# Patient Record
Sex: FEMALE | Race: BLACK OR AFRICAN AMERICAN | Employment: OTHER | URBAN - METROPOLITAN AREA
[De-identification: names, ages, dates, MRNs, and addresses within clinical notes are randomized per-mention and may not be internally consistent; named-entity substitution may affect disease eponyms.]

---

## 2017-01-06 ENCOUNTER — HOME HEALTH ADMISSION (OUTPATIENT)
Dept: HOME HEALTH SERVICES | Facility: HOME HEALTH | Age: 67
End: 2017-01-06

## 2017-10-16 ENCOUNTER — HOSPITAL ENCOUNTER (OUTPATIENT)
Dept: CT IMAGING | Age: 67
Discharge: HOME | End: 2017-10-16
Attending: INTERNAL MEDICINE
Payer: MEDICARE

## 2017-10-16 VITALS — WEIGHT: 118 LBS | HEIGHT: 67 IN | BODY MASS INDEX: 18.52 KG/M2

## 2017-10-16 DIAGNOSIS — K57.20 COLONIC DIVERTICULAR ABSCESS: ICD-10-CM

## 2017-10-16 LAB — CREAT BLD-MCNC: 0.9 MG/DL (ref 0.8–1.5)

## 2017-10-16 PROCEDURE — 82565 ASSAY OF CREATININE: CPT

## 2017-10-16 PROCEDURE — 74011000258 HC RX REV CODE- 258: Performed by: INTERNAL MEDICINE

## 2017-10-16 PROCEDURE — 74011636320 HC RX REV CODE- 636/320: Performed by: INTERNAL MEDICINE

## 2017-10-16 PROCEDURE — 74177 CT ABD & PELVIS W/CONTRAST: CPT

## 2017-10-16 RX ORDER — SODIUM CHLORIDE 0.9 % (FLUSH) 0.9 %
10 SYRINGE (ML) INJECTION
Status: COMPLETED | OUTPATIENT
Start: 2017-10-16 | End: 2017-10-16

## 2017-10-16 RX ADMIN — SODIUM CHLORIDE 100 ML: 900 INJECTION, SOLUTION INTRAVENOUS at 14:31

## 2017-10-16 RX ADMIN — DIATRIZOATE MEGLUMINE AND DIATRIZOATE SODIUM 15 ML: 660; 100 LIQUID ORAL; RECTAL at 14:31

## 2017-10-16 RX ADMIN — IOPAMIDOL 100 ML: 755 INJECTION, SOLUTION INTRAVENOUS at 14:31

## 2017-10-16 RX ADMIN — Medication 10 ML: at 14:31

## 2017-10-27 ENCOUNTER — APPOINTMENT (OUTPATIENT)
Dept: CT IMAGING | Age: 67
End: 2017-10-27
Attending: EMERGENCY MEDICINE
Payer: MEDICARE

## 2017-10-27 ENCOUNTER — HOSPITAL ENCOUNTER (EMERGENCY)
Age: 67
Discharge: HOME | End: 2017-10-27
Attending: EMERGENCY MEDICINE
Payer: MEDICARE

## 2017-10-27 VITALS
RESPIRATION RATE: 99 BRPM | TEMPERATURE: 98.3 F | BODY MASS INDEX: 18.36 KG/M2 | HEIGHT: 67 IN | WEIGHT: 117 LBS | HEART RATE: 56 BPM | DIASTOLIC BLOOD PRESSURE: 66 MMHG | OXYGEN SATURATION: 99 % | SYSTOLIC BLOOD PRESSURE: 148 MMHG

## 2017-10-27 DIAGNOSIS — K57.20 COLONIC DIVERTICULAR ABSCESS: Primary | ICD-10-CM

## 2017-10-27 LAB
ALBUMIN SERPL-MCNC: 3 G/DL (ref 3.2–4.6)
ALBUMIN/GLOB SERPL: 0.8 {RATIO} (ref 1.2–3.5)
ALP SERPL-CCNC: 380 U/L (ref 50–136)
ALT SERPL-CCNC: 23 U/L (ref 12–65)
ANION GAP SERPL CALC-SCNC: 9 MMOL/L (ref 7–16)
AST SERPL-CCNC: 45 U/L (ref 15–37)
BASOPHILS # BLD: 0 K/UL (ref 0–0.2)
BASOPHILS NFR BLD: 0 % (ref 0–2)
BILIRUB SERPL-MCNC: 0.4 MG/DL (ref 0.2–1.1)
BUN SERPL-MCNC: 18 MG/DL (ref 8–23)
CALCIUM SERPL-MCNC: 8.4 MG/DL (ref 8.3–10.4)
CHLORIDE SERPL-SCNC: 110 MMOL/L (ref 98–107)
CO2 SERPL-SCNC: 27 MMOL/L (ref 21–32)
CREAT SERPL-MCNC: 0.85 MG/DL (ref 0.6–1)
DIFFERENTIAL METHOD BLD: ABNORMAL
EOSINOPHIL # BLD: 0 K/UL (ref 0–0.8)
EOSINOPHIL NFR BLD: 0 % (ref 0.5–7.8)
ERYTHROCYTE [DISTWIDTH] IN BLOOD BY AUTOMATED COUNT: 17.7 % (ref 11.9–14.6)
GLOBULIN SER CALC-MCNC: 3.7 G/DL (ref 2.3–3.5)
GLUCOSE SERPL-MCNC: 107 MG/DL (ref 65–100)
HCT VFR BLD AUTO: 36.6 % (ref 35.8–46.3)
HGB BLD-MCNC: 11.5 G/DL (ref 11.7–15.4)
IMM GRANULOCYTES # BLD: 0 K/UL (ref 0–0.5)
IMM GRANULOCYTES NFR BLD: 0 % (ref 0–5)
LIPASE SERPL-CCNC: 222 U/L (ref 73–393)
LYMPHOCYTES # BLD: 1.1 K/UL (ref 0.5–4.6)
LYMPHOCYTES NFR BLD: 13 % (ref 13–44)
MCH RBC QN AUTO: 25.7 PG (ref 26.1–32.9)
MCHC RBC AUTO-ENTMCNC: 31.4 G/DL (ref 31.4–35)
MCV RBC AUTO: 81.7 FL (ref 79.6–97.8)
MONOCYTES # BLD: 0.4 K/UL (ref 0.1–1.3)
MONOCYTES NFR BLD: 5 % (ref 4–12)
NEUTS SEG # BLD: 6.9 K/UL (ref 1.7–8.2)
NEUTS SEG NFR BLD: 82 % (ref 43–78)
PLATELET # BLD AUTO: 340 K/UL (ref 150–450)
PMV BLD AUTO: 11.1 FL (ref 10.8–14.1)
POTASSIUM SERPL-SCNC: 3.8 MMOL/L (ref 3.5–5.1)
PROT SERPL-MCNC: 6.7 G/DL (ref 6.3–8.2)
RBC # BLD AUTO: 4.48 M/UL (ref 4.05–5.25)
SODIUM SERPL-SCNC: 146 MMOL/L (ref 136–145)
WBC # BLD AUTO: 8.5 K/UL (ref 4.3–11.1)

## 2017-10-27 PROCEDURE — 74011636320 HC RX REV CODE- 636/320: Performed by: EMERGENCY MEDICINE

## 2017-10-27 PROCEDURE — 74011000258 HC RX REV CODE- 258: Performed by: EMERGENCY MEDICINE

## 2017-10-27 PROCEDURE — 85025 COMPLETE CBC W/AUTO DIFF WBC: CPT | Performed by: EMERGENCY MEDICINE

## 2017-10-27 PROCEDURE — 74011250636 HC RX REV CODE- 250/636: Performed by: EMERGENCY MEDICINE

## 2017-10-27 PROCEDURE — 96361 HYDRATE IV INFUSION ADD-ON: CPT | Performed by: EMERGENCY MEDICINE

## 2017-10-27 PROCEDURE — 99284 EMERGENCY DEPT VISIT MOD MDM: CPT | Performed by: EMERGENCY MEDICINE

## 2017-10-27 PROCEDURE — 83690 ASSAY OF LIPASE: CPT | Performed by: EMERGENCY MEDICINE

## 2017-10-27 PROCEDURE — 96374 THER/PROPH/DIAG INJ IV PUSH: CPT | Performed by: EMERGENCY MEDICINE

## 2017-10-27 PROCEDURE — 74177 CT ABD & PELVIS W/CONTRAST: CPT

## 2017-10-27 PROCEDURE — 96375 TX/PRO/DX INJ NEW DRUG ADDON: CPT | Performed by: EMERGENCY MEDICINE

## 2017-10-27 PROCEDURE — 80053 COMPREHEN METABOLIC PANEL: CPT | Performed by: EMERGENCY MEDICINE

## 2017-10-27 RX ORDER — SODIUM CHLORIDE 9 MG/ML
125 INJECTION, SOLUTION INTRAVENOUS CONTINUOUS
Status: DISCONTINUED | OUTPATIENT
Start: 2017-10-27 | End: 2017-10-27 | Stop reason: HOSPADM

## 2017-10-27 RX ORDER — ONDANSETRON 2 MG/ML
4 INJECTION INTRAMUSCULAR; INTRAVENOUS
Status: COMPLETED | OUTPATIENT
Start: 2017-10-27 | End: 2017-10-27

## 2017-10-27 RX ORDER — MOXIFLOXACIN HYDROCHLORIDE 400 MG/1
400 TABLET ORAL DAILY
Qty: 10 TAB | Refills: 0 | Status: SHIPPED | OUTPATIENT
Start: 2017-10-27 | End: 2017-11-06

## 2017-10-27 RX ORDER — MORPHINE SULFATE 2 MG/ML
2 INJECTION, SOLUTION INTRAMUSCULAR; INTRAVENOUS ONCE
Status: COMPLETED | OUTPATIENT
Start: 2017-10-27 | End: 2017-10-27

## 2017-10-27 RX ORDER — SODIUM CHLORIDE 0.9 % (FLUSH) 0.9 %
10 SYRINGE (ML) INJECTION
Status: COMPLETED | OUTPATIENT
Start: 2017-10-27 | End: 2017-10-27

## 2017-10-27 RX ORDER — ONDANSETRON 4 MG/1
4 TABLET, FILM COATED ORAL
Qty: 9 TAB | Refills: 0 | Status: SHIPPED | OUTPATIENT
Start: 2017-10-27 | End: 2019-04-12

## 2017-10-27 RX ADMIN — IOPAMIDOL 100 ML: 755 INJECTION, SOLUTION INTRAVENOUS at 11:38

## 2017-10-27 RX ADMIN — MORPHINE SULFATE 2 MG: 2 INJECTION, SOLUTION INTRAMUSCULAR; INTRAVENOUS at 10:20

## 2017-10-27 RX ADMIN — ONDANSETRON 4 MG: 2 INJECTION INTRAMUSCULAR; INTRAVENOUS at 10:20

## 2017-10-27 RX ADMIN — Medication 10 ML: at 11:38

## 2017-10-27 RX ADMIN — SODIUM CHLORIDE 100 ML: 900 INJECTION, SOLUTION INTRAVENOUS at 11:38

## 2017-10-27 RX ADMIN — DIATRIZOATE MEGLUMINE AND DIATRIZOATE SODIUM 15 ML: 600; 100 SOLUTION ORAL; RECTAL at 10:19

## 2017-10-27 RX ADMIN — SODIUM CHLORIDE 1000 ML: 900 INJECTION, SOLUTION INTRAVENOUS at 10:20

## 2017-10-27 NOTE — ED NOTES
I have reviewed discharge instructions with the patient. The patient verbalized understanding. Patient left ED via Discharge Method: wheelchair to Home with self    Opportunity for questions and clarification provided. Patient given 2 scripts.

## 2017-10-27 NOTE — ED PROVIDER NOTES
HPI Comments: 51-year-old lady with a history of a diverticular abscess that has been intermittently treated with some antibiotics she presents with concerns about worsening pain. She is worried that the abscess has increased in size. She denies any fevers with that she has had some nausea and mild vomiting. Patient says she's had no blood in her bowels. Overall she rates her pain as a 10 out of 10. Elements of this note were created using speech recognition software. As such, errors of speech recognition may be present. Patient is a 79 y.o. female presenting with abdominal pain. The history is provided by the patient. Abdominal Pain    Associated symptoms include nausea. Pertinent negatives include no fever, no diarrhea, no vomiting, no dysuria, no hematuria, no headaches, no arthralgias, no myalgias and no chest pain.         Past Medical History:   Diagnosis Date    Abnormal gait 7/5/2016    Acute serous otitis media of left ear     Anxiety     Bilateral carpal tunnel syndrome 7/5/2016    Chronic abdominal pain 7/5/2016    Dementia due to Parkinson's disease without behavioral disturbance ([address] 7/5/2016    Depression     Diverticulitis     GERD (gastroesophageal reflux disease)     Heart murmur     Hypercholesterolemia     Hypertension     daily meds, on norvasc prn- took one yesterday for BP of 152/68    Neuropathic pain 7/5/2016    Nontoxic multinodular goiter 7/5/2016    Orthostatic hypotension     Parkinson's disease ([address]     dx 2014- sometimes requires walker or wheelchair when tired    Peripheral neuropathy     Syncope 10/10/2015    Type 2 diabetes mellitus without complication 4/7/6577    \"They thought I was a diabetic\" - no meds- started checking glucose 2 yrs ago; last A1C=? ; has not checked since 5 mos ago (June 2016)    Urinary frequency 7/5/2016    Vaginal bleeding     Vomiting     Weight loss     Weight loss, unintentional     50 lbs over 2 yrs       Past Surgical History:   Procedure Laterality Date    HX APPENDECTOMY  1974    HX CHOLECYSTECTOMY  2001    HX TUBAL LIGATION  1974         Family History:   Problem Relation Age of Onset    Diabetes Mother     Alzheimer Mother     Diabetes Father     Cancer Father      prostate    Stroke Father     Diabetes Sister     Diabetes Brother     Cancer Brother      kidney and prostate    Sickle Cell Anemia Son      2 sons     Breast Cancer Neg Hx        Social History     Social History    Marital status:      Spouse name: N/A    Number of children: N/A    Years of education: N/A     Occupational History    Not on file. Social History Main Topics    Smoking status: Never Smoker    Smokeless tobacco: Never Used    Alcohol use No    Drug use: No    Sexual activity: Not on file     Other Topics Concern    Not on file     Social History Narrative         ALLERGIES: Flagyl [metronidazole]; Gabapentin; Milk containing products; Omnipaque rediflo [iohexol]; and Red dye    Review of Systems   Constitutional: Negative for chills, diaphoresis and fever. HENT: Negative for congestion, rhinorrhea and sore throat. Eyes: Negative for redness and visual disturbance. Respiratory: Negative for cough, chest tightness, shortness of breath and wheezing. Cardiovascular: Negative for chest pain and palpitations. Gastrointestinal: Positive for abdominal pain and nausea. Negative for blood in stool, diarrhea and vomiting. Endocrine: Negative for polydipsia and polyuria. Genitourinary: Negative for dysuria and hematuria. Musculoskeletal: Negative for arthralgias, myalgias and neck stiffness. Skin: Negative for rash. Allergic/Immunologic: Negative for environmental allergies and food allergies. Neurological: Negative for dizziness, weakness and headaches. Hematological: Negative for adenopathy. Does not bruise/bleed easily. Psychiatric/Behavioral: Negative for confusion and sleep disturbance. The patient is not nervous/anxious. Vitals:    10/27/17 0949   BP: 170/81   Pulse: (!) 59   Resp: (!) 99   Temp: 98.3 °F (36.8 °C)   SpO2: 99%   Weight: 53.1 kg (117 lb)   Height: 5' 7\" (1.702 m)            Physical Exam   Constitutional: She is oriented to person, place, and time. She appears well-developed and well-nourished. HENT:   Head: Normocephalic and atraumatic. Eyes: Conjunctivae and EOM are normal. Pupils are equal, round, and reactive to light. Neck: Normal range of motion. Cardiovascular: Normal rate and regular rhythm. Pulmonary/Chest: Effort normal and breath sounds normal. No respiratory distress. She has no wheezes. She has no rales. She exhibits no tenderness. Abdominal: Soft. Bowel sounds are normal. There is tenderness. There is no rebound and no guarding. Mild diffuse abdominal pain with no rebound or guarding   Musculoskeletal: Normal range of motion. She exhibits no edema or tenderness. Lymphadenopathy:     She has no cervical adenopathy. Neurological: She is alert and oriented to person, place, and time. Skin: Skin is warm and dry. Psychiatric: She has a normal mood and affect. Nursing note and vitals reviewed. MDM  Number of Diagnoses or Management Options  Diagnosis management comments: Patient says that she is not allergic to IV or oral contrast material and in fact had a CT scan 10 days ago with both and had no problems. I will get a CT scan of her abdomen with oral and IV contrast to see if the abscess has increased in size. I discussed the case with on call for gastroenterology who advised another round of Augmentin. However, the patient says that the Augmentin turned her red and made her itch I will find a different antibiotic. GI said they would see her in the office on Monday.     ED Course       Procedures

## 2017-10-27 NOTE — DISCHARGE INSTRUCTIONS
Return with any fevers, vomiting, difficulty breathing, worsening symptoms, or additional concerns. You should get a call from the gastroenterology office for an appointment on Monday.

## 2017-10-27 NOTE — ED NOTES
This RN was the secondary RN for this patient. The primary RN was responsible for primary care of this patient.

## 2017-10-27 NOTE — ED TRIAGE NOTES
Patient reports abdominal pain x 4 days. History of diverticulitis earlier this month.  Brought in by EMS

## 2017-11-17 PROBLEM — K59.00 CONSTIPATION: Status: ACTIVE | Noted: 2017-11-17

## 2017-11-17 PROBLEM — K57.80 DIVERTICULAR DISEASE OF INTESTINE WITH PERFORATION AND ABSCESS: Status: ACTIVE | Noted: 2017-11-17

## 2017-12-18 ENCOUNTER — APPOINTMENT (OUTPATIENT)
Dept: CT IMAGING | Age: 67
End: 2017-12-18
Attending: EMERGENCY MEDICINE
Payer: MEDICARE

## 2017-12-18 ENCOUNTER — HOSPITAL ENCOUNTER (EMERGENCY)
Age: 67
Discharge: HOME | End: 2017-12-18
Attending: EMERGENCY MEDICINE
Payer: MEDICARE

## 2017-12-18 VITALS
DIASTOLIC BLOOD PRESSURE: 64 MMHG | HEART RATE: 79 BPM | TEMPERATURE: 98 F | BODY MASS INDEX: 17.42 KG/M2 | WEIGHT: 111 LBS | RESPIRATION RATE: 18 BRPM | SYSTOLIC BLOOD PRESSURE: 150 MMHG | HEIGHT: 67 IN | OXYGEN SATURATION: 98 %

## 2017-12-18 DIAGNOSIS — R10.84 ABDOMINAL PAIN, GENERALIZED: Primary | ICD-10-CM

## 2017-12-18 DIAGNOSIS — R63.4 WEIGHT LOSS: ICD-10-CM

## 2017-12-18 LAB
ALBUMIN SERPL-MCNC: 3.9 G/DL (ref 3.2–4.6)
ALBUMIN/GLOB SERPL: 1.1 {RATIO} (ref 1.2–3.5)
ALP SERPL-CCNC: 97 U/L (ref 50–136)
ALT SERPL-CCNC: 6 U/L (ref 12–65)
ANION GAP SERPL CALC-SCNC: 8 MMOL/L (ref 7–16)
AST SERPL-CCNC: 13 U/L (ref 15–37)
BASOPHILS # BLD: 0 K/UL (ref 0–0.2)
BASOPHILS NFR BLD: 0 % (ref 0–2)
BILIRUB SERPL-MCNC: 0.8 MG/DL (ref 0.2–1.1)
BUN SERPL-MCNC: 11 MG/DL (ref 8–23)
CALCIUM SERPL-MCNC: 8.8 MG/DL (ref 8.3–10.4)
CHLORIDE SERPL-SCNC: 105 MMOL/L (ref 98–107)
CO2 SERPL-SCNC: 26 MMOL/L (ref 21–32)
CREAT SERPL-MCNC: 1.03 MG/DL (ref 0.6–1)
DIFFERENTIAL METHOD BLD: ABNORMAL
EOSINOPHIL # BLD: 0 K/UL (ref 0–0.8)
EOSINOPHIL NFR BLD: 0 % (ref 0.5–7.8)
ERYTHROCYTE [DISTWIDTH] IN BLOOD BY AUTOMATED COUNT: 15.8 % (ref 11.9–14.6)
GLOBULIN SER CALC-MCNC: 3.6 G/DL (ref 2.3–3.5)
GLUCOSE SERPL-MCNC: 95 MG/DL (ref 65–100)
HCT VFR BLD AUTO: 36.2 % (ref 35.8–46.3)
HGB BLD-MCNC: 12.1 G/DL (ref 11.7–15.4)
IMM GRANULOCYTES # BLD: 0 K/UL (ref 0–0.5)
IMM GRANULOCYTES NFR BLD AUTO: 0 % (ref 0–5)
LYMPHOCYTES # BLD: 1.2 K/UL (ref 0.5–4.6)
LYMPHOCYTES NFR BLD: 15 % (ref 13–44)
MCH RBC QN AUTO: 26.9 PG (ref 26.1–32.9)
MCHC RBC AUTO-ENTMCNC: 33.4 G/DL (ref 31.4–35)
MCV RBC AUTO: 80.4 FL (ref 79.6–97.8)
MONOCYTES # BLD: 0.6 K/UL (ref 0.1–1.3)
MONOCYTES NFR BLD: 7 % (ref 4–12)
NEUTS SEG # BLD: 6.5 K/UL (ref 1.7–8.2)
NEUTS SEG NFR BLD: 78 % (ref 43–78)
PLATELET # BLD AUTO: 287 K/UL (ref 150–450)
PMV BLD AUTO: 10.8 FL (ref 10.8–14.1)
POTASSIUM SERPL-SCNC: 3.8 MMOL/L (ref 3.5–5.1)
PROT SERPL-MCNC: 7.5 G/DL (ref 6.3–8.2)
RBC # BLD AUTO: 4.5 M/UL (ref 4.05–5.25)
SODIUM SERPL-SCNC: 139 MMOL/L (ref 136–145)
WBC # BLD AUTO: 8.4 K/UL (ref 4.3–11.1)

## 2017-12-18 PROCEDURE — 81003 URINALYSIS AUTO W/O SCOPE: CPT | Performed by: EMERGENCY MEDICINE

## 2017-12-18 PROCEDURE — 74011000258 HC RX REV CODE- 258: Performed by: EMERGENCY MEDICINE

## 2017-12-18 PROCEDURE — 99284 EMERGENCY DEPT VISIT MOD MDM: CPT | Performed by: EMERGENCY MEDICINE

## 2017-12-18 PROCEDURE — 80053 COMPREHEN METABOLIC PANEL: CPT | Performed by: EMERGENCY MEDICINE

## 2017-12-18 PROCEDURE — 74011636320 HC RX REV CODE- 636/320: Performed by: EMERGENCY MEDICINE

## 2017-12-18 PROCEDURE — 85025 COMPLETE CBC W/AUTO DIFF WBC: CPT | Performed by: EMERGENCY MEDICINE

## 2017-12-18 PROCEDURE — 74011250636 HC RX REV CODE- 250/636: Performed by: EMERGENCY MEDICINE

## 2017-12-18 PROCEDURE — 74177 CT ABD & PELVIS W/CONTRAST: CPT

## 2017-12-18 RX ORDER — SODIUM CHLORIDE 0.9 % (FLUSH) 0.9 %
10 SYRINGE (ML) INJECTION
Status: COMPLETED | OUTPATIENT
Start: 2017-12-18 | End: 2017-12-18

## 2017-12-18 RX ORDER — SODIUM CHLORIDE 9 MG/ML
500 INJECTION, SOLUTION INTRAVENOUS ONCE
Status: COMPLETED | OUTPATIENT
Start: 2017-12-18 | End: 2017-12-18

## 2017-12-18 RX ADMIN — SODIUM CHLORIDE 100 ML: 900 INJECTION, SOLUTION INTRAVENOUS at 15:45

## 2017-12-18 RX ADMIN — Medication 10 ML: at 15:45

## 2017-12-18 RX ADMIN — SODIUM CHLORIDE 500 ML: 900 INJECTION, SOLUTION INTRAVENOUS at 13:52

## 2017-12-18 RX ADMIN — IOPAMIDOL 100 ML: 755 INJECTION, SOLUTION INTRAVENOUS at 15:45

## 2017-12-18 NOTE — DISCHARGE INSTRUCTIONS
Abdominal Pain: Care Instructions  Your Care Instructions    Abdominal pain has many possible causes. Some aren't serious and get better on their own in a few days. Others need more testing and treatment. If your pain continues or gets worse, you need to be rechecked and may need more tests to find out what is wrong. You may need surgery to correct the problem. Don't ignore new symptoms, such as fever, nausea and vomiting, urination problems, pain that gets worse, and dizziness. These may be signs of a more serious problem. Your doctor may have recommended a follow-up visit in the next 8 to 12 hours. If you are not getting better, you may need more tests or treatment. The doctor has checked you carefully, but problems can develop later. If you notice any problems or new symptoms, get medical treatment right away. Follow-up care is a key part of your treatment and safety. Be sure to make and go to all appointments, and call your doctor if you are having problems. It's also a good idea to know your test results and keep a list of the medicines you take. How can you care for yourself at home? · Rest until you feel better. · To prevent dehydration, drink plenty of fluids, enough so that your urine is light yellow or clear like water. Choose water and other caffeine-free clear liquids until you feel better. If you have kidney, heart, or liver disease and have to limit fluids, talk with your doctor before you increase the amount of fluids you drink. · If your stomach is upset, eat mild foods, such as rice, dry toast or crackers, bananas, and applesauce. Try eating several small meals instead of two or three large ones. · Wait until 48 hours after all symptoms have gone away before you have spicy foods, alcohol, and drinks that contain caffeine. · Do not eat foods that are high in fat. · Avoid anti-inflammatory medicines such as aspirin, ibuprofen (Advil, Motrin), and naproxen (Aleve).  These can cause stomach upset. Talk to your doctor if you take daily aspirin for another health problem. When should you call for help? Call 911 anytime you think you may need emergency care. For example, call if:  ? · You passed out (lost consciousness). ? · You pass maroon or very bloody stools. ? · You vomit blood or what looks like coffee grounds. ? · You have new, severe belly pain. ?Call your doctor now or seek immediate medical care if:  ? · Your pain gets worse, especially if it becomes focused in one area of your belly. ? · You have a new or higher fever. ? · Your stools are black and look like tar, or they have streaks of blood. ? · You have unexpected vaginal bleeding. ? · You have symptoms of a urinary tract infection. These may include:  ¨ Pain when you urinate. ¨ Urinating more often than usual.  ¨ Blood in your urine. ? · You are dizzy or lightheaded, or you feel like you may faint. ? Watch closely for changes in your health, and be sure to contact your doctor if:  ? · You are not getting better after 1 day (24 hours). Where can you learn more? Go to http://[employer].brit.info/. Enter G792 in the search box to learn more about \"Abdominal Pain: Care Instructions. \"  Current as of: March 20, 2017  Content Version: 11.4  © 6732-4934 [employer] Care instructions adapted under license by [employer] (which disclaims liability or warranty for this information). If you have questions about a medical condition or this instruction, always ask your healthcare professional. [place] [address] any warranty or liability for your use of this information.   Recent Results (from the past 8 hour(s))   CBC WITH AUTOMATED DIFF    Collection Time: 12/18/17 11:45 AM   Result Value Ref Range    WBC 8.4 4.3 - 11.1 K/uL    RBC 4.50 4.05 - 5.25 M/uL    HGB 12.1 11.7 - 15.4 g/dL    HCT 36.2 35.8 - 46.3 %    MCV 80.4 79.6 - 97.8 FL    MCH 26.9 26.1 - 32.9 PG MCHC 33.4 31.4 - 35.0 g/dL    RDW 15.8 (H) 11.9 - 14.6 %    PLATELET 797 083 - 442 K/uL    MPV 10.8 10.8 - 14.1 FL    DF AUTOMATED      NEUTROPHILS 78 43 - 78 %    LYMPHOCYTES 15 13 - 44 %    MONOCYTES 7 4.0 - 12.0 %    EOSINOPHILS 0 (L) 0.5 - 7.8 %    BASOPHILS 0 0.0 - 2.0 %    IMMATURE GRANULOCYTES 0 0.0 - 5.0 %    ABS. NEUTROPHILS 6.5 1.7 - 8.2 K/UL    ABS. LYMPHOCYTES 1.2 0.5 - 4.6 K/UL    ABS. MONOCYTES 0.6 0.1 - 1.3 K/UL    ABS. EOSINOPHILS 0.0 0.0 - 0.8 K/UL    ABS. BASOPHILS 0.0 0.0 - 0.2 K/UL    ABS. IMM. GRANS. 0.0 0.0 - 0.5 K/UL   METABOLIC PANEL, COMPREHENSIVE    Collection Time: 12/18/17 11:45 AM   Result Value Ref Range    Sodium 139 136 - 145 mmol/L    Potassium 3.8 3.5 - 5.1 mmol/L    Chloride 105 98 - 107 mmol/L    CO2 26 21 - 32 mmol/L    Anion gap 8 7 - 16 mmol/L    Glucose 95 65 - 100 mg/dL    BUN 11 8 - 23 MG/DL    Creatinine 1.03 (H) 0.6 - 1.0 MG/DL    GFR est AA >60 >60 ml/min/1.73m2    GFR est non-AA 57 (L) >60 ml/min/1.73m2    Calcium 8.8 8.3 - 10.4 MG/DL    Bilirubin, total 0.8 0.2 - 1.1 MG/DL    ALT (SGPT) 6 (L) 12 - 65 U/L    AST (SGOT) 13 (L) 15 - 37 U/L    Alk. phosphatase 97 50 - 136 U/L    Protein, total 7.5 6.3 - 8.2 g/dL    Albumin 3.9 3.2 - 4.6 g/dL    Globulin 3.6 (H) 2.3 - 3.5 g/dL    A-G Ratio 1.1 (L) 1.2 - 3.5       Ct Abd Pelv W Cont    Result Date: 12/18/2017  CT ABDOMEN AND PELVIS WITH CONTRAST HISTORY: abd pain, hx of diverticular dz. wt loss, nausea/vomiting, 79 years Female ?c/o abdominal pain- states she was recently dx with diverticulitis. Pt states nausea. Pt also c/o blood in urine and generalized body pain BMI 17.4 COMPARISON: CT abdomen October 27, 2017 TECHNIQUE: Oral contrast was not administered. 100 cc of nonionic intravenous contrast was injected, and axial helical CT images were obtained from above the diaphragm through the pelvis. Coronal reformatted images were obtained at the scanner console and made available for review.   Radiation dose reduction techniques were used for this study:  Our CT scanners use one or all of the following: Automated exposure control, adjustment of the mA and/or kVp according to patient's size, iterative reconstruction. FINDINGS: ABDOMEN: Minimal dependent subsegmental atelectasis bilateral lung bases. Evidence of cholecystectomy. Persistent diffuse hepatic steatosis. Normal-appearing spleen, pancreas, bilateral adrenal glands. Small simple appearing right renal interpolar region cortical cyst unchanged. Subcentimeter left renal cortical hypoenhancing lesions also unchanged, too small to characterize. Left renal upper pole cortical scarring unchanged. Mild calcific atherosclerosis of a normal caliber abdominal aorta. No evidence of significant lymphadenopathy. Normal-appearing small bowel. No evidence of intraperitoneal free air or free fluid. PELVIS: Normal-appearing urinary bladder. Ovoid mass in the anterior uterine myometrium measuring up to 2.5 cm appears unchanged, may represent a fibroid. Normal-appearing bilateral adnexa. Large stool ball in the rectum. Stool is seen throughout the colon. There is no definite evidence of residual diverticular abscess. Persistent sigmoid diverticulosis. No evidence of pelvic free fluid. No evidence of significant inguinal or pelvic sidewall lymphadenopathy. Visualized osseous structures unremarkable. IMPRESSION: 1. No acute pathology identified. No evidence of residual diverticular abscess. 2.  Other chronic findings as above.

## 2017-12-18 NOTE — PROGRESS NOTES
Visit with patient in ER. Char.     Tata Lombardo, staff   C: [phone] /  [email or web address]

## 2017-12-18 NOTE — ED TRIAGE NOTES
Pt c/o abdominal pain- states she was recently dx with diverticulitis. Pt states nausea. Pt also c/o blood in urine and generalized body pain d/t parkinson's. Pt is alert and oriented x 4. Respirations are even and unlabored. Pt appears in no acute distress at this time.

## 2017-12-18 NOTE — ED PROVIDER NOTES
HPI Comments: 44-year-old female presents to the emergency department with lower abdominal pain. Patient states that she was diagnosed with diverticulitis. Being followed by GI. Over the last several days and increased pain. Nausea. No alleviating. They progressively worse    No fever. Some chills. Some nausea.        Past Medical History:   Diagnosis Date    Abnormal gait 7/5/2016    Acute serous otitis media of left ear     Anxiety     Bilateral carpal tunnel syndrome 7/5/2016    Chronic abdominal pain 7/5/2016    Dementia due to Parkinson's disease without behavioral disturbance [address] [address] 7/5/2016    Depression     Diverticulitis     GERD (gastroesophageal reflux disease)     Heart murmur     Hypercholesterolemia     Hypertension     daily meds, on norvasc prn- took one yesterday for BP of 152/68    Neuropathic pain 7/5/2016    Nontoxic multinodular goiter 7/5/2016    Orthostatic hypotension     Parkinson's disease ([facility]     dx 2014- sometimes requires walker or wheelchair when tired    Peripheral neuropathy     Syncope 10/10/2015    Type 2 diabetes mellitus without complication 7/7/6003    \"They thought I was a diabetic\" - no meds- started checking glucose 2 yrs ago; last A1C=? ; has not checked since 5 mos ago (June 2016)    Urinary frequency 7/5/2016    Vaginal bleeding     Vomiting     Weight loss     Weight loss, unintentional     50 lbs over 2 yrs       Past Surgical History:   Procedure Laterality Date    HX APPENDECTOMY  1974    HX CHOLECYSTECTOMY  2001    HX TUBAL LIGATION  1974         Family History:   Problem Relation Age of Onset    Diabetes Mother     Alzheimer Mother     Diabetes Father     Cancer Father      prostate    Stroke Father     Diabetes Sister     Diabetes Brother     Cancer Brother      kidney and prostate    Sickle Cell Anemia Son      2 sons     Breast Cancer Neg Hx        Social History     Social History    Marital status:      Spouse name: N/A    Number of children: N/A    Years of education: N/A     Occupational History    Not on file. Social History Main Topics    Smoking status: Never Smoker    Smokeless tobacco: Never Used    Alcohol use No    Drug use: No    Sexual activity: Not on file     Other Topics Concern    Not on file     Social History Narrative         ALLERGIES: Flagyl [metronidazole]; Gabapentin; Milk containing products; Omnipaque rediflo [iohexol]; and Red dye    Review of Systems   Constitutional: Negative for chills and fever. HENT: Negative. Eyes: Negative. Respiratory: Negative for shortness of breath. Cardiovascular: Negative for chest pain and palpitations. Gastrointestinal: Positive for abdominal pain and nausea. Negative for abdominal distention. Genitourinary: Negative. Musculoskeletal: Negative. Skin: Negative. Neurological: Negative. Psychiatric/Behavioral: Negative. Vitals:    12/18/17 1139 12/18/17 1328   BP: 166/74 136/60   Pulse: 70    Resp: 19    Temp: 97.6 °F (36.4 °C)    SpO2: 98% 99%   Weight: 50.3 kg (111 lb)    Height: 5' 7\" (1.702 m)             Physical Exam   Constitutional: She is oriented to person, place, and time. She appears well-developed and well-nourished. HENT:   Head: Normocephalic and atraumatic. Eyes: Pupils are equal, round, and reactive to light. Cardiovascular: Normal rate and regular rhythm. Pulmonary/Chest: Breath sounds normal. No respiratory distress. She has no wheezes. Abdominal: Soft. She exhibits no distension. There is tenderness. There is guarding. There is no rebound. Musculoskeletal: Normal range of motion. Neurological: She is alert and oriented to person, place, and time. Skin: Skin is warm and dry. Psychiatric: She has a normal mood and affect. Her behavior is normal.   Nursing note and vitals reviewed.        MDM  Number of Diagnoses or Management Options  Diagnosis management comments: 69-year-old female  Abdominal pain. He followed by GI for diverticulitis. Reports weight loss. Feeling poorly. We'll check labs. Obtain CT of abdomen and pelvis. Hydrate. Reassess       Amount and/or Complexity of Data Reviewed  Clinical lab tests: reviewed    atient appears well.   No vomiting    CT and labs reviewed    We'll discharge home to follow up with her primary care physician and GI.  ED Course       Procedures

## 2017-12-18 NOTE — ED NOTES
I have reviewed discharge instructions with the patient. The patient verbalized understanding. Patient left ED via Discharge Method: ambulatory to Home with family. Opportunity for questions and clarification provided. Patient given 0 scripts. To continue your aftercare when you leave the hospital, you may receive an automated call from our care team to check in on how you are doing. This is a free service and part of our promise to provide the best care and service to meet your aftercare needs.  If you have questions, or wish to unsubscribe from this service please call [phone]. Thank you for Choosing our [facility] Emergency Department.

## 2018-01-22 PROBLEM — E11.21 TYPE 2 DIABETES MELLITUS WITH NEPHROPATHY (HCC): Status: ACTIVE | Noted: 2018-01-22

## 2018-01-26 ENCOUNTER — PATIENT OUTREACH (OUTPATIENT)
Dept: CASE MANAGEMENT | Age: 68
End: 2018-01-26

## 2018-01-26 NOTE — PROGRESS NOTES
CM reviewed record. CM attempted to reach patient to discuss concerns and issues with transportation, however no answer    CM will attempt at later date    CM noted the patient has Medicaid - patient may need to be made aware of the Medicaid transportation benefit    CM noted CM SW of this information     This note will not be viewable in [employer].

## 2018-01-29 ENCOUNTER — PATIENT OUTREACH (OUTPATIENT)
Dept: CASE MANAGEMENT | Age: 68
End: 2018-01-29

## 2018-01-29 NOTE — PROGRESS NOTES
KENJI CORREIA contacted patient and was able to reach her - patient stated that she is in process of changing all of her MDs (including PCP) to Crys Mitchell - at current time she has some of her MDs with [facility] and others with [facility] - she will be meeting with her new PCP at [facility] on 1/31 (through [facility]) - patient appeared confused at times during conversation stating that she has a CLTC aide who accompanies her on her appts \"but cannot always be there\" - and she needs to either be taken to appts or have someone accompany her due to her fainting  spells - patient is currently being seen/evaluated by a neurologist for possible Parkinsons disease - this SW also contacted patient's CLTC CM, [employer] (T# [phone]), left message and received a call back from late today - CM stated that patient receives 15 basic care  hours each week and has up to an additional 10 hours available to her for appts - concern on part of [facility] aide is that her car could not handle driving to all of these different appts and her agency was not reimbursing her for mileage/gas - patient does have both [employer] Medicaid and CLTC CM has told her that the aide can accompnay her on the Medicaid van and the hours will be comunicated to the [employer] aide's agency ([employer]) so aide can be avaialble - [facility] CM stated that patient has quite a few MD appts and that this was also presenting its challenges - hopefully, with patient's MDs being centered within one system, appts can be coordinated more completely so that the [employer] aide can accompany patient as she is needed.     This SW has updated RN MASOOD on case status) as she was having difficulty reaching the patient by telephone - also requested that RN CM assist patient with making sure she has the name of the correct PCP that she is to see on 1/31 - transportation with [employer] aide has already been scheduled for this  - KENJI will continue to follow to ensure that patient has resources and how to access them ([employer]).

## 2018-01-30 ENCOUNTER — PATIENT OUTREACH (OUTPATIENT)
Dept: CASE MANAGEMENT | Age: 68
End: 2018-01-30

## 2018-01-30 NOTE — PROGRESS NOTES
MASOOD was referred to patient, who was reported to be established with [facility], however further review  and being able to reach the patient, this is not correct. KENJI CORREIA has been able to speak with her, first, and reported the transportation issue was resolved. The patient has Medicaid and CLTC, so transportation should not be an issue. Patient would benefit from care coordination; and reducing risks of duplication of services, she sees MDs with [facility] and Sarah Murillo and many are duplicated providers. MASOOD RN spoke with her today, she has decided not to attend the appointment with Rockey Baumgarten MD for tomorrow, to establish care, because she is not connected to a hospital system. She now wants to established care with [facility]: SHANNON MARIN, her appointment is 2/7/2018 11:10 AM with Shoshana Carpio NP. Patient was able to inform MASOOD RN of the address of the practice, confirming she was aware of the appointment. However she still has upcoming appointments with [facility] BERMAN for February with Internal Medicine. MASOOD RN referred patient to Keyon Herrera, as this patient will establish care with [facility]: SHANNON MARIN, MASOOD RN unsure why the patient has already been assigned to Dr. Dannie Head office. Note routed to [facility] as well         This note will not be viewable in [address]

## 2018-01-31 ENCOUNTER — PATIENT OUTREACH (OUTPATIENT)
Dept: CASE MANAGEMENT | Age: 68
End: 2018-01-31

## 2018-01-31 NOTE — PROGRESS NOTES
· Patient phoned me earlier and left me a message while I was on the phone with another patient. · I phoned patient back and UTR left  message for patient. · Attempted to outreach to patient's Ritesh Garner [phone] UTR no answer. · Will attempt one more outreach before the EOB today if patient has not returned my call at that time. · 4:45pm attempted outreach again and went to    · Will attempt outreach again tomorrow. This note will not be viewable in [address]

## 2018-02-01 ENCOUNTER — PATIENT OUTREACH (OUTPATIENT)
Dept: CASE MANAGEMENT | Age: 68
End: 2018-02-01

## 2018-02-01 NOTE — PROGRESS NOTES
· Attempted outreach to patient - UTR left VM message. · Outreached to emergency contact, daughter Alaina Villagran and she stated she would get her mother to phone me back  · Awaiting return phone call from patient   · 4pm still no return phone call   · Will attempt outreach #3 to patient tomorrow  This note will not be viewable in [address]

## 2018-02-02 ENCOUNTER — PATIENT OUTREACH (OUTPATIENT)
Dept: CASE MANAGEMENT | Age: 68
End: 2018-02-02

## 2018-02-02 NOTE — PROGRESS NOTES
Attempted outreach #3 and no answer - left VM for patient. Will attempt one more outreach next week. This note will not be viewable in [address]

## 2018-02-06 ENCOUNTER — PATIENT OUTREACH (OUTPATIENT)
Dept: CASE MANAGEMENT | Age: 68
End: 2018-02-06

## 2018-02-06 NOTE — PROGRESS NOTES
Attempted outreach on 2/5/18 and 2/6/18 - UTR left VM message. Case closed at this time - will re-open if patient outreaches to me. This note will not be viewable in [address]

## 2018-02-07 ENCOUNTER — PATIENT OUTREACH (OUTPATIENT)
Dept: CASE MANAGEMENT | Age: 68
End: 2018-02-07

## 2018-02-07 NOTE — PROGRESS NOTES
Case closed after numerous unsuccessful attempts (at least 5 outreach attempts) to outreach to patient. Will reopen case if patient were to phone me and return my phone calls. This note will not be viewable in [address]

## 2018-03-21 ENCOUNTER — HOSPITAL ENCOUNTER (EMERGENCY)
Age: 68
Discharge: HOME | End: 2018-03-21
Payer: MEDICARE

## 2018-03-21 VITALS
OXYGEN SATURATION: 98 % | HEART RATE: 75 BPM | SYSTOLIC BLOOD PRESSURE: 178 MMHG | TEMPERATURE: 97.8 F | RESPIRATION RATE: 16 BRPM | DIASTOLIC BLOOD PRESSURE: 89 MMHG

## 2018-03-21 DIAGNOSIS — R10.84 ABDOMINAL PAIN, GENERALIZED: Primary | ICD-10-CM

## 2018-03-21 LAB
ATRIAL RATE: 113 BPM
BACTERIA URNS QL MICRO: 0 /HPF
CALCULATED P AXIS, ECG09: 51 DEGREES
CALCULATED R AXIS, ECG10: 40 DEGREES
CALCULATED T AXIS, ECG11: 74 DEGREES
CASTS URNS QL MICRO: 0 /LPF
CRP SERPL-MCNC: <0.2 MG/DL (ref 0–0.9)
DIAGNOSIS, 93000: NORMAL
EPI CELLS #/AREA URNS HPF: NORMAL /HPF
P-R INTERVAL, ECG05: 140 MS
Q-T INTERVAL, ECG07: 380 MS
QRS DURATION, ECG06: 74 MS
QTC CALCULATION (BEZET), ECG08: 404 MS
RBC #/AREA URNS HPF: NORMAL /HPF
VENTRICULAR RATE, ECG03: 68 BPM
WBC URNS QL MICRO: NORMAL /HPF

## 2018-03-21 PROCEDURE — 81015 MICROSCOPIC EXAM OF URINE: CPT

## 2018-03-21 PROCEDURE — 74011250637 HC RX REV CODE- 250/637

## 2018-03-21 PROCEDURE — 99284 EMERGENCY DEPT VISIT MOD MDM: CPT

## 2018-03-21 PROCEDURE — 93005 ELECTROCARDIOGRAM TRACING: CPT

## 2018-03-21 PROCEDURE — 86140 C-REACTIVE PROTEIN: CPT

## 2018-03-21 RX ORDER — HYDROGEN PEROXIDE 3 %
20 SOLUTION, NON-ORAL MISCELLANEOUS DAILY
Qty: 20 CAP | Refills: 0 | Status: SHIPPED | OUTPATIENT
Start: 2018-03-21 | End: 2018-04-10

## 2018-03-21 RX ORDER — CLOTRIMAZOLE 10 MG/1
10 LOZENGE ORAL; TOPICAL 4 TIMES DAILY
Status: ON HOLD | COMMUNITY
End: 2018-09-06

## 2018-03-21 RX ORDER — DICYCLOMINE HYDROCHLORIDE 20 MG/1
20 TABLET ORAL EVERY 6 HOURS
Qty: 20 TAB | Refills: 0 | Status: SHIPPED | OUTPATIENT
Start: 2018-03-21 | End: 2018-03-27

## 2018-03-21 RX ORDER — ERGOCALCIFEROL 1.25 MG/1
50000 CAPSULE ORAL
COMMUNITY
End: 2019-04-12

## 2018-03-21 RX ORDER — LORATADINE 10 MG/1
10 TABLET ORAL
COMMUNITY

## 2018-03-21 RX ADMIN — ACETAMINOPHEN 650 MG: 160 SOLUTION ORAL at 01:55

## 2018-03-21 NOTE — ED NOTES
Pt bp 178 89 , pt took her own bp meds as oked per md, pt states she hasn't been her bp medication recently because sometimes her bp will drop, pt also stats she isnt always taking her bp parkinson meds because it makes her nauseated. I have reviewed discharge instructions with the patient. The patient verbalized understanding. Patient left ED via Discharge Method: wheelchair to Home with cab. Opportunity for questions and clarification provided. Patient given 0 scripts. To continue your aftercare when you leave the hospital, you may receive an automated call from our care team to check in on how you are doing. This is a free service and part of our promise to provide the best care and service to meet your aftercare needs.  If you have questions, or wish to unsubscribe from this service please call [phone]. Thank you for Choosing our [facility] Emergency Department.

## 2018-03-21 NOTE — DISCHARGE INSTRUCTIONS
Abdominal Pain: Care Instructions  Your Care Instructions    Abdominal pain has many possible causes. Some aren't serious and get better on their own in a few days. Others need more testing and treatment. If your pain continues or gets worse, you need to be rechecked and may need more tests to find out what is wrong. You may need surgery to correct the problem. Don't ignore new symptoms, such as fever, nausea and vomiting, urination problems, pain that gets worse, and dizziness. These may be signs of a more serious problem. Your doctor may have recommended a follow-up visit in the next 8 to 12 hours. If you are not getting better, you may need more tests or treatment. The doctor has checked you carefully, but problems can develop later. If you notice any problems or new symptoms, get medical treatment right away. Follow-up care is a key part of your treatment and safety. Be sure to make and go to all appointments, and call your doctor if you are having problems. It's also a good idea to know your test results and keep a list of the medicines you take. How can you care for yourself at home? · Rest until you feel better. · To prevent dehydration, drink plenty of fluids, enough so that your urine is light yellow or clear like water. Choose water and other caffeine-free clear liquids until you feel better. If you have kidney, heart, or liver disease and have to limit fluids, talk with your doctor before you increase the amount of fluids you drink. · If your stomach is upset, eat mild foods, such as rice, dry toast or crackers, bananas, and applesauce. Try eating several small meals instead of two or three large ones. · Wait until 48 hours after all symptoms have gone away before you have spicy foods, alcohol, and drinks that contain caffeine. · Do not eat foods that are high in fat. · Avoid anti-inflammatory medicines such as aspirin, ibuprofen (Advil, Motrin), and naproxen (Aleve).  These can cause stomach upset. Talk to your doctor if you take daily aspirin for another health problem. When should you call for help? Call 911 anytime you think you may need emergency care. For example, call if:  ? · You passed out (lost consciousness). ? · You pass maroon or very bloody stools. ? · You vomit blood or what looks like coffee grounds. ? · You have new, severe belly pain. ?Call your doctor now or seek immediate medical care if:  ? · Your pain gets worse, especially if it becomes focused in one area of your belly. ? · You have a new or higher fever. ? · Your stools are black and look like tar, or they have streaks of blood. ? · You have unexpected vaginal bleeding. ? · You have symptoms of a urinary tract infection. These may include:  ¨ Pain when you urinate. ¨ Urinating more often than usual.  ¨ Blood in your urine. ? · You are dizzy or lightheaded, or you feel like you may faint. ? Watch closely for changes in your health, and be sure to contact your doctor if:  ? · You are not getting better after 1 day (24 hours). Where can you learn more? Go to http://livan-brit.info/. Enter I248 in the search box to learn more about \"Abdominal Pain: Care Instructions. \"  Current as of: March 20, 2017  Content Version: 11.4  © 0335-2358 "[employer]. Care instructions adapted under license by [employer] (which disclaims liability or warranty for this information). If you have questions about a medical condition or this instruction, always ask your healthcare professional. [place] [address] any warranty or liability for your use of this information.

## 2018-03-21 NOTE — ED PROVIDER NOTES
HPI Comments: 15-year-old female complaining of abdominal pain she points to the suprapubic area and epigastric area where her pain is located. She states has been going on for several months she was in the hospital in December for the same pain. She was told she had diverticulitis. She followed up with her primary care physician who suggested that she get a CAT scan. Patient is a 79 y.o. female presenting with abdominal pain. Abdominal Pain    This is a new problem. The current episode started more than 1 week ago. The problem occurs constantly. The problem has not changed since onset. The pain is located in the suprapubic region. The quality of the pain is dull. The pain is at a severity of 5/10. Nothing worsens the pain. The pain is relieved by nothing. The patient's surgical history non-contributory.        Past Medical History:   Diagnosis Date    Abnormal gait 7/5/2016    Acute serous otitis media of left ear     Anxiety     Bilateral carpal tunnel syndrome 7/5/2016    Chronic abdominal pain 7/5/2016    Dementia due to Parkinson's disease without behavioral disturbance ([address] 7/5/2016    Depression     Diverticulitis     Diverticulitis     Gastritis     GERD (gastroesophageal reflux disease)     Heart murmur     Hypercholesterolemia     Hypertension     daily meds, on norvasc prn- took one yesterday for BP of 152/68    Neuropathic pain 7/5/2016    Nontoxic multinodular goiter 7/5/2016    Orthostatic hypotension     Orthostatic hypotension     Parkinson's disease ([address]     dx 2014- sometimes requires walker or wheelchair when tired    Parkinson's disease ([address]     Peripheral neuropathy     Syncope 10/10/2015    Type 2 diabetes mellitus without complication 0/7/0270    \"They thought I was a diabetic\" - no meds- started checking glucose 2 yrs ago; last A1C=? ; has not checked since 5 mos ago (June 2016)    Urinary frequency 7/5/2016    Vaginal bleeding     Vomiting     Weight loss  Weight loss, unintentional     50 lbs over 2 yrs       Past Surgical History:   Procedure Laterality Date    HX APPENDECTOMY  1974    HX CHOLECYSTECTOMY  2001    HX TUBAL LIGATION  1974         Family History:   Problem Relation Age of Onset    Diabetes Mother     Alzheimer Mother     Diabetes Father     Cancer Father      prostate    Stroke Father     Diabetes Sister     Diabetes Brother     Cancer Brother      kidney and prostate    Sickle Cell Anemia Son      2 sons     Breast Cancer Neg Hx        Social History     Social History    Marital status:      Spouse name: N/A    Number of children: N/A    Years of education: N/A     Occupational History    Not on file. Social History Main Topics    Smoking status: Never Smoker    Smokeless tobacco: Never Used    Alcohol use No    Drug use: No    Sexual activity: Not on file     Other Topics Concern    Not on file     Social History Narrative         ALLERGIES: Flagyl [metronidazole]; Aspirin; Contrast agent [iodine]; Gabapentin; Milk containing products; Omnipaque rediflo [iohexol]; and Red dye    Review of Systems   Constitutional: Negative. Negative for activity change. HENT: Negative. Eyes: Negative. Respiratory: Negative. Cardiovascular: Negative. Gastrointestinal: Positive for abdominal pain. Genitourinary: Negative. Musculoskeletal: Negative. Skin: Negative. Neurological: Negative. Psychiatric/Behavioral: Negative. All other systems reviewed and are negative. Vitals:    03/20/18 2357   BP: 179/75   Pulse: 75   Resp: 18   Temp: 97.8 °F (36.6 °C)   SpO2: 98%            Physical Exam   Constitutional: She is oriented to person, place, and time. She appears well-developed and well-nourished. No distress. HENT:   Head: Normocephalic and atraumatic.    Right Ear: External ear normal.   Left Ear: External ear normal.   Nose: Nose normal.   Mouth/Throat: Oropharynx is clear and moist. No oropharyngeal exudate. Eyes: Conjunctivae and EOM are normal. Pupils are equal, round, and reactive to light. Right eye exhibits no discharge. Left eye exhibits no discharge. No scleral icterus. Neck: Normal range of motion. Neck supple. No JVD present. No tracheal deviation present. Cardiovascular: Normal rate, regular rhythm and intact distal pulses. Pulmonary/Chest: Effort normal and breath sounds normal. No stridor. No respiratory distress. She has no wheezes. She exhibits no tenderness. Abdominal: Soft. Bowel sounds are normal. She exhibits no distension and no mass. There is no tenderness. Musculoskeletal: Normal range of motion. She exhibits no edema or tenderness. Neurological: She is alert and oriented to person, place, and time. No cranial nerve deficit. Skin: Skin is warm and dry. No rash noted. She is not diaphoretic. No erythema. No pallor. Psychiatric: She has a normal mood and affect. Her behavior is normal. Thought content normal.   Nursing note and vitals reviewed.        MDM  Number of Diagnoses or Management Options  Abdominal pain, generalized:      Amount and/or Complexity of Data Reviewed  Clinical lab tests: ordered and reviewed  Tests in the radiology section of CPT®: ordered and reviewed  Tests in the medicine section of CPT®: ordered and reviewed    Risk of Complications, Morbidity, and/or Mortality  Presenting problems: moderate  Diagnostic procedures: moderate  Management options: moderate          ED Course       Procedures

## 2018-03-21 NOTE — ED TRIAGE NOTES
Pt in via EMS from home with complaints of neck, back, epigastric, leg, and foot pain with numbness and tingling in her feet from neuropathy, as well as nausea X1 month. Complains of chest tightness and burning. Pt has not been taking her Gabapentin because she states it makes her nauseated. When asked what her main complaint is, she states \"everything. \" Also states she can't take her parkinson's medication or Gabapentin because they give her nausea.

## 2018-05-24 ENCOUNTER — HOSPITAL ENCOUNTER (EMERGENCY)
Age: 68
Discharge: HOME | End: 2018-05-24
Attending: EMERGENCY MEDICINE
Payer: MEDICARE

## 2018-05-24 VITALS
DIASTOLIC BLOOD PRESSURE: 85 MMHG | RESPIRATION RATE: 16 BRPM | HEIGHT: 67 IN | HEART RATE: 80 BPM | BODY MASS INDEX: 17.42 KG/M2 | OXYGEN SATURATION: 100 % | WEIGHT: 111 LBS | SYSTOLIC BLOOD PRESSURE: 191 MMHG | TEMPERATURE: 98.2 F

## 2018-05-24 DIAGNOSIS — K29.90 GASTRITIS AND DUODENITIS: Primary | ICD-10-CM

## 2018-05-24 LAB
ALBUMIN SERPL-MCNC: 3.4 G/DL (ref 3.2–4.6)
ALBUMIN/GLOB SERPL: 1 {RATIO} (ref 1.2–3.5)
ALP SERPL-CCNC: 65 U/L (ref 50–136)
ALT SERPL-CCNC: <6 U/L (ref 12–65)
ANION GAP SERPL CALC-SCNC: 8 MMOL/L (ref 7–16)
AST SERPL-CCNC: 13 U/L (ref 15–37)
BACTERIA URNS QL MICRO: 0 /HPF
BASOPHILS # BLD: 0 K/UL (ref 0–0.2)
BASOPHILS NFR BLD: 0 % (ref 0–2)
BILIRUB SERPL-MCNC: 0.5 MG/DL (ref 0.2–1.1)
BUN SERPL-MCNC: 10 MG/DL (ref 8–23)
CALCIUM SERPL-MCNC: 8.8 MG/DL (ref 8.3–10.4)
CASTS URNS QL MICRO: 0 /LPF
CHLORIDE SERPL-SCNC: 108 MMOL/L (ref 98–107)
CO2 SERPL-SCNC: 27 MMOL/L (ref 21–32)
CREAT SERPL-MCNC: 1.26 MG/DL (ref 0.6–1)
CRYSTALS URNS QL MICRO: 0 /LPF
DIFFERENTIAL METHOD BLD: ABNORMAL
EOSINOPHIL # BLD: 0 K/UL (ref 0–0.8)
EOSINOPHIL NFR BLD: 0 % (ref 0.5–7.8)
EPI CELLS #/AREA URNS HPF: NORMAL /HPF
ERYTHROCYTE [DISTWIDTH] IN BLOOD BY AUTOMATED COUNT: 14.5 % (ref 11.9–14.6)
GLOBULIN SER CALC-MCNC: 3.5 G/DL (ref 2.3–3.5)
GLUCOSE SERPL-MCNC: 87 MG/DL (ref 65–100)
HCT VFR BLD AUTO: 34.9 % (ref 35.8–46.3)
HGB BLD-MCNC: 11.6 G/DL (ref 11.7–15.4)
IMM GRANULOCYTES # BLD: 0.1 K/UL (ref 0–0.5)
IMM GRANULOCYTES NFR BLD AUTO: 1 % (ref 0–5)
LACTATE BLD-SCNC: 0.4 MMOL/L (ref 0.5–1.9)
LIPASE SERPL-CCNC: 197 U/L (ref 73–393)
LYMPHOCYTES # BLD: 1.8 K/UL (ref 0.5–4.6)
LYMPHOCYTES NFR BLD: 20 % (ref 13–44)
MCH RBC QN AUTO: 26.9 PG (ref 26.1–32.9)
MCHC RBC AUTO-ENTMCNC: 33.2 G/DL (ref 31.4–35)
MCV RBC AUTO: 80.8 FL (ref 79.6–97.8)
MONOCYTES # BLD: 0.7 K/UL (ref 0.1–1.3)
MONOCYTES NFR BLD: 8 % (ref 4–12)
MUCOUS THREADS URNS QL MICRO: 0 /LPF
NEUTS SEG # BLD: 6.5 K/UL (ref 1.7–8.2)
NEUTS SEG NFR BLD: 71 % (ref 43–78)
PLATELET # BLD AUTO: 253 K/UL (ref 150–450)
PMV BLD AUTO: 10.1 FL (ref 10.8–14.1)
POTASSIUM SERPL-SCNC: 4 MMOL/L (ref 3.5–5.1)
PROT SERPL-MCNC: 6.9 G/DL (ref 6.3–8.2)
RBC # BLD AUTO: 4.32 M/UL (ref 4.05–5.25)
RBC #/AREA URNS HPF: NORMAL /HPF
SODIUM SERPL-SCNC: 143 MMOL/L (ref 136–145)
WBC # BLD AUTO: 9 K/UL (ref 4.3–11.1)
WBC URNS QL MICRO: NORMAL /HPF

## 2018-05-24 PROCEDURE — 96361 HYDRATE IV INFUSION ADD-ON: CPT | Performed by: EMERGENCY MEDICINE

## 2018-05-24 PROCEDURE — 99285 EMERGENCY DEPT VISIT HI MDM: CPT | Performed by: EMERGENCY MEDICINE

## 2018-05-24 PROCEDURE — 96374 THER/PROPH/DIAG INJ IV PUSH: CPT | Performed by: EMERGENCY MEDICINE

## 2018-05-24 PROCEDURE — 83605 ASSAY OF LACTIC ACID: CPT

## 2018-05-24 PROCEDURE — 81003 URINALYSIS AUTO W/O SCOPE: CPT | Performed by: EMERGENCY MEDICINE

## 2018-05-24 PROCEDURE — 81015 MICROSCOPIC EXAM OF URINE: CPT | Performed by: EMERGENCY MEDICINE

## 2018-05-24 PROCEDURE — 74011000250 HC RX REV CODE- 250: Performed by: EMERGENCY MEDICINE

## 2018-05-24 PROCEDURE — 80053 COMPREHEN METABOLIC PANEL: CPT | Performed by: EMERGENCY MEDICINE

## 2018-05-24 PROCEDURE — 85025 COMPLETE CBC W/AUTO DIFF WBC: CPT | Performed by: EMERGENCY MEDICINE

## 2018-05-24 PROCEDURE — 96375 TX/PRO/DX INJ NEW DRUG ADDON: CPT | Performed by: EMERGENCY MEDICINE

## 2018-05-24 PROCEDURE — 74011250636 HC RX REV CODE- 250/636: Performed by: EMERGENCY MEDICINE

## 2018-05-24 PROCEDURE — 74011250637 HC RX REV CODE- 250/637: Performed by: EMERGENCY MEDICINE

## 2018-05-24 PROCEDURE — 83690 ASSAY OF LIPASE: CPT | Performed by: EMERGENCY MEDICINE

## 2018-05-24 RX ORDER — ONDANSETRON 2 MG/ML
4 INJECTION INTRAMUSCULAR; INTRAVENOUS
Status: COMPLETED | OUTPATIENT
Start: 2018-05-24 | End: 2018-05-24

## 2018-05-24 RX ORDER — KETOROLAC TROMETHAMINE 30 MG/ML
15 INJECTION, SOLUTION INTRAMUSCULAR; INTRAVENOUS
Status: COMPLETED | OUTPATIENT
Start: 2018-05-24 | End: 2018-05-24

## 2018-05-24 RX ORDER — LIDOCAINE HYDROCHLORIDE 20 MG/ML
15 SOLUTION OROPHARYNGEAL
Status: COMPLETED | OUTPATIENT
Start: 2018-05-24 | End: 2018-05-24

## 2018-05-24 RX ADMIN — ONDANSETRON 4 MG: 2 INJECTION INTRAMUSCULAR; INTRAVENOUS at 22:06

## 2018-05-24 RX ADMIN — Medication 30 ML: at 22:06

## 2018-05-24 RX ADMIN — KETOROLAC TROMETHAMINE 15 MG: 30 INJECTION, SOLUTION INTRAMUSCULAR at 22:06

## 2018-05-24 RX ADMIN — LIDOCAINE HYDROCHLORIDE 15 ML: 20 SOLUTION ORAL; TOPICAL at 22:06

## 2018-05-24 RX ADMIN — SODIUM CHLORIDE 1000 ML: 900 INJECTION, SOLUTION INTRAVENOUS at 22:04

## 2018-05-25 NOTE — ED NOTES
Pt took two sips of her GI cocktail with lidocaine and refused the rest, sighting she \"couldn't do it. \"

## 2018-05-25 NOTE — DISCHARGE INSTRUCTIONS
Gastritis: Care Instructions  Your Care Instructions    Gastritis is a sore and upset stomach. It happens when something irritates the stomach lining. Many things can cause it. These include an infection such as the flu or something you ate or drank. Medicines or a sore on the lining of the stomach (ulcer) also can cause it. Your belly may bloat and ache. You may belch, vomit, and feel sick to your stomach. You should be able to relieve the problem by taking medicine. And it may help to change your diet. If gastritis lasts, your doctor may prescribe medicine. Follow-up care is a key part of your treatment and safety. Be sure to make and go to all appointments, and call your doctor if you are having problems. It's also a good idea to know your test results and keep a list of the medicines you take. How can you care for yourself at home? · If your doctor prescribed antibiotics, take them as directed. Do not stop taking them just because you feel better. You need to take the full course of antibiotics. · Be safe with medicines. If your doctor prescribed medicine to decrease stomach acid, take it as directed. Call your doctor if you think you are having a problem with your medicine. · Do not take any other medicine, including over-the-counter pain relievers, without talking to your doctor first.  · If your doctor recommends over-the-counter medicine to reduce stomach acid, such as Pepcid AC, Prilosec, Tagamet HB, or Zantac 75, follow the directions on the label. · Drink plenty of fluids (enough so that your urine is light yellow or clear like water) to prevent dehydration. Choose water and other caffeine-free clear liquids. If you have kidney, heart, or liver disease and have to limit fluids, talk with your doctor before you increase the amount of fluids you drink. · Limit how much alcohol you drink. · Avoid coffee, tea, cola drinks, chocolate, and other foods with caffeine.  They increase stomach acid.  When should you call for help? Call 911 anytime you think you may need emergency care. For example, call if:  ? · You vomit blood or what looks like coffee grounds. ? · You pass maroon or very bloody stools. ?Call your doctor now or seek immediate medical care if:  ? · You start breathing fast and have not produced urine in the last 8 hours. ? · You cannot keep fluids down. ? Watch closely for changes in your health, and be sure to contact your doctor if:  ? · You do not get better as expected. Where can you learn more? Go to http://livan-brit.info/. Enter [id number] in the search box to learn more about \"Gastritis: Care Instructions. \"  Current as of: May 12, 2017  Content Version: 11.4  © 8873-2526 [employer]. Care instructions adapted under license by [employer] (which disclaims liability or warranty for this information). If you have questions about a medical condition or this instruction, always ask your healthcare professional. [address] any warranty or liability for your use of this information.

## 2018-05-25 NOTE — ED TRIAGE NOTES
Pt arrived via [facility] M [facility] with c/o abdominal pain x 3 months. \"I've been hurting for 3 to 4 months, can't eat nothing that isn't soft, and it feels like someone stabbing me. \" States N&V, and constipation.

## 2018-05-25 NOTE — ED NOTES
I have reviewed discharge instructions with the patient. The patient verbalized understanding. Patient left ED via Discharge Method: stretcher to Home with self via [facility]. Opportunity for questions and clarification provided. Patient given 0 scripts. To continue your aftercare when you leave the hospital, you may receive an automated call from our care team to check in on how you are doing. This is a free service and part of our promise to provide the best care and service to meet your aftercare needs.  If you have questions, or wish to unsubscribe from this service please call [phone]. Thank you for Choosing our [facility] Emergency Department.

## 2018-05-25 NOTE — ED PROVIDER NOTES
HPI Comments: Patient with history of cholecystectomy and appendectomy. End of last year was diagnosed with diverticulitis with abscess. Has been seen in multiple ERs since then along with follow-up with surgery and GI. Last CAT scan per surgery note shows complete resolution of diverticulitis and abscess. Patient was a no show to her gastric emptying study  And had her April colonoscopy canceled. She is not returning caused to patient outreach starting in February. Returns complaining of continued abdominal pain and nausea and vomiting. Does have history of acid reflux also. Hurts in the epigastric area. Denies any lower abdominal pain. Patient is a 76 y.o. female presenting with epigastric pain. The history is provided by the patient. No  was used. Epigastric Pain    This is a new problem. Episode onset: 5-6\ months. The problem occurs daily. The problem has not changed since onset. The pain is associated with an unknown factor. The pain is located in the epigastric region, LUQ, RUQ and chest. The quality of the pain is burning. The pain is moderate. Associated symptoms include nausea, vomiting and constipation. Pertinent negatives include no fever, no diarrhea, no melena, no dysuria, no hematuria, no headaches, no chest pain and no back pain. Nothing worsens the pain. The pain is relieved by nothing. Past workup includes CT scan. Her past medical history is significant for diverticulitis. The patient's surgical history includes cholecystectomy.        Past Medical History:   Diagnosis Date    Abnormal gait 7/5/2016    Acute serous otitis media of left ear     Anxiety     Bilateral carpal tunnel syndrome 7/5/2016    Chronic abdominal pain 7/5/2016    Dementia due to Parkinson's disease without behavioral disturbance ([facility] [address] 7/5/2016    Depression     Diverticulitis     Diverticulitis     Gastritis     GERD (gastroesophageal reflux disease)     Heart murmur     Hypercholesterolemia     Hypertension     daily meds, on norvasc prn- took one yesterday for BP of 152/68    Neuropathic pain 7/5/2016    Nontoxic multinodular goiter 7/5/2016    Orthostatic hypotension     Orthostatic hypotension     Parkinson's disease ([address]     dx 2014- sometimes requires walker or wheelchair when tired    Parkinson's disease ([address] [address]     Peripheral neuropathy     Syncope 10/10/2015    Type 2 diabetes mellitus without complication 1/8/7202    \"They thought I was a diabetic\" - no meds- started checking glucose 2 yrs ago; last A1C=? ; has not checked since 5 mos ago (June 2016)    Urinary frequency 7/5/2016    Vaginal bleeding     Vomiting     Weight loss     Weight loss, unintentional     50 lbs over 2 yrs       Past Surgical History:   Procedure Laterality Date    HX APPENDECTOMY  1974    HX CHOLECYSTECTOMY  2001    HX TUBAL LIGATION  1974         Family History:   Problem Relation Age of Onset    Diabetes Mother     Alzheimer Mother     Diabetes Father     Cancer Father      prostate    Stroke Father     Diabetes Sister     Diabetes Brother     Cancer Brother      kidney and prostate    Sickle Cell Anemia Son      2 sons     Breast Cancer Neg Hx        Social History     Social History    Marital status:      Spouse name: N/A    Number of children: N/A    Years of education: N/A     Occupational History    Not on file. Social History Main Topics    Smoking status: Never Smoker    Smokeless tobacco: Never Used    Alcohol use No    Drug use: No    Sexual activity: Not on file     Other Topics Concern    Not on file     Social History Narrative         ALLERGIES: Flagyl [metronidazole]; Aspirin; Contrast agent [iodine]; Gabapentin; Milk containing products; Omnipaque rediflo [iohexol]; and Red dye    Review of Systems   Constitutional: Negative for chills and fever. HENT: Negative for rhinorrhea and sore throat. Eyes: Negative for pain and redness. Respiratory: Negative for chest tightness, shortness of breath and wheezing. Cardiovascular: Negative for chest pain and leg swelling. Gastrointestinal: Positive for abdominal pain, constipation, nausea and vomiting. Negative for diarrhea and melena. Genitourinary: Negative for dysuria and hematuria. Musculoskeletal: Negative for back pain, gait problem, neck pain and neck stiffness. Skin: Negative for color change and rash. Neurological: Negative for weakness, numbness and headaches. Vitals:    05/24/18 2127   BP: 146/79   Pulse: 73   Resp: 18   Temp: 98.3 °F (36.8 °C)   SpO2: 99%   Weight: 50.3 kg (111 lb)   Height: 5' 7\" (1.702 m)            Physical Exam   Constitutional: She is oriented to person, place, and time. She appears well-developed and well-nourished. HENT:   Head: Normocephalic and atraumatic. Neck: Normal range of motion. Neck supple. Cardiovascular: Normal rate and regular rhythm. No murmur heard. Pulmonary/Chest: Effort normal and breath sounds normal. She has no wheezes. Abdominal: Soft. Bowel sounds are normal. There is tenderness (epigastric and upper abd. ). There is no rebound and no guarding. Musculoskeletal: Normal range of motion. She exhibits no edema. Neurological: She is alert and oriented to person, place, and time. Skin: Skin is warm and dry. Nursing note and vitals reviewed. MDM  Number of Diagnoses or Management Options  Diagnosis management comments: Pt with no acute infection on blood work. Likely gastritis. Will discharge with GI follow up.         Amount and/or Complexity of Data Reviewed  Clinical lab tests: ordered and reviewed  Tests in the radiology section of CPT®: reviewed  Tests in the medicine section of CPT®: ordered and reviewed    Patient Progress  Patient progress: stable        ED Course       Procedures    Results Include:    Recent Results (from the past 24 hour(s))   CBC WITH AUTOMATED DIFF    Collection Time: 05/24/18 9:47 PM   Result Value Ref Range    WBC 9.0 4.3 - 11.1 K/uL    RBC 4.32 4.05 - 5.25 M/uL    HGB 11.6 (L) 11.7 - 15.4 g/dL    HCT 34.9 (L) 35.8 - 46.3 %    MCV 80.8 79.6 - 97.8 FL    MCH 26.9 26.1 - 32.9 PG    MCHC 33.2 31.4 - 35.0 g/dL    RDW 14.5 11.9 - 14.6 %    PLATELET 239 223 - 566 K/uL    MPV 10.1 (L) 10.8 - 14.1 FL    DF AUTOMATED      NEUTROPHILS 71 43 - 78 %    LYMPHOCYTES 20 13 - 44 %    MONOCYTES 8 4.0 - 12.0 %    EOSINOPHILS 0 (L) 0.5 - 7.8 %    BASOPHILS 0 0.0 - 2.0 %    IMMATURE GRANULOCYTES 1 0.0 - 5.0 %    ABS. NEUTROPHILS 6.5 1.7 - 8.2 K/UL    ABS. LYMPHOCYTES 1.8 0.5 - 4.6 K/UL    ABS. MONOCYTES 0.7 0.1 - 1.3 K/UL    ABS. EOSINOPHILS 0.0 0.0 - 0.8 K/UL    ABS. BASOPHILS 0.0 0.0 - 0.2 K/UL    ABS. IMM. GRANS. 0.1 0.0 - 0.5 K/UL   METABOLIC PANEL, COMPREHENSIVE    Collection Time: 05/24/18  9:47 PM   Result Value Ref Range    Sodium 143 136 - 145 mmol/L    Potassium 4.0 3.5 - 5.1 mmol/L    Chloride 108 (H) 98 - 107 mmol/L    CO2 27 21 - 32 mmol/L    Anion gap 8 7 - 16 mmol/L    Glucose 87 65 - 100 mg/dL    BUN 10 8 - 23 MG/DL    Creatinine 1.26 (H) 0.6 - 1.0 MG/DL    GFR est AA 54 (L) >60 ml/min/1.73m2    GFR est non-AA 45 (L) >60 ml/min/1.73m2    Calcium 8.8 8.3 - 10.4 MG/DL    Bilirubin, total 0.5 0.2 - 1.1 MG/DL    ALT (SGPT) <6 (L) 12 - 65 U/L    AST (SGOT) 13 (L) 15 - 37 U/L    Alk.  phosphatase 65 50 - 136 U/L    Protein, total 6.9 6.3 - 8.2 g/dL    Albumin 3.4 3.2 - 4.6 g/dL    Globulin 3.5 2.3 - 3.5 g/dL    A-G Ratio 1.0 (L) 1.2 - 3.5     LIPASE    Collection Time: 05/24/18  9:47 PM   Result Value Ref Range    Lipase 197 73 - 393 U/L   POC LACTIC ACID    Collection Time: 05/24/18  9:55 PM   Result Value Ref Range    Lactic Acid (POC) 0.4 (LL) 0.5 - 1.9 mmol/L   URINE MICROSCOPIC    Collection Time: 05/24/18 10:19 PM   Result Value Ref Range    WBC 3-5 0 /hpf    RBC 0-3 0 /hpf    Epithelial cells 0-3 0 /hpf    Bacteria 0 0 /hpf    Casts 0 0 /lpf    Crystals, urine 0 0 /LPF    Mucus 0 0 /lpf

## 2018-05-31 ENCOUNTER — HOSPITAL ENCOUNTER (OUTPATIENT)
Dept: CT IMAGING | Age: 68
Discharge: HOME | End: 2018-05-31
Attending: INTERNAL MEDICINE
Payer: MEDICARE

## 2018-05-31 DIAGNOSIS — R10.9 UNSPECIFIED ABDOMINAL PAIN: ICD-10-CM

## 2018-05-31 PROCEDURE — 74011636320 HC RX REV CODE- 636/320: Performed by: INTERNAL MEDICINE

## 2018-05-31 PROCEDURE — 74176 CT ABD & PELVIS W/O CONTRAST: CPT

## 2018-05-31 RX ORDER — SODIUM CHLORIDE 0.9 % (FLUSH) 0.9 %
10 SYRINGE (ML) INJECTION
Status: ACTIVE | OUTPATIENT
Start: 2018-05-31 | End: 2018-05-31

## 2018-05-31 RX ADMIN — DIATRIZOATE MEGLUMINE AND DIATRIZOATE SODIUM 15 ML: 660; 100 LIQUID ORAL; RECTAL at 11:55

## 2018-06-02 ENCOUNTER — HOME HEALTH ADMISSION (OUTPATIENT)
Dept: HOME HEALTH SERVICES | Facility: HOME HEALTH | Age: 68
End: 2018-06-02
Payer: MEDICARE

## 2018-06-02 ENCOUNTER — HOSPITAL ENCOUNTER (EMERGENCY)
Age: 68
Discharge: HOME | End: 2018-06-02
Attending: EMERGENCY MEDICINE
Payer: MEDICARE

## 2018-06-02 VITALS
TEMPERATURE: 98.7 F | RESPIRATION RATE: 18 BRPM | OXYGEN SATURATION: 98 % | SYSTOLIC BLOOD PRESSURE: 129 MMHG | BODY MASS INDEX: 18.83 KG/M2 | HEIGHT: 67 IN | DIASTOLIC BLOOD PRESSURE: 74 MMHG | WEIGHT: 120 LBS | HEART RATE: 98 BPM

## 2018-06-02 DIAGNOSIS — K57.92 DIVERTICULITIS: Primary | ICD-10-CM

## 2018-06-02 LAB
ALBUMIN SERPL-MCNC: 3.7 G/DL (ref 3.2–4.6)
ALBUMIN/GLOB SERPL: 1 {RATIO} (ref 1.2–3.5)
ALP SERPL-CCNC: 70 U/L (ref 50–136)
ALT SERPL-CCNC: <6 U/L (ref 12–65)
ANION GAP SERPL CALC-SCNC: 12 MMOL/L (ref 7–16)
AST SERPL-CCNC: 21 U/L (ref 15–37)
BASOPHILS # BLD: 0 K/UL (ref 0–0.2)
BASOPHILS NFR BLD: 0 % (ref 0–2)
BILIRUB SERPL-MCNC: 0.5 MG/DL (ref 0.2–1.1)
BUN SERPL-MCNC: 9 MG/DL (ref 8–23)
CALCIUM SERPL-MCNC: 8.6 MG/DL (ref 8.3–10.4)
CHLORIDE SERPL-SCNC: 106 MMOL/L (ref 98–107)
CO2 SERPL-SCNC: 24 MMOL/L (ref 21–32)
CREAT SERPL-MCNC: 1.15 MG/DL (ref 0.6–1)
DIFFERENTIAL METHOD BLD: ABNORMAL
EOSINOPHIL # BLD: 0 K/UL (ref 0–0.8)
EOSINOPHIL NFR BLD: 0 % (ref 0.5–7.8)
ERYTHROCYTE [DISTWIDTH] IN BLOOD BY AUTOMATED COUNT: 14.5 % (ref 11.9–14.6)
GLOBULIN SER CALC-MCNC: 3.6 G/DL (ref 2.3–3.5)
GLUCOSE SERPL-MCNC: 123 MG/DL (ref 65–100)
HCT VFR BLD AUTO: 35.2 % (ref 35.8–46.3)
HGB BLD-MCNC: 11.6 G/DL (ref 11.7–15.4)
IMM GRANULOCYTES # BLD: 0 K/UL (ref 0–0.5)
IMM GRANULOCYTES NFR BLD AUTO: 0 % (ref 0–5)
LYMPHOCYTES # BLD: 0.8 K/UL (ref 0.5–4.6)
LYMPHOCYTES NFR BLD: 8 % (ref 13–44)
MCH RBC QN AUTO: 26.5 PG (ref 26.1–32.9)
MCHC RBC AUTO-ENTMCNC: 33 G/DL (ref 31.4–35)
MCV RBC AUTO: 80.5 FL (ref 79.6–97.8)
MONOCYTES # BLD: 0.7 K/UL (ref 0.1–1.3)
MONOCYTES NFR BLD: 7 % (ref 4–12)
NEUTS SEG # BLD: 8.3 K/UL (ref 1.7–8.2)
NEUTS SEG NFR BLD: 85 % (ref 43–78)
PLATELET # BLD AUTO: 335 K/UL (ref 150–450)
PMV BLD AUTO: 11.1 FL (ref 10.8–14.1)
POTASSIUM SERPL-SCNC: 3.9 MMOL/L (ref 3.5–5.1)
PROT SERPL-MCNC: 7.3 G/DL (ref 6.3–8.2)
RBC # BLD AUTO: 4.37 M/UL (ref 4.05–5.25)
SODIUM SERPL-SCNC: 142 MMOL/L (ref 136–145)
WBC # BLD AUTO: 9.8 K/UL (ref 4.3–11.1)

## 2018-06-02 PROCEDURE — 96361 HYDRATE IV INFUSION ADD-ON: CPT | Performed by: EMERGENCY MEDICINE

## 2018-06-02 PROCEDURE — 80053 COMPREHEN METABOLIC PANEL: CPT | Performed by: EMERGENCY MEDICINE

## 2018-06-02 PROCEDURE — 96374 THER/PROPH/DIAG INJ IV PUSH: CPT | Performed by: EMERGENCY MEDICINE

## 2018-06-02 PROCEDURE — 99284 EMERGENCY DEPT VISIT MOD MDM: CPT | Performed by: EMERGENCY MEDICINE

## 2018-06-02 PROCEDURE — 74011250636 HC RX REV CODE- 250/636: Performed by: EMERGENCY MEDICINE

## 2018-06-02 PROCEDURE — 85025 COMPLETE CBC W/AUTO DIFF WBC: CPT | Performed by: EMERGENCY MEDICINE

## 2018-06-02 PROCEDURE — 74011250637 HC RX REV CODE- 250/637: Performed by: EMERGENCY MEDICINE

## 2018-06-02 RX ORDER — AMOXICILLIN AND CLAVULANATE POTASSIUM 875; 125 MG/1; MG/1
TABLET, FILM COATED ORAL
Status: ACTIVE
Start: 2018-06-02 | End: 2018-06-03

## 2018-06-02 RX ORDER — AMOXICILLIN AND CLAVULANATE POTASSIUM 875; 125 MG/1; MG/1
1 TABLET, FILM COATED ORAL 2 TIMES DAILY
Qty: 14 TAB | Refills: 0 | Status: SHIPPED | OUTPATIENT
Start: 2018-06-02 | End: 2018-06-15

## 2018-06-02 RX ORDER — ONDANSETRON 4 MG/1
TABLET, ORALLY DISINTEGRATING ORAL
Status: ACTIVE
Start: 2018-06-02 | End: 2018-06-03

## 2018-06-02 RX ORDER — ONDANSETRON 2 MG/ML
4 INJECTION INTRAMUSCULAR; INTRAVENOUS
Status: COMPLETED | OUTPATIENT
Start: 2018-06-02 | End: 2018-06-02

## 2018-06-02 RX ORDER — AMOXICILLIN AND CLAVULANATE POTASSIUM 875; 125 MG/1; MG/1
1 TABLET, FILM COATED ORAL
Status: COMPLETED | OUTPATIENT
Start: 2018-06-02 | End: 2018-06-02

## 2018-06-02 RX ADMIN — ONDANSETRON 4 MG: 2 INJECTION INTRAMUSCULAR; INTRAVENOUS at 14:51

## 2018-06-02 RX ADMIN — AMOXICILLIN AND CLAVULANATE POTASSIUM 1 TABLET: 875; 125 TABLET, FILM COATED ORAL at 15:22

## 2018-06-02 RX ADMIN — SODIUM CHLORIDE 1000 ML: 900 INJECTION, SOLUTION INTRAVENOUS at 15:23

## 2018-06-02 NOTE — ED TRIAGE NOTES
PMD-Dr Maria Isabel Son. Pt here via EMS with a c/o abdominal pain. Was dx with diverticulitis but has not been taking the antibiotic b/c it \"makes me so sick\". Also, c/o burning sensation in her feet\".

## 2018-06-02 NOTE — PROGRESS NOTES
CM met with patient who states that she lives alone but have a caregiver through [employer] for 4 hours everyday. Patient states that she uses a walker or can to ambulate, needs assistance with all ADLs, and does not drive,. Patient states that she had a fall two weeks ago. Patient states that she has \"near misses\" due to pain, cramping, and \"tiredness\" in her muscles. Patient states that she had [facility] through [address], but the agency only came \"a few times\" and \"didn't really do anything. \" CM asked patient if she felt she would benefit from PT/OT and she stated that she would. CM offered to put in a referral to [address] and patient states that she'd prefer [facility]. Referral to [facility] for PT/OT at patient's request. Patient sees Dr. Ethel Spatz, last seen a week ago. No further needs identified at this time.

## 2018-06-02 NOTE — ED NOTES
I have reviewed discharge instructions with the patient. The patient verbalized understanding. Patient left ED via Discharge Method: ambulatory to Home with self. Opportunity for questions and clarification provided. Patient given 1 scripts. To continue your aftercare when you leave the hospital, you may receive an automated call from our care team to check in on how you are doing. This is a free service and part of our promise to provide the best care and service to meet your aftercare needs.  If you have questions, or wish to unsubscribe from this service please call [phone]. Thank you for Choosing our [facility] Emergency Department.

## 2018-06-02 NOTE — DISCHARGE INSTRUCTIONS
Learning About Diverticulosis and Diverticulitis  What are diverticulosis and diverticulitis? In diverticulosis and diverticulitis, pouches called diverticula form in the wall of the large intestine, or colon. · In diverticulosis, the pouches do not cause any pain or other symptoms. · In diverticulitis, the pouches get inflamed or infected and cause symptoms. Doctors aren't sure what causes these pouches in the colon. But they think that a low-fiber diet may play a role. Without fiber to add bulk to the stool, the colon has to work harder than normal to push the stool forward. The pressure from this may cause pouches to form in weak spots along the colon. Some people with diverticulosis get diverticulitis. But experts don't know why this happens. What are the symptoms? · In diverticulosis, most people don't have symptoms. But pouches sometimes bleed. · In diverticulitis, symptoms may last from a few hours to a week or more. They include:  ¨ Belly pain. This is usually in the lower left side. It is sometimes worse when you move. This is the most common symptom. ¨ Fever and chills. ¨ Bloating and gas. ¨ Diarrhea or constipation. ¨ Nausea and sometimes vomiting. ¨ Not feeling like eating. How can you prevent these problems? You may be able to lower your chance of getting diverticulitis. You can do this by taking steps to prevent constipation. · Eat fruits, vegetables, beans, and whole grains every day. These foods are high in fiber. · Drink plenty of fluids (enough so that your urine is light yellow or clear like water). If you have kidney, heart, or liver disease and have to limit fluids, talk with your doctor before you increase the amount of fluids you drink. · Get at least 30 minutes of exercise on most days of the week. Walking is a good choice. You also may want to do other activities, such as running, swimming, cycling, or playing tennis or team sports.   · Take a fiber supplement, such as Citrucel or Metamucil, every day if needed. Read and follow all instructions on the label. · Schedule time each day for a bowel movement. Having a daily routine may help. Take your time and do not strain when having a bowel movement. Some people avoid nuts, seeds, berries, and popcorn. They believe that these foods might get trapped in the diverticula and cause pain. But there is no proof that these foods cause diverticulitis or make it worse. How are these problems treated? · The best way to treat diverticulosis is to avoid constipation. (See the tips above.)  · Treatment for diverticulitis includes antibiotics and often a change in your diet. You may need only liquids at first. Your doctor may suggest pain medicines for pain or belly cramps. In some cases, surgery may be needed. Follow-up care is a key part of your treatment and safety. Be sure to make and go to all appointments, and call your doctor if you are having problems. It's also a good idea to know your test results and keep a list of the medicines you take. Where can you learn more? Go to http://livan-brit.info/. Enter M116 in the search box to learn more about \"Learning About Diverticulosis and Diverticulitis. \"  Current as of: May 12, 2017  Content Version: 11.4  © 2711-7616 [employer]. Care instructions adapted under license by [employer] (which disclaims liability or warranty for this information). If you have questions about a medical condition or this instruction, always ask your healthcare professional. [place] [address] any warranty or liability for your use of this information.

## 2018-06-02 NOTE — PROGRESS NOTES
[address]  Face to Face Encounter    Patients Name: Elda Turner    YOB: 1950    Ordering Physician: Indigo Sr MD     Primary Diagnosis: Parkinson's    Date of Face to Face:   6/2/2018                                  Face to Face Encounter findings are related to primary reason for home care:   yes. 1. I certify that the patient needs intermittent care as follows: physical therapy: strengthening, stretching/ROM, transfer training, gait/stair training, balance training and pt/caregiver education  occupational therapy:  ADL safety (ie. cooking, bathing, dressing), ROM and pt/caregiver education    2. I certify that this patient is homebound, that is: 1) patient requires the use of a cane and walker device, special transportation, or assistance of another to leave the home; or 2) patient's condition makes leaving the home medically contraindicated; and 3) patient has a normal inability to leave the home and leaving the home requires considerable and taxing effort. Patient may leave the home for infrequent and short duration for medical reasons, and occasional absences for non-medical reasons. Homebound status is due to the following functional limitations: Patient's ambulation limited secondary to severe pain and requires the use of an assistive device and the assistance of a caregiver for safe completion. Patient with strength and ROM deficits limiting ambulation endurance requiring the use of an assistive device and the assistance of a caregiver. Patient deemed temporarily homebound secondary to increased risk for infection when leaving home and going out into the community. Patient with strength deficits limiting the performance of all ADL's without caregiver assistance or the use of an assistive device. Patient with poor safety awareness and is at risk for falls without assistance of another person and the use of an assistive device.   Patient with poor ambulation endurance limiting their safe ability to ascend/descend the required number of steps to leave the home. 3. I certify that this patient is under my care and that I, or a nurse practitioner or  [id number], or clinical nurse specialist, or certified nurse midwife, working with me, had a Face-to-Face Encounter that meets the physician Face-to-Face Encounter requirements. The following are the clinical findings from the [address] [place] [address] encounter that support the need for skilled services and is a summary of the encounter:     See [address]  6/2/2018      THE FOLLOWING TO BE COMPLETED BY THE COMMUNITY PHYSICIAN:    I concur with the findings described above from the F2F encounter that this patient is homebound and in need of a skilled service.     Certifying Physician: _____________________________________      Printed Certifying Physician Name: _____________________________________    Date: _________________

## 2018-06-03 NOTE — ED PROVIDER NOTES
HPI Comments: Patient complains of abdominal pain for several days. Has had a CT scan and was diagnosed with diverticulitis. Started on Flagyl and Cipro. States she is unable to take the antibiotics due to nausea when she takes them. She has had nausea for 2 months. No vomiting. Chills at times. No fever. The pain is not worse. Had a loose BM this am. Has constipation at times and diarrhea at other times. Chronic cough since starting medications for Parkinson's disease. Legs aching frequently. Tremors. No dysuria. Has some urinary frequency. Patient is a 76 y.o. female presenting with abdominal pain. The history is provided by the patient and medical records. Abdominal Pain    This is a new problem. The current episode started more than 2 days ago. The problem has not changed since onset. The pain is located in the generalized abdominal region. The pain is mild. Associated symptoms include diarrhea, nausea, constipation, frequency, arthralgias and myalgias. Pertinent negatives include no fever, no vomiting, no dysuria, no headaches and no chest pain. Nothing worsens the pain. The pain is relieved by nothing. Past workup includes CT scan. Her past medical history is significant for diverticulitis.         Past Medical History:   Diagnosis Date    Abnormal gait 7/5/2016    Acute serous otitis media of left ear     Anxiety     Bilateral carpal tunnel syndrome 7/5/2016    Chronic abdominal pain 7/5/2016    Dementia due to Parkinson's disease without behavioral disturbance ([address] 7/5/2016    Depression     Diverticulitis     Diverticulitis     Gastritis     GERD (gastroesophageal reflux disease)     Heart murmur     Hypercholesterolemia     Hypertension     daily meds, on norvasc prn- took one yesterday for BP of 152/68    Neuropathic pain 7/5/2016    Nontoxic multinodular goiter 7/5/2016    Orthostatic hypotension     Orthostatic hypotension     Parkinson's disease ([address]     dx 2014- sometimes requires walker or wheelchair when tired    [facility]     Peripheral neuropathy     Syncope 10/10/2015    Type 2 diabetes mellitus without complication 3/7/8019    \"They thought I was a diabetic\" - no meds- started checking glucose 2 yrs ago; last A1C=? ; has not checked since 5 mos ago (June 2016)    Urinary frequency 7/5/2016    Vaginal bleeding     Vomiting     Weight loss     Weight loss, unintentional     50 lbs over 2 yrs       Past Surgical History:   Procedure Laterality Date    HX APPENDECTOMY  1974    HX CHOLECYSTECTOMY  2001    HX TUBAL LIGATION  1974         Family History:   Problem Relation Age of Onset    Diabetes Mother     Alzheimer Mother     Diabetes Father     Cancer Father      prostate    Stroke Father     Diabetes Sister     Diabetes Brother     Cancer Brother      kidney and prostate    Sickle Cell Anemia Son      2 sons     Breast Cancer Neg Hx        Social History     Social History    Marital status:      Spouse name: N/A    Number of children: N/A    Years of education: N/A     Occupational History    Not on file. Social History Main Topics    Smoking status: Never Smoker    Smokeless tobacco: Never Used    Alcohol use No    Drug use: No    Sexual activity: Not on file     Other Topics Concern    Not on file     Social History Narrative         ALLERGIES: Flagyl [metronidazole]; Aspirin; Contrast agent [iodine]; Gabapentin; Milk containing products; Omnipaque rediflo [iohexol]; and Red dye    Review of Systems   Constitutional: Positive for chills. Negative for appetite change, diaphoresis and fever. HENT: Negative. Negative for rhinorrhea and trouble swallowing. Respiratory: Positive for cough. Negative for chest tightness and shortness of breath. Cardiovascular: Negative for chest pain. Gastrointestinal: Positive for abdominal pain, constipation, diarrhea and nausea. Negative for blood in stool and vomiting. Genitourinary: Positive for frequency. Negative for dysuria and pelvic pain. Musculoskeletal: Positive for arthralgias, gait problem and myalgias. Skin: Negative. Neurological: Positive for tremors and weakness. Negative for speech difficulty and headaches. Hematological: Negative. Psychiatric/Behavioral: Positive for sleep disturbance. Vitals:    06/02/18 1316 06/02/18 1640   BP: 130/76 129/74   Pulse: (!) 101 98   Resp: 18 18   Temp: 98.7 °F (37.1 °C)    SpO2: 99% 98%   Weight: 54.4 kg (120 lb)    Height: 5' 7\" (1.702 m)             Physical Exam   Constitutional: She is oriented to person, place, and time. Thin, appears older than her biological age   HENT:   Head: Normocephalic and atraumatic. Mouth/Throat: Oropharynx is clear and moist.   Eyes: Conjunctivae and EOM are normal. Pupils are equal, round, and reactive to light. Neck: Normal range of motion. Neck supple. No JVD present. Cardiovascular: Normal rate, regular rhythm, normal heart sounds and intact distal pulses. Pulmonary/Chest: Effort normal and breath sounds normal.   Abdominal: Soft. Bowel sounds are normal. She exhibits no distension and no mass. There is no tenderness. Musculoskeletal: She exhibits no edema, tenderness or deformity. Neurological: She is alert and oriented to person, place, and time. Tremors left leg and left arm   Skin: Skin is warm and dry. Psychiatric: She has a normal mood and affect. Nursing note and vitals reviewed. MDM      ED Course       Procedures    Abdominal pain - diverticulitis on recent CT  Not tolerating Flagyl and Cipro  Augmentin 875 po  Zofran 4 mg IV, IVF's NS 1 liter  Improved  Tolerated the Augmentin  Rx Augmentin 875 mg bid  Labs reviewed  Follow up with PCP  Return with new or worse symptoms.

## 2018-06-05 ENCOUNTER — HOME CARE VISIT (OUTPATIENT)
Dept: SCHEDULING | Facility: HOME HEALTH | Age: 68
End: 2018-06-05
Payer: MEDICARE

## 2018-06-05 VITALS — HEART RATE: 68 BPM | DIASTOLIC BLOOD PRESSURE: 62 MMHG | SYSTOLIC BLOOD PRESSURE: 98 MMHG | RESPIRATION RATE: 18 BRPM

## 2018-06-05 PROCEDURE — 400013 HH SOC

## 2018-06-05 PROCEDURE — 3331090001 HH PPS REVENUE CREDIT

## 2018-06-05 PROCEDURE — G0151 HHCP-SERV OF PT,EA 15 MIN: HCPCS

## 2018-06-05 PROCEDURE — 3331090002 HH PPS REVENUE DEBIT

## 2018-06-06 PROCEDURE — 3331090001 HH PPS REVENUE CREDIT

## 2018-06-06 PROCEDURE — 3331090002 HH PPS REVENUE DEBIT

## 2018-06-07 ENCOUNTER — HOME CARE VISIT (OUTPATIENT)
Dept: SCHEDULING | Facility: HOME HEALTH | Age: 68
End: 2018-06-07
Payer: MEDICARE

## 2018-06-07 PROCEDURE — 3331090001 HH PPS REVENUE CREDIT

## 2018-06-07 PROCEDURE — 3331090002 HH PPS REVENUE DEBIT

## 2018-06-07 PROCEDURE — G0151 HHCP-SERV OF PT,EA 15 MIN: HCPCS

## 2018-06-08 ENCOUNTER — HOME CARE VISIT (OUTPATIENT)
Dept: SCHEDULING | Facility: HOME HEALTH | Age: 68
End: 2018-06-08
Payer: MEDICARE

## 2018-06-08 VITALS
HEART RATE: 68 BPM | DIASTOLIC BLOOD PRESSURE: 76 MMHG | SYSTOLIC BLOOD PRESSURE: 112 MMHG | TEMPERATURE: 98.3 F | RESPIRATION RATE: 18 BRPM

## 2018-06-08 VITALS
TEMPERATURE: 98.3 F | DIASTOLIC BLOOD PRESSURE: 70 MMHG | RESPIRATION RATE: 16 BRPM | HEART RATE: 64 BPM | SYSTOLIC BLOOD PRESSURE: 110 MMHG

## 2018-06-08 PROCEDURE — G0152 HHCP-SERV OF OT,EA 15 MIN: HCPCS

## 2018-06-08 PROCEDURE — 3331090002 HH PPS REVENUE DEBIT

## 2018-06-08 PROCEDURE — 3331090001 HH PPS REVENUE CREDIT

## 2018-06-09 PROCEDURE — 3331090002 HH PPS REVENUE DEBIT

## 2018-06-09 PROCEDURE — 3331090001 HH PPS REVENUE CREDIT

## 2018-06-10 ENCOUNTER — HOME CARE VISIT (OUTPATIENT)
Dept: HOME HEALTH SERVICES | Facility: HOME HEALTH | Age: 68
End: 2018-06-10
Payer: MEDICARE

## 2018-06-10 VITALS — WEIGHT: 120 LBS | HEIGHT: 66 IN | BODY MASS INDEX: 19.29 KG/M2

## 2018-06-10 PROCEDURE — 3331090001 HH PPS REVENUE CREDIT

## 2018-06-10 PROCEDURE — 3331090002 HH PPS REVENUE DEBIT

## 2018-06-11 ENCOUNTER — HOME CARE VISIT (OUTPATIENT)
Dept: SCHEDULING | Facility: HOME HEALTH | Age: 68
End: 2018-06-11
Payer: MEDICARE

## 2018-06-11 VITALS
TEMPERATURE: 98.6 F | DIASTOLIC BLOOD PRESSURE: 70 MMHG | HEART RATE: 72 BPM | SYSTOLIC BLOOD PRESSURE: 116 MMHG | RESPIRATION RATE: 17 BRPM

## 2018-06-11 PROCEDURE — 3331090002 HH PPS REVENUE DEBIT

## 2018-06-11 PROCEDURE — 3331090001 HH PPS REVENUE CREDIT

## 2018-06-11 PROCEDURE — G0157 HHC PT ASSISTANT EA 15: HCPCS

## 2018-06-12 PROCEDURE — 3331090002 HH PPS REVENUE DEBIT

## 2018-06-12 PROCEDURE — 3331090001 HH PPS REVENUE CREDIT

## 2018-06-13 PROCEDURE — 3331090001 HH PPS REVENUE CREDIT

## 2018-06-13 PROCEDURE — 3331090002 HH PPS REVENUE DEBIT

## 2018-06-14 ENCOUNTER — HOME CARE VISIT (OUTPATIENT)
Dept: HOME HEALTH SERVICES | Facility: HOME HEALTH | Age: 68
End: 2018-06-14
Payer: MEDICARE

## 2018-06-14 ENCOUNTER — HOME CARE VISIT (OUTPATIENT)
Dept: SCHEDULING | Facility: HOME HEALTH | Age: 68
End: 2018-06-14
Payer: MEDICARE

## 2018-06-14 VITALS
HEART RATE: 76 BPM | RESPIRATION RATE: 17 BRPM | TEMPERATURE: 99 F | DIASTOLIC BLOOD PRESSURE: 62 MMHG | SYSTOLIC BLOOD PRESSURE: 118 MMHG

## 2018-06-14 PROCEDURE — G0157 HHC PT ASSISTANT EA 15: HCPCS

## 2018-06-14 PROCEDURE — 3331090001 HH PPS REVENUE CREDIT

## 2018-06-14 PROCEDURE — 3331090002 HH PPS REVENUE DEBIT

## 2018-06-15 ENCOUNTER — HOSPITAL ENCOUNTER (EMERGENCY)
Age: 68
Discharge: HOME | End: 2018-06-15
Attending: EMERGENCY MEDICINE
Payer: MEDICARE

## 2018-06-15 VITALS
DIASTOLIC BLOOD PRESSURE: 68 MMHG | RESPIRATION RATE: 16 BRPM | BODY MASS INDEX: 18.36 KG/M2 | HEART RATE: 66 BPM | TEMPERATURE: 99 F | HEIGHT: 67 IN | WEIGHT: 117 LBS | OXYGEN SATURATION: 99 % | SYSTOLIC BLOOD PRESSURE: 155 MMHG

## 2018-06-15 DIAGNOSIS — E87.6 HYPOKALEMIA: ICD-10-CM

## 2018-06-15 DIAGNOSIS — R55 FAINTING SPELL: ICD-10-CM

## 2018-06-15 DIAGNOSIS — R10.9 CHRONIC ABDOMINAL PAIN: Primary | ICD-10-CM

## 2018-06-15 DIAGNOSIS — G89.29 CHRONIC ABDOMINAL PAIN: Primary | ICD-10-CM

## 2018-06-15 LAB
ALBUMIN SERPL-MCNC: 3.6 G/DL (ref 3.2–4.6)
ALBUMIN/GLOB SERPL: 1.3 {RATIO} (ref 1.2–3.5)
ALP SERPL-CCNC: 68 U/L (ref 50–136)
ALT SERPL-CCNC: <6 U/L (ref 12–65)
ANION GAP SERPL CALC-SCNC: 10 MMOL/L (ref 7–16)
AST SERPL-CCNC: 15 U/L (ref 15–37)
BASOPHILS # BLD: 0 K/UL (ref 0–0.2)
BASOPHILS NFR BLD: 0 % (ref 0–2)
BILIRUB SERPL-MCNC: 0.7 MG/DL (ref 0.2–1.1)
BUN SERPL-MCNC: 8 MG/DL (ref 8–23)
CALCIUM SERPL-MCNC: 8.2 MG/DL (ref 8.3–10.4)
CHLORIDE SERPL-SCNC: 105 MMOL/L (ref 98–107)
CO2 SERPL-SCNC: 28 MMOL/L (ref 21–32)
CREAT SERPL-MCNC: 0.94 MG/DL (ref 0.6–1)
DIFFERENTIAL METHOD BLD: ABNORMAL
EOSINOPHIL # BLD: 0 K/UL (ref 0–0.8)
EOSINOPHIL NFR BLD: 0 % (ref 0.5–7.8)
ERYTHROCYTE [DISTWIDTH] IN BLOOD BY AUTOMATED COUNT: 14.4 % (ref 11.9–14.6)
GLOBULIN SER CALC-MCNC: 2.8 G/DL (ref 2.3–3.5)
GLUCOSE SERPL-MCNC: 93 MG/DL (ref 65–100)
HCT VFR BLD AUTO: 36.8 % (ref 35.8–46.3)
HGB BLD-MCNC: 11.9 G/DL (ref 11.7–15.4)
IMM GRANULOCYTES # BLD: 0 K/UL (ref 0–0.5)
IMM GRANULOCYTES NFR BLD AUTO: 1 % (ref 0–5)
LIPASE SERPL-CCNC: 181 U/L (ref 73–393)
LYMPHOCYTES # BLD: 0.7 K/UL (ref 0.5–4.6)
LYMPHOCYTES NFR BLD: 11 % (ref 13–44)
MCH RBC QN AUTO: 26.3 PG (ref 26.1–32.9)
MCHC RBC AUTO-ENTMCNC: 32.3 G/DL (ref 31.4–35)
MCV RBC AUTO: 81.2 FL (ref 79.6–97.8)
MONOCYTES # BLD: 0.6 K/UL (ref 0.1–1.3)
MONOCYTES NFR BLD: 9 % (ref 4–12)
NEUTS SEG # BLD: 5.2 K/UL (ref 1.7–8.2)
NEUTS SEG NFR BLD: 79 % (ref 43–78)
PLATELET # BLD AUTO: 234 K/UL (ref 150–450)
PMV BLD AUTO: 10.6 FL (ref 10.8–14.1)
POTASSIUM SERPL-SCNC: 3.2 MMOL/L (ref 3.5–5.1)
PROT SERPL-MCNC: 6.4 G/DL (ref 6.3–8.2)
RBC # BLD AUTO: 4.53 M/UL (ref 4.05–5.25)
SODIUM SERPL-SCNC: 143 MMOL/L (ref 136–145)
WBC # BLD AUTO: 6.6 K/UL (ref 4.3–11.1)

## 2018-06-15 PROCEDURE — 80053 COMPREHEN METABOLIC PANEL: CPT | Performed by: EMERGENCY MEDICINE

## 2018-06-15 PROCEDURE — 96374 THER/PROPH/DIAG INJ IV PUSH: CPT | Performed by: EMERGENCY MEDICINE

## 2018-06-15 PROCEDURE — 85025 COMPLETE CBC W/AUTO DIFF WBC: CPT | Performed by: EMERGENCY MEDICINE

## 2018-06-15 PROCEDURE — 83690 ASSAY OF LIPASE: CPT | Performed by: EMERGENCY MEDICINE

## 2018-06-15 PROCEDURE — 96361 HYDRATE IV INFUSION ADD-ON: CPT | Performed by: EMERGENCY MEDICINE

## 2018-06-15 PROCEDURE — 3331090002 HH PPS REVENUE DEBIT

## 2018-06-15 PROCEDURE — 74011250636 HC RX REV CODE- 250/636: Performed by: EMERGENCY MEDICINE

## 2018-06-15 PROCEDURE — 99284 EMERGENCY DEPT VISIT MOD MDM: CPT | Performed by: EMERGENCY MEDICINE

## 2018-06-15 PROCEDURE — 93005 ELECTROCARDIOGRAM TRACING: CPT | Performed by: EMERGENCY MEDICINE

## 2018-06-15 PROCEDURE — 3331090001 HH PPS REVENUE CREDIT

## 2018-06-15 RX ORDER — ONDANSETRON 2 MG/ML
4 INJECTION INTRAMUSCULAR; INTRAVENOUS
Status: COMPLETED | OUTPATIENT
Start: 2018-06-15 | End: 2018-06-15

## 2018-06-15 RX ORDER — SUCRALFATE 1 G/10ML
1 SUSPENSION ORAL
Status: DISCONTINUED | OUTPATIENT
Start: 2018-06-15 | End: 2018-06-15 | Stop reason: HOSPADM

## 2018-06-15 RX ORDER — POTASSIUM CHLORIDE 20 MEQ/1
20 TABLET, EXTENDED RELEASE ORAL
Status: DISCONTINUED | OUTPATIENT
Start: 2018-06-15 | End: 2018-06-15 | Stop reason: HOSPADM

## 2018-06-15 RX ADMIN — SODIUM CHLORIDE 1000 ML: 900 INJECTION, SOLUTION INTRAVENOUS at 11:49

## 2018-06-15 RX ADMIN — ONDANSETRON 4 MG: 2 INJECTION INTRAMUSCULAR; INTRAVENOUS at 12:04

## 2018-06-15 NOTE — PROGRESS NOTES
Visited with the patient. Explained inpatient and outpatient case management and notified her that Jen Nj RN, CCM was trying to get in touch with her and closed the case because she was not returning any phone calls. Patient states she is interested in outpatient case management. I have told patient that she needs to call Sophie Shen back if she leaves her a message. Patient verbalizes understanding. Call to [facility] and left message regarding patient.

## 2018-06-15 NOTE — DISCHARGE INSTRUCTIONS
Follow up with your primary care physician  Drink water  Stand up slowly  Continue your regular medications

## 2018-06-15 NOTE — ED PROVIDER NOTES
HPI Comments: Patient states she passed out in her kitchen today. She has had some abdominal pain for several months. Complains of constipation. Nausea at times. Last emesis 2 days ago. No fever. States she has no energy. Says she saw Dr. Alicia Santamaria this week and he stopped her BP medication because of her fainting. She recently had a bout of diverticulitis. She finished Augmentin. She has Parkinson's Disease and states she has had a chronic nonproductive cough since starting medications. Patient is a 76 y.o. female presenting with syncope. The history is provided by the patient and medical records. Syncope    This is a recurrent problem. The current episode started 3 to 5 hours ago. The problem has been resolved. She lost consciousness for a period of less than one minute. The problem is associated with standing up. Associated symptoms include malaise/fatigue, abdominal pain, nausea, vomiting and light-headedness. Pertinent negatives include no chest pain, no palpitations, no fever, no bowel incontinence, no bladder incontinence, no headaches, no focal weakness, no slurred speech, no anal bleeding and no head injury. She has tried nothing for the symptoms. Her past medical history is significant for syncope.         Past Medical History:   Diagnosis Date    Abnormal gait 7/5/2016    Acute serous otitis media of left ear     Anxiety     Bilateral carpal tunnel syndrome 7/5/2016    Chronic abdominal pain 7/5/2016    Dementia due to Parkinson's disease without behavioral disturbance ([address] 7/5/2016    Depression     Diverticulitis     Diverticulitis     Gastritis     GERD (gastroesophageal reflux disease)     Heart murmur     Hypercholesterolemia     Hypertension     daily meds, on norvasc prn- took one yesterday for BP of 152/68    Neuropathic pain 7/5/2016    Nontoxic multinodular goiter 7/5/2016    Orthostatic hypotension     Orthostatic hypotension     Parkinson's disease ([address]     dx 2014- sometimes requires walker or wheelchair when tired    [facility]'s [facility]     Peripheral neuropathy     Syncope 10/10/2015    Type 2 diabetes mellitus without complication 5/3/5811    \"They thought I was a diabetic\" - no meds- started checking glucose 2 yrs ago; last A1C=? ; has not checked since 5 mos ago (June 2016)    Urinary frequency 7/5/2016    Vaginal bleeding     Vomiting     Weight loss     Weight loss, unintentional     50 lbs over 2 yrs       Past Surgical History:   Procedure Laterality Date    HX APPENDECTOMY  1974    HX CHOLECYSTECTOMY  2001    HX TUBAL LIGATION  1974         Family History:   Problem Relation Age of Onset    Diabetes Mother     Alzheimer Mother     Diabetes Father     Cancer Father      prostate    Stroke Father     Diabetes Sister     Diabetes Brother     Cancer Brother      kidney and prostate    Sickle Cell Anemia Son      2 sons     Breast Cancer Neg Hx        Social History     Social History    Marital status:      Spouse name: N/A    Number of children: N/A    Years of education: N/A     Occupational History    Not on file. Social History Main Topics    Smoking status: Never Smoker    Smokeless tobacco: Never Used    Alcohol use No    Drug use: No    Sexual activity: Not on file     Other Topics Concern    Not on file     Social History Narrative         ALLERGIES: Flagyl [metronidazole]; Aspirin; Contrast agent [iodine]; Gabapentin; Milk containing products; Omnipaque rediflo [iohexol]; and Red dye    Review of Systems   Constitutional: Positive for fatigue and malaise/fatigue. Negative for appetite change, chills and fever. HENT: Negative. Eyes: Negative. Respiratory: Positive for cough. Negative for chest tightness and shortness of breath. Cardiovascular: Positive for syncope. Negative for chest pain, palpitations and leg swelling. Gastrointestinal: Positive for abdominal pain, constipation, nausea and vomiting. Negative for anal bleeding and bowel incontinence. Genitourinary: Negative. Negative for bladder incontinence. Musculoskeletal: Positive for myalgias. Skin: Positive for wound. Neurological: Positive for syncope, light-headedness and numbness. Negative for focal weakness and headaches. Hematological: Negative. Psychiatric/Behavioral: The patient is nervous/anxious. Vitals:    06/15/18 1102   BP: 134/71   Pulse: 60   Resp: 16   Temp: 99 °F (37.2 °C)   SpO2: 98%   Weight: 53.1 kg (117 lb)   Height: 5' 7\" (1.702 m)            Physical Exam   Constitutional: She is oriented to person, place, and time. She appears well-developed and well-nourished. thin   HENT:   Head: Normocephalic and atraumatic. Right Ear: External ear normal.   Left Ear: External ear normal.   Mouth/Throat: Oropharynx is clear and moist.   Eyes: Conjunctivae and EOM are normal. Pupils are equal, round, and reactive to light. No scleral icterus. Neck: Normal range of motion. Neck supple. No JVD present. Cardiovascular: Normal rate, regular rhythm, normal heart sounds and intact distal pulses. Pulmonary/Chest: Effort normal and breath sounds normal.   Abdominal: Soft. Bowel sounds are normal. She exhibits no mass. There is no tenderness. Musculoskeletal: Normal range of motion. She exhibits no edema or tenderness. Neurological: She is alert and oriented to person, place, and time. Skin: Skin is warm and dry. Psychiatric: She has a normal mood and affect. Her behavior is normal.   Nursing note and vitals reviewed.        MDM      ED Course       Procedures    Fainting spell  Abdominal pain - chronic  Hypokalemia - KCL 20 meq po  IVF's NS 1 liter  Zofran 4 mg IV  Carafate 1 gram po  EKG sinus bradycardia - no ischemia - she is on a beta blocker  Results and instructions discussed with patient  Follow up with PCP  Return with worse or new symptoms

## 2018-06-15 NOTE — ED NOTES
I have reviewed discharge instructions with the patient. The patient verbalized understanding. Patient left ED via Discharge Method: ambulatory to Home with self. Opportunity for questions and clarification provided. Patient given 0 scripts. To continue your aftercare when you leave the hospital, you may receive an automated call from our care team to check in on how you are doing. This is a free service and part of our promise to provide the best care and service to meet your aftercare needs.  If you have questions, or wish to unsubscribe from this service please call [phone]. Thank you for Choosing our [facility] Emergency Department.

## 2018-06-16 LAB
ATRIAL RATE: 53 BPM
CALCULATED P AXIS, ECG09: 55 DEGREES
CALCULATED R AXIS, ECG10: 58 DEGREES
CALCULATED T AXIS, ECG11: 43 DEGREES
DIAGNOSIS, 93000: NORMAL
P-R INTERVAL, ECG05: 144 MS
Q-T INTERVAL, ECG07: 416 MS
QRS DURATION, ECG06: 76 MS
QTC CALCULATION (BEZET), ECG08: 390 MS
VENTRICULAR RATE, ECG03: 53 BPM

## 2018-06-16 PROCEDURE — 3331090002 HH PPS REVENUE DEBIT

## 2018-06-16 PROCEDURE — 3331090001 HH PPS REVENUE CREDIT

## 2018-06-17 PROCEDURE — 3331090002 HH PPS REVENUE DEBIT

## 2018-06-17 PROCEDURE — 3331090001 HH PPS REVENUE CREDIT

## 2018-06-18 PROCEDURE — 3331090002 HH PPS REVENUE DEBIT

## 2018-06-18 PROCEDURE — 3331090001 HH PPS REVENUE CREDIT

## 2018-06-19 PROCEDURE — 3331090001 HH PPS REVENUE CREDIT

## 2018-06-19 PROCEDURE — 3331090002 HH PPS REVENUE DEBIT

## 2018-06-20 ENCOUNTER — HOME CARE VISIT (OUTPATIENT)
Dept: HOME HEALTH SERVICES | Facility: HOME HEALTH | Age: 68
End: 2018-06-20
Payer: MEDICARE

## 2018-06-20 ENCOUNTER — HOME CARE VISIT (OUTPATIENT)
Dept: SCHEDULING | Facility: HOME HEALTH | Age: 68
End: 2018-06-20
Payer: MEDICARE

## 2018-06-20 VITALS
RESPIRATION RATE: 17 BRPM | SYSTOLIC BLOOD PRESSURE: 110 MMHG | HEART RATE: 72 BPM | TEMPERATURE: 97.1 F | DIASTOLIC BLOOD PRESSURE: 62 MMHG

## 2018-06-20 PROCEDURE — G0157 HHC PT ASSISTANT EA 15: HCPCS

## 2018-06-20 PROCEDURE — 3331090001 HH PPS REVENUE CREDIT

## 2018-06-20 PROCEDURE — 3331090002 HH PPS REVENUE DEBIT

## 2018-06-21 ENCOUNTER — HOME CARE VISIT (OUTPATIENT)
Dept: HOME HEALTH SERVICES | Facility: HOME HEALTH | Age: 68
End: 2018-06-21
Payer: MEDICARE

## 2018-06-21 PROCEDURE — 3331090002 HH PPS REVENUE DEBIT

## 2018-06-21 PROCEDURE — 3331090001 HH PPS REVENUE CREDIT

## 2018-06-22 ENCOUNTER — HOME CARE VISIT (OUTPATIENT)
Dept: SCHEDULING | Facility: HOME HEALTH | Age: 68
End: 2018-06-22
Payer: MEDICARE

## 2018-06-22 VITALS
TEMPERATURE: 98.2 F | DIASTOLIC BLOOD PRESSURE: 78 MMHG | SYSTOLIC BLOOD PRESSURE: 130 MMHG | HEART RATE: 76 BPM | RESPIRATION RATE: 17 BRPM

## 2018-06-22 PROCEDURE — G0157 HHC PT ASSISTANT EA 15: HCPCS

## 2018-06-22 PROCEDURE — 3331090001 HH PPS REVENUE CREDIT

## 2018-06-22 PROCEDURE — 3331090002 HH PPS REVENUE DEBIT

## 2018-06-23 PROCEDURE — 3331090002 HH PPS REVENUE DEBIT

## 2018-06-23 PROCEDURE — 3331090001 HH PPS REVENUE CREDIT

## 2018-06-24 PROCEDURE — 3331090002 HH PPS REVENUE DEBIT

## 2018-06-24 PROCEDURE — 3331090001 HH PPS REVENUE CREDIT

## 2018-06-25 PROCEDURE — 3331090002 HH PPS REVENUE DEBIT

## 2018-06-25 PROCEDURE — 3331090001 HH PPS REVENUE CREDIT

## 2018-06-26 ENCOUNTER — HOME CARE VISIT (OUTPATIENT)
Dept: HOME HEALTH SERVICES | Facility: HOME HEALTH | Age: 68
End: 2018-06-26
Payer: MEDICARE

## 2018-06-26 ENCOUNTER — HOME CARE VISIT (OUTPATIENT)
Dept: SCHEDULING | Facility: HOME HEALTH | Age: 68
End: 2018-06-26
Payer: MEDICARE

## 2018-06-26 VITALS
DIASTOLIC BLOOD PRESSURE: 70 MMHG | SYSTOLIC BLOOD PRESSURE: 122 MMHG | HEART RATE: 76 BPM | RESPIRATION RATE: 18 BRPM | TEMPERATURE: 98.2 F

## 2018-06-26 PROCEDURE — 3331090001 HH PPS REVENUE CREDIT

## 2018-06-26 PROCEDURE — G0151 HHCP-SERV OF PT,EA 15 MIN: HCPCS

## 2018-06-26 PROCEDURE — 3331090002 HH PPS REVENUE DEBIT

## 2018-06-27 ENCOUNTER — HOME CARE VISIT (OUTPATIENT)
Dept: SCHEDULING | Facility: HOME HEALTH | Age: 68
End: 2018-06-27
Payer: MEDICARE

## 2018-06-27 VITALS
TEMPERATURE: 98.5 F | HEART RATE: 72 BPM | RESPIRATION RATE: 16 BRPM | DIASTOLIC BLOOD PRESSURE: 72 MMHG | SYSTOLIC BLOOD PRESSURE: 122 MMHG

## 2018-06-27 PROCEDURE — G0153 HHCP-SVS OF S/L PATH,EA 15MN: HCPCS

## 2018-08-04 ENCOUNTER — ANESTHESIA EVENT (OUTPATIENT)
Dept: ENDOSCOPY | Age: 68
End: 2018-08-04
Payer: MEDICARE

## 2018-08-06 ENCOUNTER — HOSPITAL ENCOUNTER (OUTPATIENT)
Age: 68
Setting detail: OUTPATIENT SURGERY
Discharge: HOME | End: 2018-08-06
Attending: INTERNAL MEDICINE | Admitting: INTERNAL MEDICINE
Payer: MEDICARE

## 2018-08-06 ENCOUNTER — ANESTHESIA (OUTPATIENT)
Dept: ENDOSCOPY | Age: 68
End: 2018-08-06
Payer: MEDICARE

## 2018-08-06 VITALS
DIASTOLIC BLOOD PRESSURE: 79 MMHG | OXYGEN SATURATION: 97 % | WEIGHT: 118 LBS | BODY MASS INDEX: 18.96 KG/M2 | SYSTOLIC BLOOD PRESSURE: 182 MMHG | RESPIRATION RATE: 16 BRPM | HEART RATE: 58 BPM | TEMPERATURE: 97 F | HEIGHT: 66 IN

## 2018-08-06 LAB — GLUCOSE BLD STRIP.AUTO-MCNC: 80 MG/DL (ref 65–100)

## 2018-08-06 PROCEDURE — 74011250636 HC RX REV CODE- 250/636: Performed by: ANESTHESIOLOGY

## 2018-08-06 PROCEDURE — 88305 TISSUE EXAM BY PATHOLOGIST: CPT

## 2018-08-06 PROCEDURE — 82962 GLUCOSE BLOOD TEST: CPT

## 2018-08-06 PROCEDURE — 77030009426 HC FCPS BIOP ENDOSC BSC -B: Performed by: INTERNAL MEDICINE

## 2018-08-06 PROCEDURE — 76040000026: Performed by: INTERNAL MEDICINE

## 2018-08-06 PROCEDURE — 74011250636 HC RX REV CODE- 250/636

## 2018-08-06 PROCEDURE — 76060000032 HC ANESTHESIA 0.5 TO 1 HR: Performed by: INTERNAL MEDICINE

## 2018-08-06 RX ORDER — PROPOFOL 10 MG/ML
INJECTION, EMULSION INTRAVENOUS AS NEEDED
Status: DISCONTINUED | OUTPATIENT
Start: 2018-08-06 | End: 2018-08-06 | Stop reason: HOSPADM

## 2018-08-06 RX ORDER — SODIUM CHLORIDE, SODIUM LACTATE, POTASSIUM CHLORIDE, CALCIUM CHLORIDE 600; 310; 30; 20 MG/100ML; MG/100ML; MG/100ML; MG/100ML
100 INJECTION, SOLUTION INTRAVENOUS CONTINUOUS
Status: DISCONTINUED | OUTPATIENT
Start: 2018-08-06 | End: 2018-08-06 | Stop reason: HOSPADM

## 2018-08-06 RX ORDER — ONDANSETRON 2 MG/ML
4 INJECTION INTRAMUSCULAR; INTRAVENOUS
Status: DISCONTINUED | OUTPATIENT
Start: 2018-08-06 | End: 2018-08-06 | Stop reason: HOSPADM

## 2018-08-06 RX ORDER — ONDANSETRON 2 MG/ML
INJECTION INTRAMUSCULAR; INTRAVENOUS AS NEEDED
Status: DISCONTINUED | OUTPATIENT
Start: 2018-08-06 | End: 2018-08-06 | Stop reason: HOSPADM

## 2018-08-06 RX ORDER — PROPOFOL 10 MG/ML
INJECTION, EMULSION INTRAVENOUS
Status: DISCONTINUED | OUTPATIENT
Start: 2018-08-06 | End: 2018-08-06 | Stop reason: HOSPADM

## 2018-08-06 RX ADMIN — PROPOFOL 100 MG: 10 INJECTION, EMULSION INTRAVENOUS at 12:01

## 2018-08-06 RX ADMIN — SODIUM CHLORIDE, SODIUM LACTATE, POTASSIUM CHLORIDE, AND CALCIUM CHLORIDE 100 ML/HR: 600; 310; 30; 20 INJECTION, SOLUTION INTRAVENOUS at 11:34

## 2018-08-06 RX ADMIN — PROPOFOL 200 MCG/KG/MIN: 10 INJECTION, EMULSION INTRAVENOUS at 12:01

## 2018-08-06 RX ADMIN — ONDANSETRON 4 MG: 2 INJECTION INTRAMUSCULAR; INTRAVENOUS at 11:56

## 2018-08-06 NOTE — PROGRESS NOTES
Pt c/o nausea, Dr. Ildefonso Ny informed, orders received to give 4mg of Zofran IV. Anahi Huston stated he would administer this in procedure.

## 2018-08-06 NOTE — ANESTHESIA POSTPROCEDURE EVALUATION
Post-Anesthesia Evaluation and Assessment    Patient: Becca Soto MRN: [id number]  SSN: [id number]    YOB: 1950  Age: 76 y.o. Sex: female       Cardiovascular Function/Vital Signs  Visit Vitals    /73    Pulse 62    Temp 36.1 °C (97 °F)    Resp 12    Ht 5' 6\" (1.676 m)    Wt 53.5 kg (118 lb)    SpO2 100%    BMI 19.05 kg/m2       Patient is status post total IV anesthesia anesthesia for Procedure(s):  ESOPHAGOGASTRODUODENOSCOPY (EGD)  COLONOSCOPY/ 19  COLON BIOPSY. Nausea/Vomiting: None    Postoperative hydration reviewed and adequate. Pain:  Pain Scale 1: Numeric (0 - 10) (08/06/18 1103)  Pain Intensity 1: 0 (08/06/18 1103)   Managed    Neurological Status: At baseline    Mental Status and Level of Consciousness: Arousable    Pulmonary Status:   O2 Device: Nasal cannula (08/06/18 1240)   Adequate oxygenation and airway patent    Complications related to anesthesia: None    Post-anesthesia assessment completed.  No concerns    Signed By: Angel Matos MD     August 6, 2018

## 2018-08-06 NOTE — H&P
[facility] Pre Op H and P          Chief Complaint:  Burning in stomach, history of diverticulitis     Subjective:     History of Present Illness:  Patient is a 76 y.o. Woman with chronic abdominal pain, found to have diverticular abscess and possible mild diverticulitis, presents for outpatient EGD and Colonoscopy. PMH:  Past Medical History:   Diagnosis Date    Abnormal gait 7/5/2016    Acute serous otitis media of left ear     Anxiety     Bilateral carpal tunnel syndrome 7/5/2016    Chronic abdominal pain 7/5/2016    Dementia due to Parkinson's disease without behavioral disturbance ([address] 7/5/2016    Depression     managed by meds    Diverticulitis     Gastritis     GERD (gastroesophageal reflux disease)     Heart murmur     Hypercholesterolemia     Hypertension     daily meds, on norvasc prn- took one yesterday for BP of 152/68    Neuropathic pain 7/5/2016    Nontoxic multinodular goiter 7/5/2016    Orthostatic hypotension     Parkinson's disease ([facility] [address]     dx 2014- sometimes requires walker or wheelchair when tired    Peripheral neuropathy     Syncope 10/10/2015    Type 2 diabetes mellitus without complication 8/5/7270    \"They thought I was a diabetic\" - no meds- started checking glucose 2 yrs ago; last A1C=? ; has not checked since 5 mos ago (June 2016)    Urinary frequency 7/5/2016    Vaginal bleeding     Weight loss, unintentional     50 lbs over 2 yrs       PSH:  Past Surgical History:   Procedure Laterality Date    HX APPENDECTOMY  1974    HX CHOLECYSTECTOMY  2001    HX TUBAL LIGATION  1974       Allergies: Allergies   Allergen Reactions    Flagyl [Metronidazole] Other (comments)     Upset her stomach, nausea    Aspirin Nausea and Vomiting    Contrast Agent [Iodine] Nausea and Vomiting    Gabapentin Nausea Only    Milk Containing Products Other (comments)    Morphine Drowsiness    Omnipaque Rediflo [Iohexol] Other (comments)     Stomach upset.     Red Dye Other (comments)     Upset stomach. Home Medications:  Prior to Admission medications    Medication Sig Start Date End Date Taking? Authorizing Provider   midodrine (PROAMITINE) 2.5 mg tablet Take  by mouth daily. Indications: Symptomatic Orthostatic Hypotension   Yes Historical Provider   ALPRAZolam (XANAX) 0.5 mg tablet Take 0.5 mg by mouth two (2) times a day. 6/30/41  Yes Toñito Bingham MD   nystatin (MYCOSTATIN) 100,000 unit/mL suspension Take 500,000 Units by mouth four (4) times daily. 5/11/18  Yes Genet Haile MD   clotrimazole [facility]) 10 mg jeremi Take 10 mg by mouth four (4) times daily. Yes Paulette Dodge MD   loratadine (CLARITIN) 10 mg tablet Take 10 mg by mouth. Yes Paulette Dodge MD   ergocalciferol (VITAMIN D2) 50,000 unit capsule Take 50,000 Units by mouth. Yes Paulette Dodge MD   docusate sodium (COLACE) 50 mg capsule Take 50 mg by mouth two (2) times a day. Yes Paulette Dodge MD   MEGESTROL ACETATE (MEGACE PO) Take  by mouth. Yes Paulette Dodge MD   methIMAzole (TAPAZOLE) 10 mg tablet Take 10 mg by mouth daily. 9/17/17  Yes Historical Provider   ondansetron hcl (ZOFRAN) 4 mg tablet Take 1 Tab by mouth three (3) times daily as needed for Nausea. 10/27/17  Yes Tati Wu MD   carbidopa-levodopa (PARCOPA)  mg rapid dissolve tablet 2 tabs q3h for 5 doses and 1 tab qhs 12/8/16  Yes Jennifer MATA MD   polyethylene glycol (MIRALAX) 17 gram packet Take 17 g by mouth as needed. Indications: Constipation   Yes Historical Provider   esomeprazole (NEXIUM) 40 mg capsule Take 40 mg by mouth two (2) times a day.    Yes Historical Provider       Hospital Medications:  Current Facility-Administered Medications   Medication Dose Route Frequency    lactated Ringers infusion  100 mL/hr IntraVENous CONTINUOUS       Social History:  Social History   Substance Use Topics    Smoking status: Never Smoker    Smokeless tobacco: Never Used    Alcohol use No     Pt denies any history of IV drug use, blood transfusions, tattoos     Family History:  Family History   Problem Relation Age of Onset    Diabetes Mother     Alzheimer Mother     Diabetes Father     Cancer Father      prostate    Stroke Father     Diabetes Sister     Diabetes Brother     Cancer Brother      kidney and prostate    Sickle Cell Anemia Son      2 sons     Breast Cancer Neg Hx        Review of Systems:  A detailed 10 system ROS is obtained, with pertinent positives as listed above. All others are negative. Diet:      Objective:     Physical Exam:  Vitals:  Visit Vitals    /53    Pulse (!) 55    Temp 98.1 °F (36.7 °C)    Resp 14    Ht 5' 6\" (1.676 m)    Wt 53.5 kg (118 lb)    SpO2 99%    BMI 19.05 kg/m2     Gen:  Pt is alert, cooperative, no acute distress  Skin:  Extremities and face reveal no rashes. HEENT: Sclerae anicteric. Extra-occular muscles are intact. No oral ulcers. The neck is supple. Cardiovascular: Regular rate and rhythm. No murmurs, gallops, or rubs. Respiratory:  Comfortable breathing with no accessory muscle use. Clear breath sounds anteriorly with no wheezes, rales, or rhonchi. GI:  Abdomen nondistended, soft, and nontender. Normal active bowel sounds. No masses palpable. Musculoskeletal:  Extremities have good range of motion. No costovertebral tenderness. Neurological:  Gross memory appears intact. Patient is alert and oriented. Psychiatric:  Mood appears appropriate with judgement intact. Lymphatic:  No cervical or supraclavicular adenopathy. Assessment:       Active Problems:    * No active hospital problems. *      Plan:       EGD and Colonoscopy as planned.  ASA III

## 2018-08-06 NOTE — OP NOTES
Endoscopic Gastroduodenoscopy Procedure Note    Indications: chronic burning type-pain in the upper abdomen and chest    Anesthesia/Sedation: MAC IV          Procedure Details     Informed consent was obtained for the procedure, including conscious sedation. Risks of infection, perforation, hemorrhage, adverse drug reaction and aspiration were discussed. The patient was placed in the left lateral decubitus position. She was monitored continuously with ECG tracing, pulse oximetry, blood pressure monitoring, and direct observation. The [id number] gastroscope was inserted into the mouth and advanced under direct vision to the second portion of the duodenum. A careful inspection was made as the gastroscope was withdrawn, including a retroflexed view of the proximal stomach; findings and interventions are described below. Appropriate photodocumentation was obtained. Findings:   ESOPHAGUS: The esophagus is normal. The proximal, mid, and distal portions are normal. The Z-Line is intact. STOMACH: The fundus on antegrade and retroflex views is normal. The body, antrum, and pylorus are normal.   DUODENUM: The bulb and second portions are normal.      Specimens: none    Estimated Blood Loss: None           Complications:   None; patient tolerated the procedure well. Attending Attestation:  I performed the procedure. Impression:    Normal upper endoscopy, with no endoscopic evidence of neoplasia or mucosal abnormality. Recommendations:  1. Follow up with primary care physician  2.  Proceed to colonoscopy

## 2018-08-06 NOTE — DISCHARGE INSTRUCTIONS
Gastrointestinal Esophagogastroduodenoscopy (EGD) - Upper Exam Discharge Instructions    1. Call Dr. Pastor Staley at [phone] for any problems or questions. 2. Contact the doctor's office for follow up appointment as directed. 3. Medication may cause drowsiness for several hours, therefore, do not drive or  operate machinery for remainder of the day. 4. No alcohol today. 5. Ordinarily, you may resume regular diet and activity after exam unless otherwise specified by your physician. 6. For mild soreness in your throat you may use Cepacol throat lozenges or warm salt-water gargles as needed. Any additional instructions: Instructions given to Liana Renee and other family members. Instructions given by:  DR BELLAMY    Gastrointestinal Colonoscopy/Flexible Sigmoidoscopy - Lower Exam Discharge Instructions  1. Call Dr. Pastor Staley at [phone] for any problems or questions. 2. Contact the doctors office for follow up appointment as directed  3. Medication may cause drowsiness for several hours, therefore, do not drive or operate machinery for remainder of the day. 4. No alcohol today. 5. Ordinarily, you may resume regular diet and activity after exam unless otherwise specified by your physician. 6. Because of air put into your colon during exam, you may experience some abdominal distension, relieved by the passage of gas, for several hours. 7. Contact your physician if you have any of the following:  a. Excessive amount of bleeding - large amount when having a bowel movement. Occasional specks of blood with bowel movement would not be unusual.  b. Severe abdominal pain  c. Fever or Chills  8. Polyp Removal - follow these additional instructions  a. No  Advil, Aleve, Nuprin, Ibuprofen, or medications that contain these drugs for 2 weeks. Any additional instructions: FOLLOW UP WITH DR. JOHNSON FOR POTENTIAL SURGERY, GET CT SCAN OF ABDOMEN AND PELVIS (OFFICE WILL CALL TO SCHEDULE) AND TAKE NEW ANTIBIOTIC PRESCRIPTION       Instructions given to Teri Tracey and other family members.   Instructions given by:  Radha Rosado RN

## 2018-08-06 NOTE — OP NOTES
COLONOSCOPY    DATE of PROCEDURE: 8/6/2018    MEDICATION:  MAC      INDICATIONS: 76year old woman with history of complicated diverticulitis, s/p long term antibiotics, now with chronic abdominal pain. INSTRUMENT: [id number]    PROCEDURE: After obtaining informed consent, the patient was placed in the left lateral position and sedated. The endoscope was advanced to the cecum where the appendiceal orifice and ileocecal valve were identified. On withdrawal, the colon was carefully inspected. Retroflexion was performed in the rectum. The patient was taken to the recovery area in stable condition. FINDINGS:  Prep quality was fair in the sigmoid, poor in the transverse and right colon. Diverticulosis present. The sigmoid colon was gently negotiated. Severe diverticulosis was present. The mucosa appeared hyperplastic, with erythema, but no evidence of inflammatory bowel disease, ischemic colitis, or neoplasm. The sigmoid colon was fixed in the pelvis. White pus exudate was present at 15 cm. Biopsies were taken. Estimated blood loss: 0-minimal         IMPRESSION:  1. Chronic diverticulitis     PLAN:  1. Get repeat CT abdomen/pelvis with contrast   2. Restart antibiotics  3. May try mesalamine   4.  Follow up with surgery pending Mabel Lakhani MD  [facility], [place]

## 2018-08-06 NOTE — ANESTHESIA PREPROCEDURE EVALUATION
Anesthetic History     PONV          Review of Systems / Medical History  Patient summary reviewed and pertinent labs reviewed    Pulmonary  Within defined limits                 Neuro/Psych         Psychiatric history (Depression) and dementia    Comments: Parkinson's Cardiovascular    Hypertension: well controlled          Hyperlipidemia    Exercise tolerance: >4 METS  Comments: Orthostatic hypotension  Pre syncopal episodes  Nml EF   GI/Hepatic/Renal     GERD: well controlled           Endo/Other    Diabetes: well controlled, type 2  Hypothyroidism: well controlled       Other Findings   Comments: Peripheral neuropathy  Wt. loss           Physical Exam    Airway  Mallampati: II  TM Distance: 4 - 6 cm  Neck ROM: normal range of motion   Mouth opening: Normal     Cardiovascular    Rhythm: regular  Rate: normal         Dental    Dentition: Caps/crowns     Pulmonary  Breath sounds clear to auscultation               Abdominal  GI exam deferred       Other Findings            Anesthetic Plan    ASA: 3  Anesthesia type: total IV anesthesia          Induction: Intravenous  Anesthetic plan and risks discussed with: Patient

## 2018-08-06 NOTE — ROUTINE PROCESS
PT DISCHARGED HOME VIA WHEELCHAIR WITH CAREGIVER. DISCHARGE INSTRUCTIONS GIVEN. VITALS STABLE AT TIME OF DISCHARGE.

## 2018-08-14 ENCOUNTER — HOSPITAL ENCOUNTER (OUTPATIENT)
Dept: CT IMAGING | Age: 68
Discharge: HOME | End: 2018-08-14
Attending: INTERNAL MEDICINE
Payer: MEDICARE

## 2018-08-14 DIAGNOSIS — K57.92 DIVERTICULITIS OF INTESTINE: ICD-10-CM

## 2018-08-14 DIAGNOSIS — R10.9 ABDOMINAL PAIN: ICD-10-CM

## 2018-08-14 LAB — CREAT BLD-MCNC: 1.1 MG/DL (ref 0.8–1.5)

## 2018-08-14 PROCEDURE — 74011636320 HC RX REV CODE- 636/320: Performed by: INTERNAL MEDICINE

## 2018-08-14 PROCEDURE — 82565 ASSAY OF CREATININE: CPT

## 2018-08-14 PROCEDURE — 74011000258 HC RX REV CODE- 258: Performed by: INTERNAL MEDICINE

## 2018-08-14 PROCEDURE — 74177 CT ABD & PELVIS W/CONTRAST: CPT

## 2018-08-14 RX ORDER — SODIUM CHLORIDE 0.9 % (FLUSH) 0.9 %
10 SYRINGE (ML) INJECTION
Status: COMPLETED | OUTPATIENT
Start: 2018-08-14 | End: 2018-08-14

## 2018-08-14 RX ADMIN — SODIUM CHLORIDE 100 ML: 900 INJECTION, SOLUTION INTRAVENOUS at 10:54

## 2018-08-14 RX ADMIN — IOPAMIDOL 100 ML: 755 INJECTION, SOLUTION INTRAVENOUS at 10:54

## 2018-08-14 RX ADMIN — Medication 10 ML: at 10:54

## 2018-08-14 RX ADMIN — DIATRIZOATE MEGLUMINE AND DIATRIZOATE SODIUM 15 ML: 660; 100 LIQUID ORAL; RECTAL at 10:54

## 2018-08-20 ENCOUNTER — HOSPITAL ENCOUNTER (EMERGENCY)
Age: 68
Discharge: HOME | End: 2018-08-21
Attending: EMERGENCY MEDICINE
Payer: MEDICARE

## 2018-08-20 DIAGNOSIS — G20 PARKINSON DISEASE (HCC): ICD-10-CM

## 2018-08-20 DIAGNOSIS — R10.84 ABDOMINAL PAIN, CHRONIC, GENERALIZED: Primary | ICD-10-CM

## 2018-08-20 DIAGNOSIS — G89.29 ABDOMINAL PAIN, CHRONIC, GENERALIZED: Primary | ICD-10-CM

## 2018-08-20 DIAGNOSIS — E11.21 TYPE 2 DIABETES MELLITUS WITH NEPHROPATHY (HCC): ICD-10-CM

## 2018-08-20 LAB
ALBUMIN SERPL-MCNC: 3.8 G/DL (ref 3.2–4.6)
ALBUMIN/GLOB SERPL: 1 {RATIO} (ref 1.2–3.5)
ALP SERPL-CCNC: 74 U/L (ref 50–136)
ALT SERPL-CCNC: 8 U/L (ref 12–65)
ANION GAP SERPL CALC-SCNC: 9 MMOL/L (ref 7–16)
AST SERPL-CCNC: 22 U/L (ref 15–37)
BASOPHILS # BLD: 0 K/UL
BASOPHILS NFR BLD: 0 % (ref 0–2)
BILIRUB SERPL-MCNC: 0.5 MG/DL (ref 0.2–1.1)
BUN SERPL-MCNC: 9 MG/DL (ref 8–23)
CALCIUM SERPL-MCNC: 8.8 MG/DL (ref 8.3–10.4)
CHLORIDE SERPL-SCNC: 102 MMOL/L (ref 98–107)
CO2 SERPL-SCNC: 28 MMOL/L (ref 21–32)
CREAT SERPL-MCNC: 0.94 MG/DL (ref 0.6–1)
DIFFERENTIAL METHOD BLD: ABNORMAL
EOSINOPHIL # BLD: 0 K/UL
EOSINOPHIL NFR BLD: 0 % (ref 0.5–7.8)
ERYTHROCYTE [DISTWIDTH] IN BLOOD BY AUTOMATED COUNT: 14.6 %
GLOBULIN SER CALC-MCNC: 3.7 G/DL (ref 2.3–3.5)
GLUCOSE SERPL-MCNC: 114 MG/DL (ref 65–100)
HCT VFR BLD AUTO: 36.9 % (ref 35.8–46.3)
HGB BLD-MCNC: 11.6 G/DL (ref 11.7–15.4)
IMM GRANULOCYTES # BLD: 0.1 K/UL
IMM GRANULOCYTES NFR BLD AUTO: 1 % (ref 0–5)
LIPASE SERPL-CCNC: 142 U/L (ref 73–393)
LYMPHOCYTES # BLD: 0.6 K/UL
LYMPHOCYTES NFR BLD: 7 % (ref 13–44)
MCH RBC QN AUTO: 25.4 PG (ref 26.1–32.9)
MCHC RBC AUTO-ENTMCNC: 31.4 G/DL (ref 31.4–35)
MCV RBC AUTO: 80.9 FL (ref 79.6–97.8)
MONOCYTES # BLD: 0.4 K/UL
MONOCYTES NFR BLD: 5 % (ref 4–12)
NEUTS SEG # BLD: 7.4 K/UL
NEUTS SEG NFR BLD: 86 % (ref 43–78)
NRBC # BLD: 0 K/UL (ref 0–0.2)
PLATELET # BLD AUTO: 303 K/UL (ref 150–450)
PMV BLD AUTO: 9.9 FL (ref 9.4–12.3)
POTASSIUM SERPL-SCNC: 3.6 MMOL/L (ref 3.5–5.1)
PROT SERPL-MCNC: 7.5 G/DL (ref 6.3–8.2)
RBC # BLD AUTO: 4.56 M/UL (ref 4.05–5.2)
SODIUM SERPL-SCNC: 139 MMOL/L (ref 136–145)
WBC # BLD AUTO: 8.5 K/UL (ref 4.3–11.1)

## 2018-08-20 PROCEDURE — 81003 URINALYSIS AUTO W/O SCOPE: CPT | Performed by: EMERGENCY MEDICINE

## 2018-08-20 PROCEDURE — 85025 COMPLETE CBC W/AUTO DIFF WBC: CPT

## 2018-08-20 PROCEDURE — 80053 COMPREHEN METABOLIC PANEL: CPT

## 2018-08-20 PROCEDURE — 99284 EMERGENCY DEPT VISIT MOD MDM: CPT | Performed by: EMERGENCY MEDICINE

## 2018-08-20 PROCEDURE — 83690 ASSAY OF LIPASE: CPT

## 2018-08-20 RX ORDER — ONDANSETRON 2 MG/ML
4 INJECTION INTRAMUSCULAR; INTRAVENOUS
Status: COMPLETED | OUTPATIENT
Start: 2018-08-20 | End: 2018-08-21

## 2018-08-21 VITALS
BODY MASS INDEX: 18.52 KG/M2 | RESPIRATION RATE: 18 BRPM | WEIGHT: 118 LBS | OXYGEN SATURATION: 98 % | SYSTOLIC BLOOD PRESSURE: 148 MMHG | HEART RATE: 88 BPM | HEIGHT: 67 IN | DIASTOLIC BLOOD PRESSURE: 72 MMHG | TEMPERATURE: 98.2 F

## 2018-08-21 PROCEDURE — 96374 THER/PROPH/DIAG INJ IV PUSH: CPT | Performed by: EMERGENCY MEDICINE

## 2018-08-21 PROCEDURE — 74011250636 HC RX REV CODE- 250/636: Performed by: EMERGENCY MEDICINE

## 2018-08-21 RX ADMIN — ONDANSETRON 4 MG: 2 INJECTION, SOLUTION INTRAMUSCULAR; INTRAVENOUS at 00:22

## 2018-08-21 RX ADMIN — SODIUM CHLORIDE 1000 ML: 900 INJECTION, SOLUTION INTRAVENOUS at 00:22

## 2018-08-21 NOTE — ED TRIAGE NOTES
Pt reports abdominal pain, nausea and diarrhea. Pt reports history of diverticulitis.  Pt brought to ed via EMS>    Primitivo Diallo RN

## 2018-08-21 NOTE — DISCHARGE INSTRUCTIONS

## 2018-08-21 NOTE — ED NOTES
I have reviewed discharge instructions with the patient. The patient verbalized understanding. Patient left ED via Discharge Method: ambulatory to Home with self). Opportunity for questions and clarification provided. Patient given 0 scripts. To continue your aftercare when you leave the hospital, you may receive an automated call from our care team to check in on how you are doing. This is a free service and part of our promise to provide the best care and service to meet your aftercare needs.  If you have questions, or wish to unsubscribe from this service please call [phone]. Thank you for Choosing our [facility] Department.

## 2018-08-21 NOTE — ED PROVIDER NOTES
HPI Comments: Patient states she has been having abdominal pain with intermittent vomiting since September. She has been seen by GI and has had an extensive workup including upper and lower GI study recently. She does not know the results of those studies. She states her pain is diffuse constant achy pain worse when she eats. She has had vomiting as well. She takes Zofran at home without any significant improvement. She denies any fever. Elements of this note were created using speech recognition software. As such, errors of speech recognition may be present. Patient is a 76 y.o. female presenting with abdominal pain. The history is provided by the patient. Abdominal Pain    Associated symptoms include nausea and vomiting. Pertinent negatives include no fever.         Past Medical History:   Diagnosis Date    Abnormal gait 7/5/2016    Acute serous otitis media of left ear     Anxiety     Bilateral carpal tunnel syndrome 7/5/2016    Chronic abdominal pain 7/5/2016    Dementia due to Parkinson's disease without behavioral disturbance ([address] 7/5/2016    Depression     managed by meds    Diverticulitis     Gastritis     GERD (gastroesophageal reflux disease)     Heart murmur     Hypercholesterolemia     Hypertension     daily meds, on norvasc prn- took one yesterday for BP of 152/68    Neuropathic pain 7/5/2016    Nontoxic multinodular goiter 7/5/2016    Orthostatic hypotension     Parkinson's disease ([address]     dx 2014- sometimes requires walker or wheelchair when tired    Peripheral neuropathy     Syncope 10/10/2015    Type 2 diabetes mellitus without complication 1/0/9717    \"They thought I was a diabetic\" - no meds- started checking glucose 2 yrs ago; last A1C=? ; has not checked since 5 mos ago (June 2016)    Urinary frequency 7/5/2016    Vaginal bleeding     Weight loss, unintentional     50 lbs over 2 yrs       Past Surgical History:   Procedure Laterality Date    COLONOSCOPY N/A 8/6/2018    COLONOSCOPY/ 23 performed by Rangel [facility]dugo [facility] ENDOSCOPY    HX APPENDECTOMY  1974    HX CHOLECYSTECTOMY  2001    HX TUBAL LIGATION  1974         Family History:   Problem Relation Age of Onset    Diabetes Mother     Alzheimer Mother     Diabetes Father     Cancer Father      prostate    Stroke Father     Diabetes Sister     Diabetes Brother     Cancer Brother      kidney and prostate    Sickle Cell Anemia Son      2 sons     Breast Cancer Neg Hx        Social History     Social History    Marital status:      Spouse name: N/A    Number of children: N/A    Years of education: N/A     Occupational History    Not on file. Social History Main Topics    Smoking status: Never Smoker    Smokeless tobacco: Never Used    Alcohol use No    Drug use: No    Sexual activity: Not on file     Other Topics Concern    Not on file     Social History Narrative         ALLERGIES: Flagyl [metronidazole]; Aspirin; Contrast agent [iodine]; Gabapentin; Milk containing products; Morphine; Omnipaque rediflo [iohexol]; and Red dye    Review of Systems   Constitutional: Negative for chills and fever. Gastrointestinal: Positive for abdominal pain, nausea and vomiting. All other systems reviewed and are negative. Vitals:    08/20/18 2131   BP: 155/76   Pulse: (!) 110   Resp: 16   Temp: 98 °F (36.7 °C)   SpO2: 98%   Weight: 53.5 kg (118 lb)   Height: 5' 7\" (1.702 m)            Physical Exam   Constitutional: She is oriented to person, place, and time. She appears well-developed and well-nourished. HENT:   Head: Normocephalic and atraumatic. Eyes: Conjunctivae are normal. Pupils are equal, round, and reactive to light. Neck: Normal range of motion. Neck supple. Cardiovascular: Normal rate and regular rhythm. Pulmonary/Chest: Effort normal and breath sounds normal.   Abdominal: Soft. Bowel sounds are normal. She exhibits no distension. There is no tenderness.  There is no rebound. Musculoskeletal: She exhibits no edema or tenderness. Neurological: She is alert and oriented to person, place, and time. Skin: Skin is warm and dry. Psychiatric:   Flat affect   Nursing note and vitals reviewed. MDM  Number of Diagnoses or Management Options  Abdominal pain, chronic, generalized:   Parkinson disease ([facility] [address]): Type 2 diabetes mellitus with nephropathy [facility]):   Diagnosis management comments: 12:53 AM discussed results with the patient, unremarkable labs. Her CT scan from 6 days ago was unremarkable other than some colitis. Clinically she appears well, her abdomen is soft.  She has a primary doctor she can see, states that she saw her doctor yesterday       Amount and/or Complexity of Data Reviewed  Clinical lab tests: ordered and reviewed  Decide to obtain previous medical records or to obtain history from someone other than the patient: yes  Review and summarize past medical records: yes    Risk of Complications, Morbidity, and/or Mortality  Presenting problems: moderate  Diagnostic procedures: moderate  Management options: moderate          ED Course       Procedures

## 2018-08-31 ENCOUNTER — HOSPITAL ENCOUNTER (OUTPATIENT)
Dept: SURGERY | Age: 68
Discharge: HOME | End: 2018-08-31
Payer: MEDICARE

## 2018-08-31 VITALS
SYSTOLIC BLOOD PRESSURE: 164 MMHG | RESPIRATION RATE: 18 BRPM | WEIGHT: 119 LBS | DIASTOLIC BLOOD PRESSURE: 85 MMHG | TEMPERATURE: 98 F | OXYGEN SATURATION: 97 % | HEIGHT: 67 IN | BODY MASS INDEX: 18.68 KG/M2 | HEART RATE: 87 BPM

## 2018-08-31 LAB — GLUCOSE BLD STRIP.AUTO-MCNC: 93 MG/DL (ref 65–100)

## 2018-08-31 PROCEDURE — 82962 GLUCOSE BLOOD TEST: CPT

## 2018-08-31 RX ORDER — FAMOTIDINE 40 MG/1
40 TABLET, FILM COATED ORAL
COMMUNITY
End: 2019-04-12

## 2018-08-31 NOTE — PERIOP NOTES
Patient verified name, , and surgery as listed in [facility]. Patient provided medical/health information and PTA medications to the best of their ability. TYPE  CASE:3  Orders per surgeon: Received and dated 18. Labs per surgeon:per rosalina. Labs per anesthesia protocol: see [facility] results from 18. EKG  : On pts chart    Patient provided with and instructed on education handouts including Guide to Surgery, blood transfusions, pain management, and hand hygiene for the family and community, and [facility] brochure.     hibiclens and instructions given per hospital policy. Instructed patient to continue previous medications as prescribed prior to surgery unless otherwise directed and to take the following medications the day of surgery according to anesthesia guidelines : carbiopa-levodopa,lyrica, nexium,methimazole,carafate, claritin, zofran if needed . Instructed patient to hold  the following medications: all vitamins supplements and nsaids. Original medication prescription bottles not  visualized during patient appointment. Patient teach back successful and patient demonstrates knowledge of instruction. Dr. Archana Santos notifed of pts surgery time and pre-existing conditions. Dr. Archana Santos stated pt would be seen day of surgery.  Dr. Beverly Golden confirmed with ERAS coordinator maxx that pt was not a ERAS pt

## 2018-09-04 ENCOUNTER — ANESTHESIA EVENT (OUTPATIENT)
Dept: SURGERY | Age: 68
DRG: 330 | End: 2018-09-04
Payer: MEDICARE

## 2018-09-04 NOTE — H&P
[facility]  [address] [place] [address], [address] [place] [address]  [phone]    H&P Note   Ulysses Dominguez   MRN: [id number]     : 1950        HPI: Ulysses Dominguez is a 76 y.o. female who returns following EGD/colonoscopy by Dr. Kalpana Michel in early August and a follow-up CT scan performed on . I reviewed those reports and images from the CT scan. She has continued to have recurrent bouts of abdominal pain throughout this time. She has had multiple visits to the emergency room. She has been placed on antibiotics multiple times. It is apparent that she has chronic recurrent sigmoid diverticulitis. There may also be other things going on with her colon. The CT did show some colitis in the ascending colon. Her complaints are primarily left lower quadrant. She does have some generalized complaints as well. She was placed on Megace which seems to be increasing her appetite. She was present with a caregiver today who says that she will actually eat through the night. She was also placed on mesalamine but she is having difficulty swallowing that pill. She is currently afebrile. She reports having a bowel movement once every 2 weeks. The caregiver corrected her since said that she is actually having fairly regular bowel movements almost daily this week. She is taking MiraLAX. She is still complaining of hard stools and may need to adjust that dosing. I had seen her in 2018 in the office for 4 week follow-up of her complicated diverticulitis. I also saw her in 2017. A few days after that visit she went to the emergency department for continued complaints of abdominal pain. A follow-up CT scan was performed which shows complete resolution of her diverticulitis and abscess cavity. Her all of her blood work was normal.  She was supposed to have an appointment with Dr. Kalpana Michel last week but this was canceled due to the snowy conditions.   She will set up a repeat appointment with him. She still has many aches and pains. These appear to be chronic. I reviewed her new CT scan and attached the corresponding image from prior image with abscess. This shows resolution and only chronic other findings. On last visit in December 2017, she comes in not feeling well. She complains of loss of appetite, nausea, abdominal and chest pain. She was seen by Dr. Miguel Frank within the past 2 days. I was able to speak with him as well. He ordered blood work which was all without abnormality. He did order a repeat CT scan which will be performed next week. If that scan has remained normalized then he will proceed with colonoscopy. She denies fevers but she does report some chills. She says she has lost additional weight. Her weight today is 111 pounds and was 114 pounds 4 weeks ago. She claims her normal weight is 135 pounds. When I saw her frequent 4 weeks ago she had just started a 30 day course of Cipro and Flagyl. She claims that she started developing some rash and swelling 2 days ago and stopped the antibiotics at that time but should have nearly completed that course. She was [facility] for [facility] of diverticulitis with peridiverticular abscess. Patient is very pleasant woman who presents with a caregiver who she uses due to Parkinson's disease. She moved from [place] in 2015. She was diagnosed with acute diverticulitis with peridiverticular abscess on September 15, 2017. This was at [facility] and she was managed with antibiotics. The originally planned to place a percutaneous drain but that was not required. I do not have access to that CT scan. She had follow-up CT scans one month later on October 16 and then again more recently on October 27.   Both of those studies showed continued improvement of the size of the peridiverticular abscess which originally was close to 3 cm then decreased to 1.6 cm and now on the recent study is 1.1 cm and may actually represent a large diverticulum or a resolving abscess. There is very little evidence of residual inflammation. She continues to have some abdominal symptoms but she is not having any fevers or severe abdominal pain. She has chronic constipation and does take a dose of MiraLAX per day. She still has very hard balls for stool. She has not seen any ribbon stools because of these ball-like stools. She had been on Augmentin prior to recent visit with Dr. Ary De Jesus. He saw her 2 days ago on November 15 and change her antibiotics from Augmentin to Cipro and Flagyl. He placed her on a 30 day regimen. In further questioning she is taking medications with her MiraLAX. I discussed with her that this may actually affect the absorption of any of her medications as the water bound to MiraLAX is a reversible and must exit the body via the anus. This will frequently capture medications as well and that practice of taking any medications within one half hour of MiraLAX is discouraged. She has one prior episode in her history of diverticulitis. This was in 2015 weeks she was still living in [place]. Similarly she had a peridiverticular abscess that did not require drainage and was treated with antibiotics. Her last colonoscopy was in 2014 or 2015 prior to her episode of diverticulitis. She was noted to have diverticulosis at that time according to her. She has had upper endoscopies type Dr. Ary De Jesus recently. These were unremarkable. I have believe he is planning on doing a colonoscopy once there is resolution of her diverticulitis. My usual recommendation is no sooner than 6 weeks to 3 months following resolution. This is done sooner if there is a suspicion of malignancy. Her abdominal surgical history is fairly insignificant. She has had a laparoscopic cholecystectomy as well as bilateral tubal ligation. She has not had any colon procedures.     She has noted allergies to oral and IV iodinated contrast as well as metronidazole. All of these have been used recently or currently and seem to be without issue. Past Medical History:   Diagnosis Date    Abnormal gait 7/5/2016    Acute serous otitis media of left ear     Anxiety     Bilateral carpal tunnel syndrome 7/5/2016    Chronic abdominal pain 7/5/2016    Dementia due to Parkinson's disease without behavioral disturbance ([facility] 7/5/2016    Depression     managed by meds    Diverticulitis     Gastritis     GERD (gastroesophageal reflux disease)     Heart murmur     followed by [facility] cardiology. last echo10/2015    Hypercholesterolemia     Hypertension     daily meds, on norvasc prn- took one yesterday for BP of 152/68    Neuropathic pain 7/5/2016    Nontoxic multinodular goiter 7/5/2016    Orthostatic hypotension     managed by meds    Parkinson's disease ([facility]     dx 2014- sometimes requires walker or wheelchair when tired    Peripheral neuropathy     Syncope 10/10/2015    Type 2 diabetes mellitus without complication 9/9/9485    \"They thought I was a diabetic\" - no meds- started checking glucose 2 yrs ago; last A1C=? ; has not checked since 5 mos ago (June 2016)    Urinary frequency 7/5/2016    Vaginal bleeding     Weight loss, unintentional     50 lbs over 2 yrs     Past Surgical History:   Procedure Laterality Date    COLONOSCOPY N/A 8/6/2018    COLONOSCOPY/ 19 performed by Eric Mccollum MD at [address] HX CHOLECYSTECTOMY  2001     [place]     No current facility-administered medications for this encounter. Current Outpatient Prescriptions   Medication Sig    DELZICOL 400 mg cdti capsule Take 400 mg by mouth three (3) times daily.  famotidine (PEPCID) 40 mg tablet Take 40 mg by mouth nightly.  neomycin (MYCIFRADIN) 500 mg tablet Take 1 Tab by mouth three (3) times daily.  Take at 1 PM, 2 PM and 8 PM day prior to surgery    clindamycin (CLEOCIN) 300 mg capsule Take 1 Cap by mouth three (3) times daily. Take at 1 PM, 2 PM and 8 PM day prior to surgery    midodrine (PROAMITINE) 2.5 mg tablet Take  by mouth daily. Indications: Symptomatic Orthostatic Hypotension    ALPRAZolam (XANAX) 0.5 mg tablet Take 0.5 mg by mouth as needed.  clotrimazole (MYCELEX) 10 mg jeremi Take 10 mg by mouth four (4) times daily.  loratadine (CLARITIN) 10 mg tablet Take 10 mg by mouth.  ergocalciferol (VITAMIN D2) 50,000 unit capsule Take 50,000 Units by mouth.  docusate sodium (COLACE) 50 mg capsule Take 50 mg by mouth two (2) times a day.  MEGESTROL ACETATE (MEGACE PO) Take  by mouth.  methIMAzole (TAPAZOLE) 10 mg tablet Take 10 mg by mouth daily.  ondansetron hcl (ZOFRAN) 4 mg tablet Take 1 Tab by mouth three (3) times daily as needed for Nausea.  carbidopa-levodopa (PARCOPA)  mg rapid dissolve tablet 2 tabs q3h for 5 doses and 1 tab qhs    polyethylene glycol (MIRALAX) 17 gram packet Take 17 g by mouth as needed. Indications: Constipation    esomeprazole (NEXIUM) 40 mg capsule Take 40 mg by mouth two (2) times a day. ALLERGIES:  Flagyl [metronidazole];  Aspirin; Contrast agent [iodine]; Gabapentin; Gluten; Milk containing products; Morphine; Omnipaque rediflo [iohexol]; and Red dye    Social History     Social History    Marital status:      Spouse name: N/A    Number of children: N/A    Years of education: N/A     Social History Main Topics    Smoking status: Never Smoker    Smokeless tobacco: Never Used    Alcohol use No    Drug use: No    Sexual activity: Not on file     Other Topics Concern    Not on file     Social History Narrative     History   Smoking Status    Never Smoker   Smokeless Tobacco    Never Used     Family History   Problem Relation Age of Onset    Alzheimer Mother     Diabetes Father     Cancer Father      prostate    Stroke Father     Diabetes Sister     Diabetes Brother     Cancer Brother      kidney and prostate    Sickle Cell Anemia Son      2 sons     Breast Cancer Neg Hx        ROS: The patient has no difficulty with chest pain or shortness of breath. No fever or chills. The patient denies any personal or family history of abnormal clotting or bleeding. Comprehensive review of systems was otherwise unremarkable except as noted above. Physical Exam:   Constitutional: Alert oriented cooperative patient in no acute distress. Visit Vitals    /78 (BP 1 Location: Left arm, BP Patient Position: Sitting)  Comment: 120/80 standing    Pulse 76    Resp 19    Ht 5' 7\" (1.702 m)    Wt 119 lb 1.6 oz (54 kg)    SpO2 98%    BMI 18.65 kg/m2       Eyes:Sclera are clear without icterus. ENMT: no obvious neck masses, no ear or lip lesions  CV: RRR. Normal perfusion  Resp: No JVD. Breathing is  non-labored. GI: thin, exophytic lesion of umbilicus, nontender and no palpable mass though her exam is difficult due to abdominal wall contraction/rigidity, no easily identified scars. Able to be more easily palpated while sitting and abdomen was completely benign when distracted, more tenderness and fullness in the left lower quadrant. Musculoskeletal: unremarkable with normal function. Neuro:  No obvious focal deficits  Psychiatric: normal affect and mood, no memory impairment    Lab Results   Component Value Date/Time    WBC 8.5 08/20/2018 11:04 PM    HGB 11.6 (L) 08/20/2018 11:04 PM    HCT 36.9 08/20/2018 11:04 PM    PLATELET 679 40/16/5152 11:04 PM    MCV 80.9 08/20/2018 11:04 PM       Lab Results   Component Value Date/Time    Sodium 139 08/20/2018 11:04 PM    Potassium 3.6 08/20/2018 11:04 PM    Chloride 102 08/20/2018 11:04 PM    CO2 28 08/20/2018 11:04 PM    BUN 9 08/20/2018 11:04 PM    Creatinine 0.94 08/20/2018 11:04 PM    Glucose 114 (H) 08/20/2018 11:04 PM    Bilirubin, total 0.5 08/20/2018 11:04 PM    AST (SGOT) 22 08/20/2018 11:04 PM    Alk.  phosphatase 74 08/20/2018 11:04 PM    Amylase 76 11/22/2016 11:14 AM    Lipase 142 08/20/2018 11:04 PM     Lab Results   Component Value Date/Time    ALT (SGPT) 8 (L) 08/20/2018 11:04 PM     CT OF THE ABDOMEN AND PELVIS WITH CONTRAST: 10/27/2017  CLINICAL INDICATION: Abdominal pain, follow-up diverticular abscess in the left  lower abdomen. PROCEDURE: Serial thin section axial images obtained from the lung bases through  the proximal femurs following the administration of 100 cc of Isovue 370  intravenous contrast.  Radiation dose reduction techniques were used for this  study. Our CT scanners use one or all of the following: Automated exposure  control, adjusted of the mA and/or kV according to patient size, iterative  reconstruction  COMPARISON: CT of the abdomen and pelvis dated 10/16/2017 and 9/15/2017  FINDINGS:   CT ABDOMEN: There is a slight decrease in the size of the extraluminal air in  the left lower abdominal quadrant that now measures only 11 mm. This is likely  residual abscess. Note there is no discernible fluid within the collection. Patient is status post cholecystectomy with mild intra and extra hepatic bile  duct dilation. No obvious stones. The pancreas, liver, spleen, and bilateral  kidneys are unremarkable. The adrenal glands are normal. No additional or new  bowel inflammation. CT PELVIS: There is extensive sigmoid colon diverticulosis. The bladder is  well-distended with smooth thin walls. A uterine fibroid is present. No free  fluid acutely dependently in the pelvis. No aggressive osseous lesions identified.     IMPRESSION:  1. Minimal residual inflammation in the left lower abdominal quadrant with a  small presumably extraluminal collection of air that now measures 11 mm in  keeping with resolving abscess. Note this could also be a prominent diverticula. 2. Uterine fibroid  3. Sigmoid colon diverticulosis. 4. Mild intra and extrahepatic bile duct dilation.      Image from 10/16/2017      CT ABDOMEN AND PELVIS WITH CONTRAST: 12/18/2017  HISTORY: abd pain, hx of diverticular dz. wt loss, nausea/vomiting, 79 years  Female ?c/o abdominal pain- states she was recently dx with diverticulitis. Pt  states nausea. Pt also c/o blood in urine and generalized body pain   BMI 17.4  COMPARISON: CT abdomen October 27, 2017  TECHNIQUE: Oral contrast was not administered. 100 cc of nonionic intravenous  contrast was injected, and axial helical CT images were obtained from above the  diaphragm through the pelvis. Coronal reformatted images were obtained at the  scanner console and made available for review. Radiation dose reduction  techniques were used for this study:  Our CT scanners use one or all of the  following: Automated exposure control, adjustment of the mA and/or kVp according  to patient's size, iterative reconstruction. FINDINGS:  ABDOMEN:  Minimal dependent subsegmental atelectasis bilateral lung bases. Evidence of  cholecystectomy. Persistent diffuse hepatic steatosis. Normal-appearing  spleen, pancreas, bilateral adrenal glands. Small simple appearing right renal  interpolar region cortical cyst unchanged. Subcentimeter left renal cortical  hypoenhancing lesions also unchanged, too small to characterize. Left renal  upper pole cortical scarring unchanged. Mild calcific atherosclerosis of a  normal caliber abdominal aorta. No evidence of significant lymphadenopathy. Normal-appearing small bowel. No evidence of intraperitoneal free air or free  fluid. PELVIS:  Normal-appearing urinary bladder. Ovoid mass in the anterior uterine myometrium  measuring up to 2.5 cm appears unchanged, may represent a fibroid. Normal-appearing bilateral adnexa. Large stool ball in the rectum. Stool is  seen throughout the colon. There is no definite evidence of residual  diverticular abscess. Persistent sigmoid diverticulosis. No evidence of pelvic  free fluid.   No evidence of significant inguinal or pelvic sidewall  lymphadenopathy. Visualized osseous structures unremarkable.     IMPRESSION:   1. No acute pathology identified. No evidence of residual diverticular  abscess. 2.  Other chronic findings as above.           CT OF THE ABDOMEN AND PELVIS WITH CONTRAST: 8/7/2018  CLINICAL INDICATION: Moderate diffuse abdominal pain for three months,  diverticulitis  PROCEDURE: Serial thin section axial images obtained from the lung bases through  the proximal femurs following the administration of 100 cc of Isovue 370  intravenous contrast.  Radiation dose reduction techniques were used for this  study. Our CT scanners use one or all of the following: Automated exposure  control, adjusted of the mA and/or kV according to patient size, iterative  reconstruction  COMPARISON: CT of the abdomen and pelvis dated 5/31/20181  FINDINGS:   The lung bases are clear. CT ABDOMEN: The liver, spleen, pancreas, and kidneys are unremarkable. The  gallbladder is been resected. The adrenal glands are normal. There is no ascites  or adenopathy. There is a long segment of circumferential wall thickening  involving the ascending colon with mild inflammation in the pericolonic fat in  keeping with a colitis. There is more subtle involvement of the transverse colon  as well. No bowel obstruction or perforation evident. No free air identified. The small bowel is unremarkable. CT PELVIS: The bladder is normal. The rectum and sigmoid colon show diverticular  change without acute diverticulitis. The uterus and adnexa are unremarkable. No  free fluid accumulating dependently in the pelvis. No aggressive osseous lesions identified.     IMPRESSION:  1. Long segment colitis involving the ascending colon and proximal portion of  the transverse colon. 2. Sigmoid colon diverticulosis. Assessment/Plan:     Eleanor Johns is a 76 y.o. female who has history of complicated diverticulitis with peridiverticular abscess.   She appears to have a chronic recurrent diverticulitis with purulent drainage seen at colonoscopy in early August.  I think it is reasonable to pursue sigmoid colectomy. It is unclear if all of her symptoms are related to chronic diverticulitis but certainly with her history of abscess and the purulence seen she is at much higher risk for another complication. We talked about sigmoid colectomy with primary anastomosis with the possibility of a colostomy if active infection or poor tissue quality mixed performing the anastomosis unsafe. We will place a ureteral catheters preoperatively. We discussed the need for bowel prep both mechanical and antibiotic. She has had a prior appendectomy and cholecystectomy. We discussed proceeding with sigmoid colectomy with EEA anastomosis. We discussed the possibility of a Sid type procedure with sigmoid colectomy and end colostomy. She does not have any issues with incontinence and that she can be connected primarily I will do so. We will have a marked by the wound care ostomy nurse in preop. We will have urology place ureteral catheters. I discussed the patient's condition and treatment options with the patient. I discussed risks of surgery in language the patient could understand including bleeding, infection, need for further surgery, abscess, fistula, SBO, DVT, PE, heart attack, stroke, renal failure, respiratory failure, ventilatory dependence, and death. The patient voiced understanding of all this and all questions were answered. Alternatives to surgery were discussed also and risks of the alternatives. The patient requested that we proceed with surgery. Informed consent was obtained.      Problem List  Date Reviewed: 8/31/2018          Codes Class Noted    Type 2 diabetes mellitus with nephropathy [facility]) ICD-10-CM: E11.21  ICD-9-CM: 250.40, 583.81  1/22/2018        Diverticular disease of intestine with perforation and abscess ICD-10-CM: K57.80  ICD-9-CM: 562.11 11/17/2017        Constipation ICD-10-CM: K59.00  ICD-9-CM: 564.00  11/17/2017        Nausea and vomiting ICD-10-CM: R11.2  ICD-9-CM: 787.01  11/7/2016        Stomach ache ICD-10-CM: R10.9  ICD-9-CM: 536.8  11/7/2016        RBD (REM behavioral disorder) ICD-10-CM: G47.52  ICD-9-CM: 327.42  9/29/2016        Neurologic orthostatic hypotension (HCC) ICD-10-CM: G90.3  ICD-9-CM: 333.0  9/6/2016        Anxiety and depression ICD-10-CM: F41.9, F32.9  ICD-9-CM: 300.00, 311  9/6/2016        Loss of appetite ICD-10-CM: R63.0  ICD-9-CM: 783.0  9/6/2016        Psychosomatic factor in physical condition ICD-10-CM: F54  ICD-9-CM: 263  9/6/2016        Neuropathy associated with endocrine disorder ([facility] ICD-10-CM: E34.9, G63  ICD-9-CM: 355.9, 259.9  7/21/2016        Parkinson disease ([facility] ICD-10-CM: Katelynn Abbot  ICD-9-CM: 332.0  7/21/2016        Tremor ICD-10-CM: R25.1  ICD-9-CM: 781.0  7/21/2016        Nontoxic multinodular goiter ICD-10-CM: E04.2  ICD-9-CM: 241.1  7/5/2016        Abnormal gait ICD-10-CM: R26.9  ICD-9-CM: 781.2  7/5/2016        Chronic abdominal pain ICD-10-CM: R10.9, G89.29  ICD-9-CM: 789.00, 338.29  7/5/2016        GERD (gastroesophageal reflux disease) ICD-10-CM: K21.9  ICD-9-CM: 530.81  7/5/2016        Anxiety ICD-10-CM: F41.9  ICD-9-CM: 300.00  7/5/2016        Type 2 diabetes mellitus without complication UOP-91-FU: X97.7  ICD-9-CM: 250.00  7/5/2016        Bilateral carpal tunnel syndrome ICD-10-CM: G56.03  ICD-9-CM: 354.0  7/5/2016        Urinary frequency ICD-10-CM: R35.0  ICD-9-CM: 788.41  7/5/2016        Neuropathic pain ICD-10-CM: M79.2  ICD-9-CM: 729.2  7/5/2016        Hypertension ICD-10-CM: I10  ICD-9-CM: 401.9  Unknown        Parkinson's disease ([facility] ICD-10-CM: G20  ICD-9-CM: 332.0  Unknown        Peripheral neuropathy ICD-10-CM: G62.9  ICD-9-CM: 356.9  Unknown        Syncope ICD-10-CM: R55  ICD-9-CM: 780.2  10/10/2015        Parkinsonism ([facility] ICD-10-CM: G20  ICD-9-CM: 332.0  8/10/2015                Keri Harrington Seymour Romano MD,  FACS

## 2018-09-05 ENCOUNTER — HOSPITAL ENCOUNTER (INPATIENT)
Age: 68
LOS: 6 days | Discharge: NURSING, REHAB OR LONG-TERM CARE | DRG: 330 | End: 2018-09-11
Attending: SURGERY | Admitting: SURGERY
Payer: MEDICARE

## 2018-09-05 ENCOUNTER — ANESTHESIA (OUTPATIENT)
Dept: SURGERY | Age: 68
DRG: 330 | End: 2018-09-05
Payer: MEDICARE

## 2018-09-05 DIAGNOSIS — F41.9 ANXIETY AND DEPRESSION: ICD-10-CM

## 2018-09-05 DIAGNOSIS — F32.A ANXIETY AND DEPRESSION: ICD-10-CM

## 2018-09-05 DIAGNOSIS — Z90.49 S/P COLECTOMY: Primary | ICD-10-CM

## 2018-09-05 DIAGNOSIS — G20 PRIMARY PARKINSONISM (HCC): ICD-10-CM

## 2018-09-05 PROBLEM — K57.20 DIVERTICULITIS OF LARGE INTESTINE WITH ABSCESS: Status: ACTIVE | Noted: 2018-09-05

## 2018-09-05 LAB
ABO + RH BLD: NORMAL
BLOOD GROUP ANTIBODIES SERPL: NORMAL
GLUCOSE BLD STRIP.AUTO-MCNC: 88 MG/DL (ref 65–100)
SPECIMEN EXP DATE BLD: NORMAL

## 2018-09-05 PROCEDURE — 77030016154: Performed by: SURGERY

## 2018-09-05 PROCEDURE — 87205 SMEAR GRAM STAIN: CPT

## 2018-09-05 PROCEDURE — 74011250636 HC RX REV CODE- 250/636

## 2018-09-05 PROCEDURE — 77030011278 HC ELECTRD LIG IMPT COVD -F: Performed by: SURGERY

## 2018-09-05 PROCEDURE — C1758 CATHETER, URETERAL: HCPCS | Performed by: SURGERY

## 2018-09-05 PROCEDURE — 76010000174 HC OR TIME 3.5 TO 4 HR INTENSV-TIER 1: Performed by: SURGERY

## 2018-09-05 PROCEDURE — 77030039425 HC BLD LARYNG TRULITE DISP TELE -A: Performed by: ANESTHESIOLOGY

## 2018-09-05 PROCEDURE — 87153 DNA/RNA SEQUENCING: CPT

## 2018-09-05 PROCEDURE — 77030018832 HC SOL IRR H20 ICUM -A: Performed by: SURGERY

## 2018-09-05 PROCEDURE — 82962 GLUCOSE BLOOD TEST: CPT

## 2018-09-05 PROCEDURE — 77030009962 HC RELD STPLR CR J&J -C: Performed by: SURGERY

## 2018-09-05 PROCEDURE — 77030020407 HC IV BLD WRMR ST 3M -A: Performed by: ANESTHESIOLOGY

## 2018-09-05 PROCEDURE — 87076 CULTURE ANAEROBE IDENT EACH: CPT

## 2018-09-05 PROCEDURE — 76060000038 HC ANESTHESIA 3.5 TO 4 HR: Performed by: SURGERY

## 2018-09-05 PROCEDURE — 76210000006 HC OR PH I REC 0.5 TO 1 HR: Performed by: SURGERY

## 2018-09-05 PROCEDURE — 77030010285 HC STPLR INT PRSTRNG COVD -B: Performed by: SURGERY

## 2018-09-05 PROCEDURE — 77030020255 HC SOL INJ LR 1000ML BG

## 2018-09-05 PROCEDURE — 77030008703 HC TU ET UNCUF COVD -A: Performed by: ANESTHESIOLOGY

## 2018-09-05 PROCEDURE — 77030008771 HC TU NG SALEM SUMP -A: Performed by: ANESTHESIOLOGY

## 2018-09-05 PROCEDURE — 86901 BLOOD TYPING SEROLOGIC RH(D): CPT

## 2018-09-05 PROCEDURE — 77030002996 HC SUT SLK J&J -A: Performed by: SURGERY

## 2018-09-05 PROCEDURE — 77030009980 HC RELD STPLR TR J&J -B: Performed by: SURGERY

## 2018-09-05 PROCEDURE — 77030020782 HC GWN BAIR PAWS FLX 3M -B: Performed by: ANESTHESIOLOGY

## 2018-09-05 PROCEDURE — 74011000250 HC RX REV CODE- 250: Performed by: SURGERY

## 2018-09-05 PROCEDURE — 77030010293 HC STPLR INT TI J&J -B: Performed by: SURGERY

## 2018-09-05 PROCEDURE — 74011000258 HC RX REV CODE- 258: Performed by: SURGERY

## 2018-09-05 PROCEDURE — 77030008477 HC STYL SATN SLP COVD -A: Performed by: ANESTHESIOLOGY

## 2018-09-05 PROCEDURE — 87186 SC STD MICRODIL/AGAR DIL: CPT

## 2018-09-05 PROCEDURE — 77030010286 HC STPLR ENDOSC COVD -D: Performed by: SURGERY

## 2018-09-05 PROCEDURE — 65270000029 HC RM PRIVATE

## 2018-09-05 PROCEDURE — 77030034850: Performed by: SURGERY

## 2018-09-05 PROCEDURE — 77030031139 HC SUT VCRL2 J&J -A: Performed by: SURGERY

## 2018-09-05 PROCEDURE — 77030018836 HC SOL IRR NACL ICUM -A: Performed by: SURGERY

## 2018-09-05 PROCEDURE — 77030012935 HC DRSG AQUACEL BMS -B: Performed by: SURGERY

## 2018-09-05 PROCEDURE — C1769 GUIDE WIRE: HCPCS | Performed by: SURGERY

## 2018-09-05 PROCEDURE — 74011250636 HC RX REV CODE- 250/636: Performed by: ANESTHESIOLOGY

## 2018-09-05 PROCEDURE — 88307 TISSUE EXAM BY PATHOLOGIST: CPT

## 2018-09-05 PROCEDURE — 87077 CULTURE AEROBIC IDENTIFY: CPT

## 2018-09-05 PROCEDURE — 74011000250 HC RX REV CODE- 250

## 2018-09-05 PROCEDURE — 0D9W0ZX DRAINAGE OF PERITONEUM, OPEN APPROACH, DIAGNOSTIC: ICD-10-PCS | Performed by: SURGERY

## 2018-09-05 PROCEDURE — 74011250636 HC RX REV CODE- 250/636: Performed by: SURGERY

## 2018-09-05 PROCEDURE — 77030002966 HC SUT PDS J&J -A: Performed by: SURGERY

## 2018-09-05 PROCEDURE — 77030019908 HC STETH ESOPH SIMS -A: Performed by: ANESTHESIOLOGY

## 2018-09-05 PROCEDURE — 77030008467 HC STPLR SKN COVD -B: Performed by: SURGERY

## 2018-09-05 PROCEDURE — 77030032490 HC SLV COMPR SCD KNE COVD -B: Performed by: SURGERY

## 2018-09-05 PROCEDURE — 74011250637 HC RX REV CODE- 250/637: Performed by: SURGERY

## 2018-09-05 PROCEDURE — 77030019940 HC BLNKT HYPOTHRM STRY -B: Performed by: ANESTHESIOLOGY

## 2018-09-05 PROCEDURE — 87075 CULTR BACTERIA EXCEPT BLOOD: CPT

## 2018-09-05 PROCEDURE — 77030011264 HC ELECTRD BLD EXT COVD -A: Performed by: SURGERY

## 2018-09-05 PROCEDURE — 77030019927 HC TBNG IRR CYSTO BAXT -A: Performed by: SURGERY

## 2018-09-05 PROCEDURE — 0DTN0ZZ RESECTION OF SIGMOID COLON, OPEN APPROACH: ICD-10-PCS | Performed by: SURGERY

## 2018-09-05 RX ORDER — CARBIDOPA AND LEVODOPA 25; 100 MG/1; MG/1
2 TABLET, ORALLY DISINTEGRATING ORAL
Status: DISCONTINUED | OUTPATIENT
Start: 2018-09-05 | End: 2018-09-05 | Stop reason: SDUPTHER

## 2018-09-05 RX ORDER — ROCURONIUM BROMIDE 10 MG/ML
INJECTION, SOLUTION INTRAVENOUS AS NEEDED
Status: DISCONTINUED | OUTPATIENT
Start: 2018-09-05 | End: 2018-09-05 | Stop reason: HOSPADM

## 2018-09-05 RX ORDER — CELECOXIB 200 MG/1
200 CAPSULE ORAL
Status: DISCONTINUED | OUTPATIENT
Start: 2018-09-05 | End: 2018-09-05 | Stop reason: HOSPADM

## 2018-09-05 RX ORDER — ALVIMOPAN 12 MG/1
12 CAPSULE ORAL 2 TIMES DAILY
Status: DISCONTINUED | OUTPATIENT
Start: 2018-09-05 | End: 2018-09-10

## 2018-09-05 RX ORDER — HYDROMORPHONE HYDROCHLORIDE 2 MG/ML
0.5 INJECTION, SOLUTION INTRAMUSCULAR; INTRAVENOUS; SUBCUTANEOUS
Status: DISCONTINUED | OUTPATIENT
Start: 2018-09-05 | End: 2018-09-05 | Stop reason: HOSPADM

## 2018-09-05 RX ORDER — EPHEDRINE SULFATE 50 MG/ML
INJECTION, SOLUTION INTRAVENOUS AS NEEDED
Status: DISCONTINUED | OUTPATIENT
Start: 2018-09-05 | End: 2018-09-05 | Stop reason: HOSPADM

## 2018-09-05 RX ORDER — SODIUM CHLORIDE 0.9 % (FLUSH) 0.9 %
5-10 SYRINGE (ML) INJECTION AS NEEDED
Status: DISCONTINUED | OUTPATIENT
Start: 2018-09-05 | End: 2018-09-05 | Stop reason: HOSPADM

## 2018-09-05 RX ORDER — LIDOCAINE HYDROCHLORIDE 20 MG/ML
INJECTION, SOLUTION EPIDURAL; INFILTRATION; INTRACAUDAL; PERINEURAL AS NEEDED
Status: DISCONTINUED | OUTPATIENT
Start: 2018-09-05 | End: 2018-09-05 | Stop reason: HOSPADM

## 2018-09-05 RX ORDER — CLOTRIMAZOLE 10 MG/1
10 LOZENGE ORAL; TOPICAL 4 TIMES DAILY
Status: DISCONTINUED | OUTPATIENT
Start: 2018-09-05 | End: 2018-09-06

## 2018-09-05 RX ORDER — CARBIDOPA AND LEVODOPA 25; 100 MG/1; MG/1
1 TABLET, ORALLY DISINTEGRATING ORAL
Status: DISCONTINUED | OUTPATIENT
Start: 2018-09-06 | End: 2018-09-11 | Stop reason: HOSPADM

## 2018-09-05 RX ORDER — ALPRAZOLAM 0.5 MG/1
0.5 TABLET ORAL
Status: DISCONTINUED | OUTPATIENT
Start: 2018-09-05 | End: 2018-09-11 | Stop reason: HOSPADM

## 2018-09-05 RX ORDER — KETAMINE HYDROCHLORIDE 100 MG/ML
INJECTION, SOLUTION INTRAMUSCULAR; INTRAVENOUS AS NEEDED
Status: DISCONTINUED | OUTPATIENT
Start: 2018-09-05 | End: 2018-09-05 | Stop reason: HOSPADM

## 2018-09-05 RX ORDER — LIDOCAINE HYDROCHLORIDE 5 MG/ML
INJECTION, SOLUTION INFILTRATION; INTRAVENOUS
Status: DISCONTINUED | OUTPATIENT
Start: 2018-09-05 | End: 2018-09-05 | Stop reason: HOSPADM

## 2018-09-05 RX ORDER — ONDANSETRON 2 MG/ML
INJECTION INTRAMUSCULAR; INTRAVENOUS AS NEEDED
Status: DISCONTINUED | OUTPATIENT
Start: 2018-09-05 | End: 2018-09-05 | Stop reason: HOSPADM

## 2018-09-05 RX ORDER — CARBIDOPA AND LEVODOPA 25; 100 MG/1; MG/1
2 TABLET ORAL
Status: DISCONTINUED | OUTPATIENT
Start: 2018-09-05 | End: 2018-09-11 | Stop reason: HOSPADM

## 2018-09-05 RX ORDER — SODIUM CHLORIDE, SODIUM LACTATE, POTASSIUM CHLORIDE, CALCIUM CHLORIDE 600; 310; 30; 20 MG/100ML; MG/100ML; MG/100ML; MG/100ML
INJECTION, SOLUTION INTRAVENOUS
Status: DISCONTINUED | OUTPATIENT
Start: 2018-09-05 | End: 2018-09-05 | Stop reason: HOSPADM

## 2018-09-05 RX ORDER — LIDOCAINE HYDROCHLORIDE ANHYDROUS AND DEXTROSE MONOHYDRATE .4; 5 G/100ML; G/100ML
INJECTION, SOLUTION INTRAVENOUS
Status: DISCONTINUED | OUTPATIENT
Start: 2018-09-05 | End: 2018-09-05 | Stop reason: HOSPADM

## 2018-09-05 RX ORDER — ACETAMINOPHEN 10 MG/ML
1000 INJECTION, SOLUTION INTRAVENOUS EVERY 8 HOURS
Status: COMPLETED | OUTPATIENT
Start: 2018-09-05 | End: 2018-09-08

## 2018-09-05 RX ORDER — LABETALOL HYDROCHLORIDE 5 MG/ML
5 INJECTION, SOLUTION INTRAVENOUS ONCE
Status: DISCONTINUED | OUTPATIENT
Start: 2018-09-05 | End: 2018-09-05 | Stop reason: HOSPADM

## 2018-09-05 RX ORDER — MEGESTROL ACETATE 40 MG/ML
200 SUSPENSION ORAL DAILY
Status: DISCONTINUED | OUTPATIENT
Start: 2018-09-06 | End: 2018-09-11 | Stop reason: HOSPADM

## 2018-09-05 RX ORDER — SODIUM CHLORIDE 0.9 % (FLUSH) 0.9 %
5-10 SYRINGE (ML) INJECTION EVERY 8 HOURS
Status: DISCONTINUED | OUTPATIENT
Start: 2018-09-05 | End: 2018-09-05 | Stop reason: HOSPADM

## 2018-09-05 RX ORDER — MIDAZOLAM HYDROCHLORIDE 1 MG/ML
2 INJECTION, SOLUTION INTRAMUSCULAR; INTRAVENOUS
Status: DISCONTINUED | OUTPATIENT
Start: 2018-09-05 | End: 2018-09-05 | Stop reason: HOSPADM

## 2018-09-05 RX ORDER — ALVIMOPAN 12 MG/1
12 CAPSULE ORAL
Status: COMPLETED | OUTPATIENT
Start: 2018-09-05 | End: 2018-09-05

## 2018-09-05 RX ORDER — PROPOFOL 10 MG/ML
INJECTION, EMULSION INTRAVENOUS AS NEEDED
Status: DISCONTINUED | OUTPATIENT
Start: 2018-09-05 | End: 2018-09-05 | Stop reason: HOSPADM

## 2018-09-05 RX ORDER — ALBUTEROL SULFATE 0.83 MG/ML
2.5 SOLUTION RESPIRATORY (INHALATION) AS NEEDED
Status: DISCONTINUED | OUTPATIENT
Start: 2018-09-05 | End: 2018-09-05 | Stop reason: HOSPADM

## 2018-09-05 RX ORDER — OXYCODONE HYDROCHLORIDE 5 MG/1
5 TABLET ORAL
Status: DISCONTINUED | OUTPATIENT
Start: 2018-09-05 | End: 2018-09-05 | Stop reason: HOSPADM

## 2018-09-05 RX ORDER — FAMOTIDINE 10 MG/ML
20 INJECTION INTRAVENOUS EVERY 12 HOURS
Status: DISCONTINUED | OUTPATIENT
Start: 2018-09-05 | End: 2018-09-05 | Stop reason: SDUPTHER

## 2018-09-05 RX ORDER — SODIUM CHLORIDE, SODIUM LACTATE, POTASSIUM CHLORIDE, CALCIUM CHLORIDE 600; 310; 30; 20 MG/100ML; MG/100ML; MG/100ML; MG/100ML
75 INJECTION, SOLUTION INTRAVENOUS CONTINUOUS
Status: DISCONTINUED | OUTPATIENT
Start: 2018-09-05 | End: 2018-09-06

## 2018-09-05 RX ORDER — HYDROMORPHONE HYDROCHLORIDE 1 MG/ML
.1-.3 INJECTION, SOLUTION INTRAMUSCULAR; INTRAVENOUS; SUBCUTANEOUS
Status: DISCONTINUED | OUTPATIENT
Start: 2018-09-05 | End: 2018-09-11 | Stop reason: HOSPADM

## 2018-09-05 RX ORDER — LABETALOL HYDROCHLORIDE 5 MG/ML
INJECTION, SOLUTION INTRAVENOUS
Status: ACTIVE
Start: 2018-09-05 | End: 2018-09-06

## 2018-09-05 RX ORDER — MIDAZOLAM HYDROCHLORIDE 1 MG/ML
2 INJECTION, SOLUTION INTRAMUSCULAR; INTRAVENOUS ONCE
Status: DISCONTINUED | OUTPATIENT
Start: 2018-09-05 | End: 2018-09-05 | Stop reason: HOSPADM

## 2018-09-05 RX ORDER — ONDANSETRON 2 MG/ML
4 INJECTION INTRAMUSCULAR; INTRAVENOUS
Status: DISCONTINUED | OUTPATIENT
Start: 2018-09-05 | End: 2018-09-11 | Stop reason: HOSPADM

## 2018-09-05 RX ORDER — LIDOCAINE HYDROCHLORIDE 10 MG/ML
0.1 INJECTION INFILTRATION; PERINEURAL AS NEEDED
Status: DISCONTINUED | OUTPATIENT
Start: 2018-09-05 | End: 2018-09-05 | Stop reason: HOSPADM

## 2018-09-05 RX ORDER — LORATADINE 10 MG/1
10 TABLET ORAL
Status: DISCONTINUED | OUTPATIENT
Start: 2018-09-05 | End: 2018-09-11 | Stop reason: HOSPADM

## 2018-09-05 RX ORDER — SODIUM CHLORIDE, SODIUM LACTATE, POTASSIUM CHLORIDE, CALCIUM CHLORIDE 600; 310; 30; 20 MG/100ML; MG/100ML; MG/100ML; MG/100ML
75 INJECTION, SOLUTION INTRAVENOUS CONTINUOUS
Status: DISCONTINUED | OUTPATIENT
Start: 2018-09-05 | End: 2018-09-05 | Stop reason: HOSPADM

## 2018-09-05 RX ORDER — MIDODRINE HYDROCHLORIDE 5 MG/1
2.5 TABLET ORAL DAILY
Status: DISCONTINUED | OUTPATIENT
Start: 2018-09-06 | End: 2018-09-11 | Stop reason: HOSPADM

## 2018-09-05 RX ORDER — FENTANYL CITRATE 50 UG/ML
100 INJECTION, SOLUTION INTRAMUSCULAR; INTRAVENOUS ONCE
Status: DISCONTINUED | OUTPATIENT
Start: 2018-09-05 | End: 2018-09-05 | Stop reason: HOSPADM

## 2018-09-05 RX ORDER — SODIUM CHLORIDE, SODIUM LACTATE, POTASSIUM CHLORIDE, CALCIUM CHLORIDE 600; 310; 30; 20 MG/100ML; MG/100ML; MG/100ML; MG/100ML
50 INJECTION, SOLUTION INTRAVENOUS CONTINUOUS
Status: DISCONTINUED | OUTPATIENT
Start: 2018-09-05 | End: 2018-09-05 | Stop reason: HOSPADM

## 2018-09-05 RX ORDER — FENTANYL CITRATE 50 UG/ML
INJECTION, SOLUTION INTRAMUSCULAR; INTRAVENOUS AS NEEDED
Status: DISCONTINUED | OUTPATIENT
Start: 2018-09-05 | End: 2018-09-05 | Stop reason: HOSPADM

## 2018-09-05 RX ORDER — ONDANSETRON 2 MG/ML
4 INJECTION INTRAMUSCULAR; INTRAVENOUS ONCE
Status: COMPLETED | OUTPATIENT
Start: 2018-09-05 | End: 2018-09-05

## 2018-09-05 RX ORDER — DEXAMETHASONE SODIUM PHOSPHATE 100 MG/10ML
INJECTION INTRAMUSCULAR; INTRAVENOUS AS NEEDED
Status: DISCONTINUED | OUTPATIENT
Start: 2018-09-05 | End: 2018-09-05 | Stop reason: HOSPADM

## 2018-09-05 RX ADMIN — PROPOFOL 140 MG: 10 INJECTION, EMULSION INTRAVENOUS at 08:27

## 2018-09-05 RX ADMIN — FENTANYL CITRATE 50 MCG: 50 INJECTION, SOLUTION INTRAMUSCULAR; INTRAVENOUS at 08:45

## 2018-09-05 RX ADMIN — ROCURONIUM BROMIDE 50 MG: 10 INJECTION, SOLUTION INTRAVENOUS at 08:28

## 2018-09-05 RX ADMIN — DEXAMETHASONE SODIUM PHOSPHATE 2 MG: 100 INJECTION INTRAMUSCULAR; INTRAVENOUS at 11:32

## 2018-09-05 RX ADMIN — ONDANSETRON 4 MG: 2 INJECTION, SOLUTION INTRAMUSCULAR; INTRAVENOUS at 06:57

## 2018-09-05 RX ADMIN — SODIUM CHLORIDE 900 MG: 900 INJECTION, SOLUTION INTRAVENOUS at 06:59

## 2018-09-05 RX ADMIN — ALVIMOPAN 12 MG: 12 CAPSULE ORAL at 17:07

## 2018-09-05 RX ADMIN — EPHEDRINE SULFATE 10 MG: 50 INJECTION, SOLUTION INTRAVENOUS at 09:02

## 2018-09-05 RX ADMIN — CARBIDOPA AND LEVODOPA 2 TABLET: 25; 100 TABLET ORAL at 21:30

## 2018-09-05 RX ADMIN — ALVIMOPAN 12 MG: 12 CAPSULE ORAL at 06:56

## 2018-09-05 RX ADMIN — CLOTRIMAZOLE 10 MG: 10 LOZENGE ORAL at 21:21

## 2018-09-05 RX ADMIN — KETAMINE HYDROCHLORIDE 10 MG: 100 INJECTION, SOLUTION INTRAMUSCULAR; INTRAVENOUS at 10:45

## 2018-09-05 RX ADMIN — KETAMINE HYDROCHLORIDE 30 MG: 100 INJECTION, SOLUTION INTRAMUSCULAR; INTRAVENOUS at 08:45

## 2018-09-05 RX ADMIN — HYDROMORPHONE HYDROCHLORIDE 0.5 MG: 2 INJECTION, SOLUTION INTRAMUSCULAR; INTRAVENOUS; SUBCUTANEOUS at 14:23

## 2018-09-05 RX ADMIN — CARBIDOPA AND LEVODOPA 2 TABLET: 25; 100 TABLET, ORALLY DISINTEGRATING ORAL at 18:07

## 2018-09-05 RX ADMIN — ONDANSETRON 4 MG: 2 INJECTION INTRAMUSCULAR; INTRAVENOUS at 11:25

## 2018-09-05 RX ADMIN — KETAMINE HYDROCHLORIDE 10 MG: 100 INJECTION, SOLUTION INTRAMUSCULAR; INTRAVENOUS at 09:45

## 2018-09-05 RX ADMIN — SODIUM CHLORIDE, SODIUM LACTATE, POTASSIUM CHLORIDE, AND CALCIUM CHLORIDE 75 ML/HR: 600; 310; 30; 20 INJECTION, SOLUTION INTRAVENOUS at 06:57

## 2018-09-05 RX ADMIN — LIDOCAINE HYDROCHLORIDE 1 MG/KG/HR: 5 INJECTION, SOLUTION INFILTRATION; INTRAVENOUS at 08:45

## 2018-09-05 RX ADMIN — SODIUM CHLORIDE, SODIUM LACTATE, POTASSIUM CHLORIDE, CALCIUM CHLORIDE: 600; 310; 30; 20 INJECTION, SOLUTION INTRAVENOUS at 08:40

## 2018-09-05 RX ADMIN — ALPRAZOLAM 0.5 MG: 0.5 TABLET ORAL at 18:09

## 2018-09-05 RX ADMIN — CLOTRIMAZOLE 10 MG: 10 LOZENGE ORAL at 18:00

## 2018-09-05 RX ADMIN — DEXAMETHASONE SODIUM PHOSPHATE 3 MG: 100 INJECTION INTRAMUSCULAR; INTRAVENOUS at 11:41

## 2018-09-05 RX ADMIN — ACETAMINOPHEN 1000 MG: 10 INJECTION, SOLUTION INTRAVENOUS at 17:00

## 2018-09-05 RX ADMIN — GENTAMICIN SULFATE 270 MG: 40 INJECTION, SOLUTION INTRAMUSCULAR; INTRAVENOUS at 08:40

## 2018-09-05 RX ADMIN — DEXAMETHASONE SODIUM PHOSPHATE 3 MG: 100 INJECTION INTRAMUSCULAR; INTRAVENOUS at 11:38

## 2018-09-05 RX ADMIN — SODIUM CHLORIDE, SODIUM LACTATE, POTASSIUM CHLORIDE, AND CALCIUM CHLORIDE 75 ML/HR: 600; 310; 30; 20 INJECTION, SOLUTION INTRAVENOUS at 15:40

## 2018-09-05 RX ADMIN — FENTANYL CITRATE 50 MCG: 50 INJECTION, SOLUTION INTRAMUSCULAR; INTRAVENOUS at 08:27

## 2018-09-05 RX ADMIN — DEXAMETHASONE SODIUM PHOSPHATE 2 MG: 100 INJECTION INTRAMUSCULAR; INTRAVENOUS at 11:35

## 2018-09-05 RX ADMIN — LIDOCAINE HYDROCHLORIDE ANHYDROUS AND DEXTROSE MONOHYDRATE 1 MG/MIN: .4; 5 INJECTION, SOLUTION INTRAVENOUS at 08:35

## 2018-09-05 RX ADMIN — LIDOCAINE HYDROCHLORIDE 60 MG: 20 INJECTION, SOLUTION EPIDURAL; INFILTRATION; INTRACAUDAL; PERINEURAL at 08:27

## 2018-09-05 RX ADMIN — HYDROMORPHONE HYDROCHLORIDE 0.3 MG: 1 INJECTION, SOLUTION INTRAMUSCULAR; INTRAVENOUS; SUBCUTANEOUS at 19:58

## 2018-09-05 NOTE — BRIEF OP NOTE
BRIEF OPERATIVE NOTE    Date of Procedure: 9/5/2018   Preoperative Diagnosis: Diverticulitis of intestine without bleeding, unspecified complication status, unspecified part of intestinal tract [K57.92]  Postoperative Diagnosis: Diverticulitis of sigmoid colon with peridiverticular abscess    Procedure(s):  OPEN SIGMOID COLECTOMY WITH EEA  ANASTOMOSIS, DRAINAGE OF LOCALIZED ABSCESS, MOBILIZATION OF SPLENIC FLEXURE    Cystoscopy and bilateral ureteral catheter placement  Surgeon(s) and Role:  Panel 1:     * Duane Hills MD - Primary    Panel 2:     * Allan Lozada MD - Primary         Surgical Assistant: none    Surgical Staff:  Circ-1: Lucille Casanova RN  Circ-Relief: Amol Moreno RN  Scrub Tech-2: Fer Lopez; Laurita Urrutia  Event Time In   Incision Start 0432   Incision Close 1130     Anesthesia: General   Estimated Blood Loss: ~50 cc  Specimens:   ID Type Source Tests Collected by Time Destination   1 : SIGMOIID COLON Fresh Colon  Duane Hills MD 9/5/2018 1011 Pathology   1 : INTRAPERITONEAL ABSCESS Body Fluid Abscess CULTURE, ANAEROBIC, CULTURE, BODY FLUID Duane Hills MD 9/5/2018 0945 Microbiology      Findings: ureteral catheters easily placed with large flow from L kidney (obstructed), adhesions to sigmoid colon in LLQ from inflammatory process, no peritonitis, adequate exposure using large Justin and handheld retractors, foreshortened sigmoid colon densely adherent to L lateral side wall after freeing filmy adhesions which uncovered small volume abscess, culture obtained and abscess fully evacuated and drained without gross contamination, sigmoid and proximal rectum divided, L colon at transection without mobility to reach pelvis and splenic flexure mobilized completely creating tension free length, 28 mm EEA sized and EEA coloproctostomy performed at ~12 cm from anus, excellent donuts and - insufflation leak test.  Ureteral catheters removed with intact tips.   Anesthesia placed B TAP blocks at procedure end.    Complications: none apparent  Implants: * No implants in log *    Baron Gloria MD

## 2018-09-05 NOTE — OP NOTES
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 POSTOPERATIVE DIAGNOSIS:  Sigmoid colon tumor for resection and possible reanastomosis or colostomy. PROCEDURES PERFORMED:  Cystoscopy and bilateral ureteral catheter placement. SURGEON:  MARK Azul MD    ASSISTANT:      ANESTHESIA:  General.    ESTIMATED BLOOD LOSS:  None. SPECIMENS REMOVED:      COMPLICATIONS:      IMPLANTS:      OPERATIVE INDICATIONS:  A 55-year-old female with a rectosigmoid mass for resection. The patient had stents placed prior to the surgery to help identify the ureters intraoperatively. PROCEDURE:  The patient was taken to the operating room suite and underwent general anesthesia, placed in the dorsal lithotomy position, prepped and draped in appropriate manner. The Banki.rura cystoscope passed easily in the bladder. There was clear urine in the bladder. No bladder lesions were noted. Left ureteral orifice was identified. A guidewire was passed up the ureter and then a 5-[place] open-ended ureteral catheter was passed over this and up into the kidney. There was prompt drainage of clear urine from the left ureteral stent. The patient then had a guidewire passed up the right ureter and a 5 [place] open-ended ureteral catheter was passed up over the guidewire and into the kidney. It did not have the same drainage. I suspect the left kidney had some mild degree of obstruction. There was prompt drainage of a fairly large volume of urine from the left ureter. The patient had the ureteral catheters left in place in both kidneys. The cystoscope was removed. Collado catheter was inserted into the bladder.   The left ureteral stent was connected to a drainage bag and the right ureter that was not draining as much was placed into a nonsterile glove. The patient had the ureteral catheters taped to the Collado catheter and these bags were placed up next to the nurse anesthetist, at which point the drapes were removed and the patient will be reprepped for the colon portion of the procedure.       MD RORY Graff / DOMO  D: 09/05/2018 09:08     T: 09/05/2018 09:41  JOB #: [id number]

## 2018-09-05 NOTE — PERIOP NOTES
TRANSFER - OUT REPORT:    Verbal report given to GERRI(name) on Andrew Brown  being transferred to [facility](unit) for routine post - op       Report consisted of patients Situation, Background, Assessment and   Recommendations(SBAR). Information from the following report(s) OR Summary, Intake/Output and MAR was reviewed with the receiving nurse. Lines:   Peripheral IV 09/05/18 Right Hand (Active)   Site Assessment Clean, dry, & intact 9/5/2018 12:43 PM   Phlebitis Assessment 0 9/5/2018 12:43 PM   Infiltration Assessment 0 9/5/2018 12:43 PM   Dressing Status Clean, dry, & intact 9/5/2018 12:43 PM   Dressing Type Tape;Transparent 9/5/2018 12:43 PM   Hub Color/Line Status Infusing;Pink 9/5/2018 12:43 PM   Alcohol Cap Used No 9/5/2018 12:43 PM       Peripheral IV 09/05/18 Left Forearm (Active)   Site Assessment Clean, dry, & intact 9/5/2018 12:43 PM   Phlebitis Assessment 0 9/5/2018 12:43 PM   Infiltration Assessment 0 9/5/2018 12:43 PM   Dressing Status Clean, dry, & intact 9/5/2018 12:43 PM   Dressing Type Transparent;Tape 9/5/2018 12:43 PM   Hub Color/Line Status Green;Capped;Flushed 9/5/2018 12:43 PM   Alcohol Cap Used No 9/5/2018 12:43 PM        Opportunity for questions and clarification was provided. Patient transported with:   O2 @ 2 liters    VTE prophylaxis orders have been written for Andrew Brown. Patient and family given floor number and nurses name. Family updated re: pt status after security code verified.

## 2018-09-05 NOTE — PERIOP NOTES
5F URETERAL CATHETER PLACED IN BOTH URETERS AT THE BEGINNING OF THE CASE AND REMOVED BY DR. JOHNSON WITHOUT DIFFICULTY AT THE END OF CASE

## 2018-09-05 NOTE — ANESTHESIA PROCEDURE NOTES
Peripheral Block Start time: 9/5/2018 11:33 AM 
End time: 9/5/2018 11:35 AM 
Performed by: Juan Yun Authorized by: Juan Yun  
 
 
Pre-procedure: Indications: at surgeon's request and post-op pain management Preanesthetic Checklist: patient identified, risks and benefits discussed, anesthesia consent given and patient being monitored Block Type:  
Block Type:  TAP (rectus sheath) Laterality:  Left Monitoring:  Standard ASA monitoring, oxygen, continuous pulse ox, frequent vital sign checks and heart rate Injection Technique:  Single shot Procedures: ultrasound guided Patient Position: supine Prep: chlorhexidine Location:  Abdominal 
Needle Type:  SonoPlex Needle Gauge:  20 G Needle Localization:  Ultrasound guidance Medication Injected:  0.25% 
ropivacaine Block epinephrine used: with 2 mg decadron. Volume (mL):  10 Assessment: 
Number of attempts:  1 Injection Assessment:  Incremental injection every 5 mL, ultrasound image on chart, no intravascular symptoms and negative aspiration for blood (no violation of peritoneal cavity) Patient tolerance:  Patient tolerated the procedure well with no immediate complications

## 2018-09-05 NOTE — ANESTHESIA PROCEDURE NOTES
Peripheral Block Start time: 9/5/2018 11:36 AM 
End time: 9/5/2018 11:38 AM 
Performed by: Andrew Lebron Authorized by: Andrew Lebron  
 
 
Pre-procedure: Indications: at surgeon's request and post-op pain management Preanesthetic Checklist: patient identified, risks and benefits discussed, anesthesia consent given and patient being monitored Block Type:  
Block Type:  TAP Laterality:  Left Monitoring:  Standard ASA monitoring, continuous pulse ox, oxygen, frequent vital sign checks and heart rate Injection Technique:  Single shot Procedures: ultrasound guided Patient Position: supine Prep: chlorhexidine Location:  Abdominal 
Needle Type:  SonoPlex Needle Gauge:  20 G Needle Localization:  Ultrasound guidance Medication Injected:  0.25% 
ropivacaine Block epinephrine used: 3 mg decadron. Volume (mL):  20 Assessment: 
Number of attempts:  1 Injection Assessment:  Incremental injection every 5 mL, ultrasound image on chart, no intravascular symptoms and negative aspiration for blood (no violation of peritoneal cavity) Patient tolerance:  Patient tolerated the procedure well with no immediate complications

## 2018-09-05 NOTE — WOUND CARE
Patient in surgery already when consult received for marking. Will loosely follow after surgery and begin teaching if colostomy was performed.

## 2018-09-05 NOTE — PROGRESS NOTES
Spiritual Care visit. Initial Visit, Pre Surgery Consult. Visit and prayer before patient goes to surgery.     Visit by Gary Roman M.Ed., Th.B. ,Staff

## 2018-09-05 NOTE — ANESTHESIA PREPROCEDURE EVALUATION
Anesthetic History PONV Review of Systems / Medical History Patient summary reviewed, nursing notes reviewed and pertinent labs reviewed Pulmonary Within defined limits Neuro/Psych Neuromuscular disease (parkinson's dz) and psychiatric history Comments: Peripheral neuropathy Cardiovascular Hypertension Exercise tolerance: <4 METS Comments: Orthostatic hypotension Echo 2015- EF 55-6-% no signif valve abnl GI/Hepatic/Renal 
  
GERD: well controlled Endo/Other Diabetes: type 2 Other Findings Physical Exam 
 
Airway Mallampati: II 
 
Neck ROM: normal range of motion Mouth opening: Normal 
 
 Cardiovascular Regular rate and rhythm,  S1 and S2 normal,  no murmur, click, rub, or gallop Dental 
No notable dental hx Pulmonary Breath sounds clear to auscultation Abdominal 
 
 
 
 Other Findings Anesthetic Plan ASA: 3 Anesthesia type: general - TAP block Post-op pain plan if not by surgeon: peripheral nerve block single Induction: Intravenous Anesthetic plan and risks discussed with: Patient and Family Plan intra-op lidocaine and ketamine with post-op TAP and rectus sheath blocks

## 2018-09-05 NOTE — ANESTHESIA PROCEDURE NOTES
Peripheral Block Start time: 9/5/2018 11:39 AM 
End time: 9/5/2018 11:41 AM 
Performed by: Kyler Chavez Authorized by: Kyler Chavez  
 
 
Pre-procedure: Indications: at surgeon's request and post-op pain management Preanesthetic Checklist: patient identified, risks and benefits discussed, anesthesia consent given and patient being monitored Block Type:  
Block Type:  TAP Laterality:  Right Monitoring:  Standard ASA monitoring, continuous pulse ox, oxygen, frequent vital sign checks and heart rate Injection Technique:  Single shot Procedures: ultrasound guided Patient Position: supine Prep: chlorhexidine Location:  Abdominal 
Needle Type:  SonoPlex Needle Gauge:  20 G Needle Localization:  Ultrasound guidance Medication Injected:  0.25% 
ropivacaine Block epinephrine used: 3 mg decadron. Volume (mL):  20 Assessment: 
Number of attempts:  1 Injection Assessment:  Incremental injection every 5 mL, ultrasound image on chart, no intravascular symptoms and negative aspiration for blood (no violation of peritoneal cavity) Patient tolerance:  Patient tolerated the procedure well with no immediate complications

## 2018-09-05 NOTE — ANESTHESIA POSTPROCEDURE EVALUATION
Post-Anesthesia Evaluation and Assessment Patient: Arpit Beth MRN: [id number]  SSN: [id number] YOB: 1950  Age: 76 y.o. Sex: female Cardiovascular Function/Vital Signs Visit Vitals  /77  Pulse 68  Temp 36.2 °C (97.2 °F)  Resp (P) 15  Wt 47.9 kg (105 lb 8 oz)  SpO2 100%  BMI 16.52 kg/m2 Patient is status post general anesthesia for Procedure(s): OPEN SIGMOID COLECTOMY WITH EEA  ANASTOMOSIS, DRAINAGE OF LOCALIZED ABSCESS, MOBILIZATION OF SPLENIC FLEXURE Cystoscopy and bilateral ureteral catheter placement. FIRST PROCEDURE. Nausea/Vomiting: None Postoperative hydration reviewed and adequate. Pain: 
Pain Scale 1: Numeric (0 - 10) (09/05/18 1243) Pain Intensity 1: 0 (09/05/18 1243) Managed Neurological Status:  
Neuro (WDL): Exceptions to WDL (09/05/18 1243) Neuro Neurologic State: Drowsy (09/05/18 1243) Orientation Level: Oriented X4 (09/05/18 1243) Cognition: Follows commands (09/05/18 1243) LUE Motor Response: Purposeful (09/05/18 1243) LLE Motor Response: Purposeful (09/05/18 1243) RUE Motor Response: Purposeful (09/05/18 1243) RLE Motor Response: Purposeful (09/05/18 1243) At baseline Mental Status and Level of Consciousness: Arousable Pulmonary Status:  
O2 Device: Nasal cannula (09/05/18 1243) Adequate oxygenation and airway patent Complications related to anesthesia: None Post-anesthesia assessment completed. No concerns Signed By: Armin Varner MD   
 September 5, 2018

## 2018-09-05 NOTE — ANESTHESIA PROCEDURE NOTES
Peripheral Block Start time: 9/5/2018 11:30 AM 
End time: 9/5/2018 11:32 AM 
Performed by: Kemar Al Authorized by: Kemar Al  
 
 
Pre-procedure: Indications: at surgeon's request and post-op pain management Preanesthetic Checklist: patient identified, risks and benefits discussed, anesthesia consent given and patient being monitored Block Type:  
Block Type:  TAP (rectus sheath) Laterality:  Right Monitoring:  Standard ASA monitoring, oxygen, continuous pulse ox, frequent vital sign checks and heart rate Injection Technique:  Single shot Procedures: ultrasound guided Patient Position: supine Prep: chlorhexidine Location:  Abdominal 
Needle Type:  SonoPlex Needle Gauge:  20 G Needle Localization:  Ultrasound guidance Medication Injected:  0.25% 
ropivacaine Block epinephrine used: with 2 mg decadron. Volume (mL):  10 Assessment: 
Number of attempts:  1 Injection Assessment:  Incremental injection every 5 mL, ultrasound image on chart, no intravascular symptoms and negative aspiration for blood (no violation of peritoneal cavity) Patient tolerance:  Patient tolerated the procedure well with no immediate complications

## 2018-09-06 LAB
ANION GAP SERPL CALC-SCNC: 8 MMOL/L (ref 7–16)
BUN SERPL-MCNC: 7 MG/DL (ref 8–23)
CALCIUM SERPL-MCNC: 8.2 MG/DL (ref 8.3–10.4)
CHLORIDE SERPL-SCNC: 104 MMOL/L (ref 98–107)
CO2 SERPL-SCNC: 28 MMOL/L (ref 21–32)
CREAT SERPL-MCNC: 0.92 MG/DL (ref 0.6–1)
ERYTHROCYTE [DISTWIDTH] IN BLOOD BY AUTOMATED COUNT: 15.3 %
GLUCOSE SERPL-MCNC: 103 MG/DL (ref 65–100)
HCT VFR BLD AUTO: 32.1 % (ref 35.8–46.3)
HGB BLD-MCNC: 10.4 G/DL (ref 11.7–15.4)
MAGNESIUM SERPL-MCNC: 1.9 MG/DL (ref 1.8–2.4)
MCH RBC QN AUTO: 25.7 PG (ref 26.1–32.9)
MCHC RBC AUTO-ENTMCNC: 32.4 G/DL (ref 31.4–35)
MCV RBC AUTO: 79.3 FL (ref 79.6–97.8)
NRBC # BLD: 0 K/UL (ref 0–0.2)
PHOSPHATE SERPL-MCNC: 3.8 MG/DL (ref 2.3–3.7)
PLATELET # BLD AUTO: 223 K/UL (ref 150–450)
PMV BLD AUTO: 11.5 FL (ref 9.4–12.3)
POTASSIUM SERPL-SCNC: 3.6 MMOL/L (ref 3.5–5.1)
RBC # BLD AUTO: 4.05 M/UL (ref 4.05–5.2)
SODIUM SERPL-SCNC: 140 MMOL/L (ref 136–145)
WBC # BLD AUTO: 12.8 K/UL (ref 4.3–11.1)

## 2018-09-06 PROCEDURE — 74011000258 HC RX REV CODE- 258: Performed by: NURSE PRACTITIONER

## 2018-09-06 PROCEDURE — 74011000302 HC RX REV CODE- 302: Performed by: SURGERY

## 2018-09-06 PROCEDURE — 77010033678 HC OXYGEN DAILY

## 2018-09-06 PROCEDURE — 77030020255 HC SOL INJ LR 1000ML BG

## 2018-09-06 PROCEDURE — 65270000029 HC RM PRIVATE

## 2018-09-06 PROCEDURE — 86580 TB INTRADERMAL TEST: CPT | Performed by: SURGERY

## 2018-09-06 PROCEDURE — 85027 COMPLETE CBC AUTOMATED: CPT

## 2018-09-06 PROCEDURE — 74011000250 HC RX REV CODE- 250: Performed by: SURGERY

## 2018-09-06 PROCEDURE — 74011250637 HC RX REV CODE- 250/637: Performed by: SURGERY

## 2018-09-06 PROCEDURE — 74011250636 HC RX REV CODE- 250/636: Performed by: SURGERY

## 2018-09-06 PROCEDURE — 80048 BASIC METABOLIC PNL TOTAL CA: CPT

## 2018-09-06 PROCEDURE — 83735 ASSAY OF MAGNESIUM: CPT

## 2018-09-06 PROCEDURE — 74011250636 HC RX REV CODE- 250/636: Performed by: NURSE PRACTITIONER

## 2018-09-06 PROCEDURE — 84100 ASSAY OF PHOSPHORUS: CPT

## 2018-09-06 PROCEDURE — 36415 COLL VENOUS BLD VENIPUNCTURE: CPT

## 2018-09-06 RX ORDER — PREGABALIN 25 MG/1
50 CAPSULE ORAL 2 TIMES DAILY
Status: DISCONTINUED | OUTPATIENT
Start: 2018-09-06 | End: 2018-09-11 | Stop reason: HOSPADM

## 2018-09-06 RX ORDER — HEPARIN SODIUM 5000 [USP'U]/ML
5000 INJECTION, SOLUTION INTRAVENOUS; SUBCUTANEOUS EVERY 12 HOURS
Status: DISCONTINUED | OUTPATIENT
Start: 2018-09-06 | End: 2018-09-11 | Stop reason: HOSPADM

## 2018-09-06 RX ORDER — DEXTROSE, SODIUM CHLORIDE, AND POTASSIUM CHLORIDE 5; .45; .15 G/100ML; G/100ML; G/100ML
25 INJECTION INTRAVENOUS CONTINUOUS
Status: DISCONTINUED | OUTPATIENT
Start: 2018-09-06 | End: 2018-09-11 | Stop reason: HOSPADM

## 2018-09-06 RX ORDER — PREGABALIN 50 MG/1
50 CAPSULE ORAL 2 TIMES DAILY
COMMUNITY
End: 2018-09-27

## 2018-09-06 RX ADMIN — CARBIDOPA AND LEVODOPA 2 TABLET: 25; 100 TABLET ORAL at 09:49

## 2018-09-06 RX ADMIN — CARBIDOPA AND LEVODOPA 2 TABLET: 25; 100 TABLET ORAL at 12:13

## 2018-09-06 RX ADMIN — HEPARIN SODIUM 5000 UNITS: 5000 INJECTION INTRAVENOUS; SUBCUTANEOUS at 21:22

## 2018-09-06 RX ADMIN — SODIUM CHLORIDE, SODIUM LACTATE, POTASSIUM CHLORIDE, AND CALCIUM CHLORIDE 75 ML/HR: 600; 310; 30; 20 INJECTION, SOLUTION INTRAVENOUS at 05:42

## 2018-09-06 RX ADMIN — CARBIDOPA AND LEVODOPA 2 TABLET: 25; 100 TABLET ORAL at 17:45

## 2018-09-06 RX ADMIN — ONDANSETRON 4 MG: 2 INJECTION INTRAMUSCULAR; INTRAVENOUS at 12:04

## 2018-09-06 RX ADMIN — ACETAMINOPHEN 1000 MG: 10 INJECTION, SOLUTION INTRAVENOUS at 15:28

## 2018-09-06 RX ADMIN — CARBIDOPA AND LEVODOPA 1 TABLET: 25; 100 TABLET, ORALLY DISINTEGRATING ORAL at 23:42

## 2018-09-06 RX ADMIN — ACETAMINOPHEN 1000 MG: 10 INJECTION, SOLUTION INTRAVENOUS at 09:36

## 2018-09-06 RX ADMIN — CARBIDOPA AND LEVODOPA 2 TABLET: 25; 100 TABLET ORAL at 02:47

## 2018-09-06 RX ADMIN — DEXTROSE MONOHYDRATE, SODIUM CHLORIDE, AND POTASSIUM CHLORIDE 75 ML/HR: 50; 4.5; 1.49 INJECTION, SOLUTION INTRAVENOUS at 10:12

## 2018-09-06 RX ADMIN — CARBIDOPA AND LEVODOPA 1 TABLET: 25; 100 TABLET, ORALLY DISINTEGRATING ORAL at 00:15

## 2018-09-06 RX ADMIN — FAMOTIDINE 20 MG: 10 INJECTION, SOLUTION INTRAVENOUS at 15:33

## 2018-09-06 RX ADMIN — TUBERCULIN PURIFIED PROTEIN DERIVATIVE 5 UNITS: 5 INJECTION, SOLUTION INTRADERMAL at 15:28

## 2018-09-06 RX ADMIN — PIPERACILLIN SODIUM,TAZOBACTAM SODIUM 3.38 G: 3; .375 INJECTION, POWDER, FOR SOLUTION INTRAVENOUS at 11:55

## 2018-09-06 RX ADMIN — PREGABALIN 50 MG: 25 CAPSULE ORAL at 10:17

## 2018-09-06 RX ADMIN — PIPERACILLIN SODIUM,TAZOBACTAM SODIUM 3.38 G: 3; .375 INJECTION, POWDER, FOR SOLUTION INTRAVENOUS at 18:27

## 2018-09-06 RX ADMIN — PREGABALIN 50 MG: 25 CAPSULE ORAL at 17:44

## 2018-09-06 RX ADMIN — ACETAMINOPHEN 1000 MG: 10 INJECTION, SOLUTION INTRAVENOUS at 00:18

## 2018-09-06 RX ADMIN — HYDROMORPHONE HYDROCHLORIDE 0.3 MG: 1 INJECTION, SOLUTION INTRAMUSCULAR; INTRAVENOUS; SUBCUTANEOUS at 03:51

## 2018-09-06 RX ADMIN — CARBIDOPA AND LEVODOPA 2 TABLET: 25; 100 TABLET ORAL at 15:14

## 2018-09-06 RX ADMIN — HEPARIN SODIUM 5000 UNITS: 5000 INJECTION INTRAVENOUS; SUBCUTANEOUS at 09:38

## 2018-09-06 RX ADMIN — CARBIDOPA AND LEVODOPA 2 TABLET: 25; 100 TABLET ORAL at 21:20

## 2018-09-06 RX ADMIN — CARBIDOPA AND LEVODOPA 2 TABLET: 25; 100 TABLET ORAL at 05:42

## 2018-09-06 RX ADMIN — ACETAMINOPHEN 1000 MG: 10 INJECTION, SOLUTION INTRAVENOUS at 23:37

## 2018-09-06 RX ADMIN — HYDROMORPHONE HYDROCHLORIDE 0.3 MG: 1 INJECTION, SOLUTION INTRAMUSCULAR; INTRAVENOUS; SUBCUTANEOUS at 10:06

## 2018-09-06 NOTE — PROGRESS NOTES
PLAN:  Await return of bowel function  Await pathology results  Await culture results--GNR so far  Diet NPO with meds  IVF D5 1/2 NS w [email or web address]  Entereg x 7 days   SCD/IS/Heparin/Pepcid for prophylactic measures  OOB and ambulate  Pain control  Monitor labs  Leave guevara for today    ASSESSMENT:  Admit Date: 9/5/2018   1 Day Post-Op  Procedure(s):  OPEN SIGMOID COLECTOMY WITH EEA  ANASTOMOSIS, DRAINAGE OF LOCALIZED ABSCESS, MOBILIZATION OF SPLENIC FLEXURE    Cystoscopy and bilateral ureteral catheter placement. FIRST PROCEDURE    Principal Problem:    Diverticulitis of large intestine with abscess (9/5/2018)    Active Problems:    Parkinsonism ([address] (8/10/2015)      Parkinson's disease ([address] ()      Neurologic orthostatic hypotension ([address] (9/6/2016)         HPI:Essence Ramirez is a 76 y.o. female who returns following EGD/colonoscopy by Dr. Emile Bolden in early August and a follow-up CT scan performed on August 20. I reviewed those reports and images from the CT scan. She has continued to have recurrent bouts of abdominal pain throughout this time. She has had multiple visits to the emergency room. She has been placed on antibiotics multiple times. It is apparent that she has chronic recurrent sigmoid diverticulitis. There may also be other things going on with her colon. The CT did show some colitis in the ascending colon. Her complaints are primarily left lower quadrant. She does have some generalized complaints as well. She was placed on Megace which seems to be increasing her appetite. She was present with a caregiver today who says that she will actually eat through the night. She was also placed on mesalamine but she is having difficulty swallowing that pill. She is currently afebrile. She reports having a bowel movement once every 2 weeks. The caregiver corrected her since said that she is actually having fairly regular bowel movements almost daily this week. She is taking MiraLAX.   She is still complaining of hard stools and may need to adjust that dosing.     I had seen her in January 2018 in the office for 4 week follow-up of her complicated diverticulitis. I also saw her in December 2017. A few days after that visit she went to the emergency department for continued complaints of abdominal pain. A follow-up CT scan was performed which shows complete resolution of her diverticulitis and abscess cavity. Her all of her blood work was normal.  She was supposed to have an appointment with Dr. Catalino Murillo last week but this was canceled due to the snowy conditions. She will set up a repeat appointment with him. She still has many aches and pains. These appear to be chronic. I reviewed her new CT scan and attached the corresponding image from prior image with abscess. This shows resolution and only chronic other findings.     On last visit in December 2017, she comes in not feeling well. She complains of loss of appetite, nausea, abdominal and chest pain. She was seen by Dr. Catalino Murillo within the past 2 days. I was able to speak with him as well. He ordered blood work which was all without abnormality. He did order a repeat CT scan which will be performed next week. If that scan has remained normalized then he will proceed with colonoscopy. She denies fevers but she does report some chills. She says she has lost additional weight. Her weight today is 111 pounds and was 114 pounds 4 weeks ago. She claims her normal weight is 135 pounds. When I saw her frequent 4 weeks ago she had just started a 30 day course of Cipro and Flagyl. She claims that she started developing some rash and swelling 2 days ago and stopped the antibiotics at that time but should have nearly completed that course.     She was referred by Dr. [facility] of [facility] for evaluation of diverticulitis with peridiverticular abscess.   Patient is very pleasant woman who presents with a caregiver who she uses due to Parkinson's disease. She moved from [place] in 2015. She was diagnosed with acute diverticulitis with peridiverticular abscess on September 15, 2017. This was at [facility] and she was managed with antibiotics. The originally planned to place a percutaneous drain but that was not required. I do not have access to that CT scan. She had follow-up CT scans one month later on October 16 and then again more recently on October 27. Both of those studies showed continued improvement of the size of the peridiverticular abscess which originally was close to 3 cm then decreased to 1.6 cm and now on the recent study is 1.1 cm and may actually represent a large diverticulum or a resolving abscess. There is very little evidence of residual inflammation. She continues to have some abdominal symptoms but she is not having any fevers or severe abdominal pain. She has chronic constipation and does take a dose of MiraLAX per day. She still has very hard balls for stool. She has not seen any ribbon stools because of these ball-like stools. She had been on Augmentin prior to recent visit with Dr. Humera Barrera. He saw her 2 days ago on November 15 and change her antibiotics from Augmentin to Cipro and Flagyl. He placed her on a 30 day regimen. In further questioning she is taking medications with her MiraLAX. I discussed with her that this may actually affect the absorption of any of her medications as the water bound to MiraLAX is a reversible and must exit the body via the anus. This will frequently capture medications as well and that practice of taking any medications within one half hour of MiraLAX is discouraged. She has one prior episode in her history of diverticulitis. This was in 2015 weeks she was still living in [place]. Similarly she had a peridiverticular abscess that did not require drainage and was treated with antibiotics. Her last colonoscopy was in 2014 or 2015 prior to her episode of diverticulitis.   She was noted to have diverticulosis at that time according to her. She has had upper endoscopies type Dr. Deena Borjas recently. These were unremarkable. I have believe he is planning on doing a colonoscopy once there is resolution of her diverticulitis. My usual recommendation is no sooner than 6 weeks to 3 months following resolution. This is done sooner if there is a suspicion of malignancy. Her abdominal surgical history is fairly insignificant. She has had a laparoscopic cholecystectomy as well as bilateral tubal ligation. She has not had any colon procedures.     She has noted allergies to oral and IV iodinated contrast as well as metronidazole. All of these have been used recently or currently and seem to be without issue. SUBJECTIVE:  9/6/18: POD1: Awake in bed. Reports adequate pain control. WBC 12.8, Lytes stable. AF, VSS. 1.7L UOP. -flatus. Intake/Output Summary (Last 24 hours) at 09/06/18 0810  Last data filed at 09/06/18 0735   Gross per 24 hour   Intake             3232 ml   Output             2200 ml   Net             1032 ml     OBJECTIVE:  Constitutional: Alert oriented cooperative patient in no acute distress; appears stated age   Visit Vitals    /67    Pulse 69    Temp 97.9 °F (36.6 °C)    Resp 16    Wt 105 lb 8 oz (47.9 kg)    SpO2 98%    BMI 16.52 kg/m2     Eyes:Sclera are clear. ENMT: no external lesions gross hearing normal; no obvious neck masses, no ear or lip lesions  CV: RRR. Normal perfusion  Resp: No JVD. Breathing is  non-labored; no audible wheezing. GI: soft and non-distended, aquacel dressing c/d/i  : Collado patent     Musculoskeletal: unremarkable with normal function. No embolic signs or cyanosis.    Neuro:  Oriented; moves all 4; no focal deficits  Psychiatric: normal affect and mood, no memory impairment      Patient Vitals for the past 24 hrs:   BP Temp Pulse Resp SpO2   09/06/18 0735 150/67 97.9 °F (36.6 °C) 69 16 98 %   09/06/18 0414 150/71 98.4 °F (36.9 °C) 60 16 98 %   09/05/18 2308 142/65 97.9 °F (36.6 °C) 60 16 92 %   09/05/18 1943 154/65 98.1 °F (36.7 °C) 63 16 98 %   09/05/18 1538 184/75 97.9 °F (36.6 °C) 74 15 100 %   09/05/18 1458 - - 76 14 100 %   09/05/18 1452 183/82 - 71 15 100 %   09/05/18 1447 - - 73 16 100 %   09/05/18 1433 158/82 - 74 15 100 %   09/05/18 1417 - - - 16 -   09/05/18 1413 167/78 - 73 14 100 %   09/05/18 1401 - - 75 16 100 %   09/05/18 1353 168/76 - 68 17 100 %   09/05/18 1351 - - 67 16 100 %   09/05/18 1333 172/76 - 69 18 100 %   09/05/18 1313 177/77 - 69 16 100 %   09/05/18 1253 179/77 - 68 14 100 %   09/05/18 1248 186/73 - 69 15 100 %   09/05/18 1243 163/72 - 69 16 100 %   09/05/18 1238 173/72 - 69 15 100 %   09/05/18 1233 178/82 - 68 14 100 %   09/05/18 1228 180/83 - 70 17 100 %   09/05/18 1223 181/70 - 71 16 100 %   09/05/18 1218 182/81 - 77 18 100 %   09/05/18 1213 191/84 - 81 16 100 %   09/05/18 1208 198/73 - 81 14 100 %   09/05/18 1204 199/90 97.2 °F (36.2 °C) 80 12 100 %   09/05/18 1202 (!) 217/88 - 78 - 100 %     Labs:  Recent Labs      09/06/18   0448   WBC  12.8*   HGB  10.4*   PLT  223   NA  140   K  3.6   CL  104   CO2  28   BUN  7*   CREA  0.92   GLU  103*       Signed:  Kim Angle, NP

## 2018-09-06 NOTE — ROUTINE PROCESS
END OF SHIFT NOTE:    INTAKE/OUTPUT  09/05 0701 - 09/06 0700  In: 2886 [I.V.:2886]  Out: 1800 [Urine:1750]  Voiding: YES  Catheter: YES  Drain:              Flatus: Patient does not have flatus present. Stool:  0 occurrences. Characteristics:       Emesis: 0 occurrences. Characteristics:        VITAL SIGNS  Patient Vitals for the past 12 hrs:   Temp Pulse Resp BP SpO2   09/06/18 0414 98.4 °F (36.9 °C) 60 16 150/71 98 %   09/05/18 2308 97.9 °F (36.6 °C) 60 16 142/65 92 %   09/05/18 1943 98.1 °F (36.7 °C) 63 16 154/65 98 %       Pain Assessment  Pain Intensity 1: 10 (09/06/18 0350)  Pain Location 1: Back, Abdomen, Chest, Shoulder  Pain Intervention(s) 1: Medication (see MAR)  Patient Stated Pain Goal: 0    Ambulating  No    Shift report given to oncoming nurse at the bedside.     Sivakumar Ghosh LPN

## 2018-09-06 NOTE — PROGRESS NOTES
Spoke to Ms. Trujillo in room 201 about discharge planning. She is POD#1 colectomy. She lives alone in [address] senior housing ($198 per month) and has a CLTC aide (but her face sheet says Medicare and [employer], not [place] Medicaid). Called her CLTC  Ms. Martha Brooks at [phone] and left message. She has a sister Ms. Marely Wong in [place], [place], and a daughter Ad Encinas in [place], [place] ([phone]). Discussed STR at a SNF versus home health. Gave Ms. Stuart Mac the SNF list for possible STR for her review. She says she does not want to go to [facility], but maybe to [facility]. Await PT evaluation and recommendation, and then if needed, send referrals (e.g., [facility]). Care Management Interventions  Plan discussed with Pt/Family/Caregiver:  Yes

## 2018-09-06 NOTE — OP NOTES
[facility] [address], [address] [facility]  [phone]    OPERATVE REPORT    Name: Jonathan Albrecht     Date of Surgery: 9/5/2018  Med Record Number: [id number]   Age: 76 y.o. Sex: female   Preoperative Diagnosis: Diverticulitis of intestine without bleeding, unspecified complication status, unspecified part of intestinal tract [K57.92]  Postoperative Diagnosis: Diverticulitis of sigmoid colon with peridiverticular abscess    Procedure(s):  OPEN SIGMOID COLECTOMY WITH EEA  ANASTOMOSIS, DRAINAGE OF LOCALIZED ABSCESS, MOBILIZATION OF SPLENIC FLEXURE    Cystoscopy and bilateral ureteral catheter placement  Surgeon(s) and Role:  Panel 1:     * Lovely Choudhury MD - Primary     Panel 2:     * Cleveland Ulirch MD - Primary      Surgical Assistant: none     Surgical Staff:  Circ-1: Robert Bello RN  Circ-Relief: Amaury High RN  Scrub Tech-2: Too Roy;  Bebo Multani  Event Time In   Incision Start 6598   Incision Close 1130      Anesthesia: General   Estimated Blood Loss: ~50 cc  Specimens:   ID Type Source Tests Collected by Time Destination   1 : SIGMOIID COLON Fresh Colon   Lovely Choudhury MD 9/5/2018 1011 Pathology   1 : INTRAPERITONEAL ABSCESS Body Fluid Abscess CULTURE, ANAEROBIC, CULTURE, BODY FLUID Lovely Choudhury MD 9/5/2018 0945 Microbiology       Findings: ureteral catheters easily placed with large flow from L kidney (obstructed), adhesions to sigmoid colon in LLQ from inflammatory process, no peritonitis, adequate exposure using large Justin and handheld retractors, foreshortened sigmoid colon densely adherent to L lateral side wall after freeing filmy adhesions which uncovered small volume abscess, culture obtained and abscess fully evacuated and drained without gross contamination, sigmoid and proximal rectum divided, L colon at transection without mobility to reach pelvis and splenic flexure mobilized completely creating tension free length, 28 mm EEA sized and EEA coloproctostomy performed at ~12 cm from anus, excellent donuts and - insufflation leak test.  Ureteral catheters removed with intact tips. Anesthesia placed B TAP blocks at procedure end. Complications: none apparent  Implants: * No implants in log *      Procedure Description:   The risks, benefits, potential complications, treatment options, and expected outcomes were discussed with the patient pre-operatively. The patient voiced understanding and gave informed consent preoperatively. The patient was taken to the Operating Room, and the [facility] time-out protocol checklist was followed. After the induction of adequate anesthesia, the patient was placed on a pink foam padding and in lithotomy position using Neal stirrups. Her perineum was prepped and draped for the urology portion of the procedure. Bilateral ureteral catheters were placed by Dr. Tamica Dillard and covered under separate dictation. There appeared to be large volume effluent coming from the left kidney. Possibility of partial ureteral obstruction is considered. Collado catheter was placed and ureteral catheters were secured. The patient was then reprepped and redraped for the abdominal and perineal portions of the surgery. The patient had a prior incision scar in the lower midline. A lower midline incision was made using #10 scalpel from just to the right of the umbilicus down to the pubis. Underlying tissues were divided with the monopolar electrosurgical energy device. The midline was opened and the peritoneal cavity was entered under direct vision. The measurement of the incision was 15 cm. After initial exploration the large Justin retractor/wound protector was placed. The remaining procedure was able to be performed with Justin retraction and additional hand-held retractors only. The patient was then. A very thin veil of omentum was present and adherent to the left lower quadrant where an inflammatory mass was palpable. The omental adhesions were lysed using electrosurgical dissection. The inflammatory mass was the sigmoid colon which was again densely adherent to the left gutter up. The distal descending colon and the rectum were soft. The abdomen was explored. The cecum was not fixed. There was no evidence of an appendix. The stomach was soft. The liver was normal.  The small bowel was normal.  There are no small bowel adhesions. The uterus contained several fibroids. The ovaries were atrophic and normal.  The sigmoid colon was mobilized from the sidewall. Adhesions were lysed and in  a very desmoplastic reactive area a pocket of purulence was entered. This white creamy pus. Similar to endoscopic photos recently taken by Dr. Chong Alonzo. This area was cultured up. There was no gross spillage. The area was then evacuated and this allowed for complete separation of the sigmoid colon from the lateral sidewall. The mesentery was fortunately not shortened or indurated. A soft spot in the distal descending colon was identified and circumferentially dissected out. The contour stapler was used to divide the colon at this location. Division of the sigmoid mesentery was then carried out distally from this point without taking the mesentery deep as this was a non-cancer operation. The ureteral catheters were palpable and dissection was carried well away from the ureters. A site in the proximal rectum was identified and circumferentially dissected. This would be the site for the distal transection. The rectum was soft and normal.  The LigaSure device was used to divide and ligate the mesentery. The mesentery was divided into the proximal mesorectum. The peritoneum was incised as well to increased mobility of the rectum for future EEA anastomosis. The proximal rectum was then transected with another fire of the contour stapler. The green cartridges were used for both fires.   The rectal firing was oriented with the concave portion distal to create a rounded rectal stump. Specimen was removed from the field. Gloves were changed. The abdomen was then irrigated. Ureters were reexamined and confirmed to be without injury. Urine remained clear. The transected end of the descending colon did not have adequate tension-free length to perform an EEA anastomosis. The splenic flexure was therefore mobilized. Patient was placed in reverse Trendelenburg position and rotated towards the patient's right and dissection of the lateral attachments was carried out using LigaSure device. Division of the distal gastrocolic ligament and splenocolic ligaments was carried out to create mobilization. No additional mesentery required division as there was now adequate length to easily reach the pelvis without tension. The patient would periodically undergo colonic spasm where the colon became ropelike as if receiving neostigmine. Fortunately these were temporary and did not occur during the anastomosis or placement of the EEA anvil. The pursestring device was used to place a pursestring in the descending colon. The 25 mm and 28 mm EEA sizers easily passed into this segment. The 28 mm EEA stapler was selected and the anvil was secured by the pursestring. Stay sutures of 2-0 silk were placed on either end of the staple line on the rectal stump. I then went below and after gentle dilation of the sphincters the 25 mm and 28 mm EEA sizers were easily passed to the apex of the rectal stump. The anastomosis would be approximately 12 cm from the anal verge. The 28 mm EEA stapler was then passed to the apex of the stump and the spike was advanced through the apex at the mid of the staple line. The stapler was coupled with the anvil and the stapler fired once in position. This was performed using the usual steps.   Once the stapler was removed the doughnuts were inspected and were intact and robust.  Submersion insufflation leak test was performed and no bubbles were produced. The anastomosis and colon and rectum distended nicely. I changed gown and gloves and returned to the operative field. The stay sutures from the rectal stump were removed after securing. A retraction suture of 0 Vicryl had been placed in the uterus and this was also removed. The anastomosed colon and rectum lie nicely in the left gutter. The mesentery was secured to the peritoneum to eliminate internal herniation and maintain tension off of the anastomosis. There was no need for leaving a drain. The viscera were replaced into normal position. The omentum was draped over the viscera. Palpation to the nasogastric tube did not reveal placement within the stomach. The stomach was decompressed. The nasogastric tube was removed. After confirmation of correct counts, the abdomen was then closed. The peritoneum was closed from the pubis to the arcuate line using running 0 Vicryl suture. The midline was then closed using 2 X 0 PDS looped continuous sutures. Subcutaneous tissues were irrigated and the skin was closed with skin staples. An Aquacel Ag/DuoDERM combo dressing was placed over the incision. The ureteral catheters were removed intact. The Collado catheter was secured to the thigh. Anesthesia then performed bilateral TAP blocks. All sponge, instruments, and needle counts were correct. The patient was taken to recovery in good condition.     Jenna Ritter MD, FACS

## 2018-09-06 NOTE — PROGRESS NOTES
Dr Sandra Rangel called. Pt states she takes her Parkinson's medication 2- 7 times a day and one at midnight.   Order received

## 2018-09-06 NOTE — PROGRESS NOTES
Upon entering patient room I observed patient with a large home medication bottle of carbidopa levodopa in her hands with the lid off and pills in her hand. After instructing the patient not to take the medication she proceeded to ingest an unknown quantity of pill. I immediately went across the daniel and obtained [employer] and returned to the patient's room. The pill bottle was retrieved and patient stated she only ingested 1/2 of a pill. The remaining half was in her hand and she returned it to the bottle.

## 2018-09-07 LAB
ANION GAP SERPL CALC-SCNC: 7 MMOL/L (ref 7–16)
BUN SERPL-MCNC: 6 MG/DL (ref 8–23)
CALCIUM SERPL-MCNC: 8.1 MG/DL (ref 8.3–10.4)
CHLORIDE SERPL-SCNC: 108 MMOL/L (ref 98–107)
CO2 SERPL-SCNC: 29 MMOL/L (ref 21–32)
CREAT SERPL-MCNC: 0.87 MG/DL (ref 0.6–1)
ERYTHROCYTE [DISTWIDTH] IN BLOOD BY AUTOMATED COUNT: 15.6 %
GLUCOSE SERPL-MCNC: 83 MG/DL (ref 65–100)
HCT VFR BLD AUTO: 32.2 % (ref 35.8–46.3)
HGB BLD-MCNC: 10.3 G/DL (ref 11.7–15.4)
MAGNESIUM SERPL-MCNC: 2.1 MG/DL (ref 1.8–2.4)
MCH RBC QN AUTO: 25.9 PG (ref 26.1–32.9)
MCHC RBC AUTO-ENTMCNC: 32 G/DL (ref 31.4–35)
MCV RBC AUTO: 80.9 FL (ref 79.6–97.8)
NRBC # BLD: 0 K/UL (ref 0–0.2)
PHOSPHATE SERPL-MCNC: 2.6 MG/DL (ref 2.3–3.7)
PLATELET # BLD AUTO: 228 K/UL (ref 150–450)
PMV BLD AUTO: 11.8 FL (ref 9.4–12.3)
POTASSIUM SERPL-SCNC: 3.4 MMOL/L (ref 3.5–5.1)
RBC # BLD AUTO: 3.98 M/UL (ref 4.05–5.2)
SODIUM SERPL-SCNC: 144 MMOL/L (ref 136–145)
WBC # BLD AUTO: 9.4 K/UL (ref 4.3–11.1)

## 2018-09-07 PROCEDURE — 74011250636 HC RX REV CODE- 250/636: Performed by: NURSE PRACTITIONER

## 2018-09-07 PROCEDURE — 80048 BASIC METABOLIC PNL TOTAL CA: CPT

## 2018-09-07 PROCEDURE — 85027 COMPLETE CBC AUTOMATED: CPT

## 2018-09-07 PROCEDURE — 65270000029 HC RM PRIVATE

## 2018-09-07 PROCEDURE — 97161 PT EVAL LOW COMPLEX 20 MIN: CPT

## 2018-09-07 PROCEDURE — 84100 ASSAY OF PHOSPHORUS: CPT

## 2018-09-07 PROCEDURE — 83735 ASSAY OF MAGNESIUM: CPT

## 2018-09-07 PROCEDURE — 74011250637 HC RX REV CODE- 250/637: Performed by: SURGERY

## 2018-09-07 PROCEDURE — 74011000258 HC RX REV CODE- 258: Performed by: NURSE PRACTITIONER

## 2018-09-07 PROCEDURE — 74011250636 HC RX REV CODE- 250/636: Performed by: SURGERY

## 2018-09-07 PROCEDURE — 74011000250 HC RX REV CODE- 250: Performed by: SURGERY

## 2018-09-07 PROCEDURE — 36415 COLL VENOUS BLD VENIPUNCTURE: CPT

## 2018-09-07 PROCEDURE — 97110 THERAPEUTIC EXERCISES: CPT

## 2018-09-07 RX ORDER — OXYCODONE AND ACETAMINOPHEN 5; 325 MG/1; MG/1
1 TABLET ORAL
Status: DISCONTINUED | OUTPATIENT
Start: 2018-09-07 | End: 2018-09-11 | Stop reason: HOSPADM

## 2018-09-07 RX ORDER — POTASSIUM CHLORIDE 14.9 MG/ML
20 INJECTION INTRAVENOUS
Status: COMPLETED | OUTPATIENT
Start: 2018-09-07 | End: 2018-09-07

## 2018-09-07 RX ADMIN — FAMOTIDINE 20 MG: 10 INJECTION, SOLUTION INTRAVENOUS at 15:16

## 2018-09-07 RX ADMIN — ALPRAZOLAM 0.5 MG: 0.5 TABLET ORAL at 16:58

## 2018-09-07 RX ADMIN — POTASSIUM CHLORIDE 20 MEQ: 14.9 INJECTION, SOLUTION INTRAVENOUS at 09:29

## 2018-09-07 RX ADMIN — PIPERACILLIN SODIUM,TAZOBACTAM SODIUM 3.38 G: 3; .375 INJECTION, POWDER, FOR SOLUTION INTRAVENOUS at 03:59

## 2018-09-07 RX ADMIN — ONDANSETRON 4 MG: 2 INJECTION INTRAMUSCULAR; INTRAVENOUS at 04:12

## 2018-09-07 RX ADMIN — CARBIDOPA AND LEVODOPA 2 TABLET: 25; 100 TABLET ORAL at 07:18

## 2018-09-07 RX ADMIN — POTASSIUM CHLORIDE 20 MEQ: 14.9 INJECTION, SOLUTION INTRAVENOUS at 10:42

## 2018-09-07 RX ADMIN — PIPERACILLIN SODIUM,TAZOBACTAM SODIUM 3.38 G: 3; .375 INJECTION, POWDER, FOR SOLUTION INTRAVENOUS at 10:41

## 2018-09-07 RX ADMIN — ONDANSETRON 4 MG: 2 INJECTION INTRAMUSCULAR; INTRAVENOUS at 22:32

## 2018-09-07 RX ADMIN — ACETAMINOPHEN 1000 MG: 10 INJECTION, SOLUTION INTRAVENOUS at 07:17

## 2018-09-07 RX ADMIN — CARBIDOPA AND LEVODOPA 2 TABLET: 25; 100 TABLET ORAL at 15:17

## 2018-09-07 RX ADMIN — ALPRAZOLAM 0.5 MG: 0.5 TABLET ORAL at 01:51

## 2018-09-07 RX ADMIN — MEGESTROL ACETATE 200 MG: 40 SUSPENSION ORAL at 07:19

## 2018-09-07 RX ADMIN — CARBIDOPA AND LEVODOPA 2 TABLET: 25; 100 TABLET ORAL at 06:16

## 2018-09-07 RX ADMIN — PIPERACILLIN SODIUM,TAZOBACTAM SODIUM 3.38 G: 3; .375 INJECTION, POWDER, FOR SOLUTION INTRAVENOUS at 17:34

## 2018-09-07 RX ADMIN — PREGABALIN 50 MG: 25 CAPSULE ORAL at 07:18

## 2018-09-07 RX ADMIN — ACETAMINOPHEN 1000 MG: 10 INJECTION, SOLUTION INTRAVENOUS at 15:17

## 2018-09-07 RX ADMIN — CARBIDOPA AND LEVODOPA 2 TABLET: 25; 100 TABLET ORAL at 17:34

## 2018-09-07 RX ADMIN — MIDODRINE HYDROCHLORIDE 2.5 MG: 5 TABLET ORAL at 07:18

## 2018-09-07 RX ADMIN — CARBIDOPA AND LEVODOPA 2 TABLET: 25; 100 TABLET ORAL at 11:29

## 2018-09-07 RX ADMIN — POTASSIUM CHLORIDE 20 MEQ: 14.9 INJECTION, SOLUTION INTRAVENOUS at 11:29

## 2018-09-07 RX ADMIN — HEPARIN SODIUM 5000 UNITS: 5000 INJECTION INTRAVENOUS; SUBCUTANEOUS at 21:00

## 2018-09-07 RX ADMIN — CARBIDOPA AND LEVODOPA 2 TABLET: 25; 100 TABLET ORAL at 22:04

## 2018-09-07 RX ADMIN — ONDANSETRON 4 MG: 2 INJECTION INTRAMUSCULAR; INTRAVENOUS at 09:29

## 2018-09-07 RX ADMIN — PREGABALIN 50 MG: 25 CAPSULE ORAL at 16:58

## 2018-09-07 RX ADMIN — HEPARIN SODIUM 5000 UNITS: 5000 INJECTION INTRAVENOUS; SUBCUTANEOUS at 07:19

## 2018-09-07 NOTE — PROGRESS NOTES
Nutrition:  Acknowledge RN-generated Nutrition Consult for diet education (patient requesting information about diet restrictions). EMR reviewed. The patient was admitted with no acute nutrition risk factors. History of Parkinson's disease and diabetes noted. Gluten and milk allergies are noted. Discharge to SNF possible on Monday. The patient is post-op day 1 sigmoid colectomy and drainage of abscess. Current diet is clear liquids post op. Expect that information desired would not be able to be covered in time available today. Will defer visit until Monday. Murray Villalba.  Tami Butts  [phone]

## 2018-09-07 NOTE — PROGRESS NOTES
PLAN:  Start CLD  Pathology noted  Await sensitivities-- cultures + e. Coli; on Zosyn  Continue IVF D5 1/2 NS w [facility]  Replace K+  Entereg x 7 days   SCD/IS/Heparin/Pepcid for prophylactic measures  On PTA meds for Parkinsons   OOB and ambulate  Pain control  Monitor labs  D/C Collado  Plan for SNF Monday    ASSESSMENT:  Admit Date: 9/5/2018   2 Day Post-Op  Procedure(s):  OPEN SIGMOID COLECTOMY WITH EEA  ANASTOMOSIS, DRAINAGE OF LOCALIZED ABSCESS, MOBILIZATION OF SPLENIC FLEXURE    Cystoscopy and bilateral ureteral catheter placement. FIRST PROCEDURE    Principal Problem:    Diverticulitis of large intestine with abscess (9/5/2018)    Active Problems:    Parkinsonism ([address] (8/10/2015)      Parkinson's disease ([address] ()      Neurologic orthostatic hypotension ([address] (9/6/2016)         HPI:Essence Bonilla is a 76 y.o. female who returns following EGD/colonoscopy by Dr. Nina Garland in early August and a follow-up CT scan performed on August 20. I reviewed those reports and images from the CT scan. She has continued to have recurrent bouts of abdominal pain throughout this time. She has had multiple visits to the emergency room. She has been placed on antibiotics multiple times. It is apparent that she has chronic recurrent sigmoid diverticulitis. There may also be other things going on with her colon. The CT did show some colitis in the ascending colon. Her complaints are primarily left lower quadrant. She does have some generalized complaints as well. She was placed on Megace which seems to be increasing her appetite. She was present with a caregiver today who says that she will actually eat through the night. She was also placed on mesalamine but she is having difficulty swallowing that pill. She is currently afebrile. She reports having a bowel movement once every 2 weeks. The caregiver corrected her since said that she is actually having fairly regular bowel movements almost daily this week.   She is taking MiraLAX. She is still complaining of hard stools and may need to adjust that dosing.     I had seen her in January 2018 in the office for 4 week follow-up of her complicated diverticulitis. I also saw her in December 2017. A few days after that visit she went to the emergency department for continued complaints of abdominal pain. A follow-up CT scan was performed which shows complete resolution of her diverticulitis and abscess cavity. Her all of her blood work was normal.  She was supposed to have an appointment with Dr. Heber Adams last week but this was canceled due to the snowy conditions. She will set up a repeat appointment with him. She still has many aches and pains. These appear to be chronic. I reviewed her new CT scan and attached the corresponding image from prior image with abscess. This shows resolution and only chronic other findings.     On last visit in December 2017, she comes in not feeling well. She complains of loss of appetite, nausea, abdominal and chest pain. She was seen by Dr. Heber Adams within the past 2 days. I was able to speak with him as well. He ordered blood work which was all without abnormality. He did order a repeat CT scan which will be performed next week. If that scan has remained normalized then he will proceed with colonoscopy. She denies fevers but she does report some chills. She says she has lost additional weight. Her weight today is 111 pounds and was 114 pounds 4 weeks ago. She claims her normal weight is 135 pounds. When I saw her frequent 4 weeks ago she had just started a 30 day course of Cipro and Flagyl. She claims that she started developing some rash and swelling 2 days ago and stopped the antibiotics at that time but should have nearly completed that course.     She was referred by Dr. Heber [facility] of [facility] for evaluation of diverticulitis with peridiverticular abscess.   Patient is very pleasant woman who presents with a caregiver who she uses due to Parkinson's disease. She moved from [place] in 2015. She was diagnosed with acute diverticulitis with peridiverticular abscess on September 15, 2017. This was at [facility] and she was managed with antibiotics. The originally planned to place a percutaneous drain but that was not required. I do not have access to that CT scan. She had follow-up CT scans one month later on October 16 and then again more recently on October 27. Both of those studies showed continued improvement of the size of the peridiverticular abscess which originally was close to 3 cm then decreased to 1.6 cm and now on the recent study is 1.1 cm and may actually represent a large diverticulum or a resolving abscess. There is very little evidence of residual inflammation. She continues to have some abdominal symptoms but she is not having any fevers or severe abdominal pain. She has chronic constipation and does take a dose of MiraLAX per day. She still has very hard balls for stool. She has not seen any ribbon stools because of these ball-like stools. She had been on Augmentin prior to recent visit with Dr. Emile Bolden. He saw her 2 days ago on November 15 and change her antibiotics from Augmentin to Cipro and Flagyl. He placed her on a 30 day regimen. In further questioning she is taking medications with her MiraLAX. I discussed with her that this may actually affect the absorption of any of her medications as the water bound to MiraLAX is a reversible and must exit the body via the anus. This will frequently capture medications as well and that practice of taking any medications within one half hour of MiraLAX is discouraged. She has one prior episode in her history of diverticulitis. This was in 2015 weeks she was still living in [place]. Similarly she had a peridiverticular abscess that did not require drainage and was treated with antibiotics.   Her last colonoscopy was in 2014 or 2015 prior to her episode of diverticulitis. She was noted to have diverticulosis at that time according to her. She has had upper endoscopies type Dr. Jose Francisco Berrios recently. These were unremarkable. I have believe he is planning on doing a colonoscopy once there is resolution of her diverticulitis. My usual recommendation is no sooner than 6 weeks to 3 months following resolution. This is done sooner if there is a suspicion of malignancy. Her abdominal surgical history is fairly insignificant. She has had a laparoscopic cholecystectomy as well as bilateral tubal ligation. She has not had any colon procedures.     She has noted allergies to oral and IV iodinated contrast as well as metronidazole. All of these have been used recently or currently and seem to be without issue. SUBJECTIVE:  9/6/18: POD1: Awake in bed. Reports adequate pain control. WBC 12.8, Lytes stable. AF, VSS. 1.7L UOP. -flatus. 9/7/18 POD 2: Awake, alert, + flatus, -BM. -N/V.  WBC 9.4, K+ 3.4. AF, VSS. Cultures and path have returned. Intake/Output Summary (Last 24 hours) at 09/07/18 1100  Last data filed at 09/07/18 0853   Gross per 24 hour   Intake             1219 ml   Output             4900 ml   Net            -3681 ml     OBJECTIVE:  Constitutional: Alert oriented cooperative patient in no acute distress; appears stated age   Visit Vitals    /54    Pulse (!) 57    Temp 98.5 °F (36.9 °C)    Resp 17    Wt 105 lb 8 oz (47.9 kg)    SpO2 97%    BMI 16.52 kg/m2     Eyes:Sclera are clear. ENMT: no external lesions gross hearing normal; no obvious neck masses, no ear or lip lesions  CV: Normal perfusion  Resp: No JVD. Breathing is  non-labored; no audible wheezing. GI: soft and non-distended, aquacel dressing c/d/i  : Collado patent     Musculoskeletal: unremarkable with normal function. No embolic signs or cyanosis.    Neuro:  Oriented; moves all 4; no focal deficits  Psychiatric: normal affect and mood, no memory impairment      Patient Vitals for the past 24 hrs:   BP Temp Pulse Resp SpO2   09/07/18 0709 115/54 98.5 °F (36.9 °C) (!) 57 17 97 %   09/07/18 0410 135/67 98.8 °F (37.1 °C) 65 18 100 %   09/06/18 2344 131/65 98.6 °F (37 °C) 78 18 100 %   09/06/18 1946 125/55 98.3 °F (36.8 °C) 66 18 99 %   09/06/18 1510 139/60 97.9 °F (36.6 °C) 66 18 98 %   09/06/18 1110 133/67 98.1 °F (36.7 °C) 69 18 98 %     Labs:    Recent Labs      09/07/18 0411   WBC  9.4   HGB  10.3*   PLT  228   NA  144   K  3.4*   CL  108*   CO2  29   BUN  6*   CREA  0.87   GLU  83       Signed:  ELVIS Willingham

## 2018-09-07 NOTE — PROGRESS NOTES
Pt has rested at intervals this 4749-1310 shift. Co nausea, medicated per md orders see mar. ivf infusing without diff.  scds intact ble. sr up x3, bed lwoered and locked, and call light within reach.

## 2018-09-07 NOTE — PROGRESS NOTES
CM met with patient to discuss d/c plan. Patient requested a referral to [facility]. Referral have been completed.

## 2018-09-07 NOTE — PROGRESS NOTES
Problem: Mobility Impaired (Adult and Pediatric)  Goal: *Acute Goals and Plan of Care (Insert Text)  LTG:  (1.)Ms. Yuliana Smith will move from supine to sit and sit to supine , scoot up and down and roll side to side in bed with MODIFIED INDEPENDENCE within 7 treatment day(s). (2.)Ms. Yuliana Smith will transfer from bed to chair and chair to bed with MODIFIED INDEPENDENCE using the least restrictive device within 7 treatment day(s). (3.)Ms. Yuliana Smith will ambulate with MODIFIED INDEPENDENCE for 250 feet with the least restrictive device within 7 treatment day(s). (4.)Ms. Yuliana Smith will participate in therapeutic activity/exerices x 23 minutes for increased strength within 7 days. ________________________________________________________________________________________________      PHYSICAL THERAPY: Initial Assessment, Treatment Day: Day of Assessment, AM 9/7/2018  INPATIENT: Hospital Day: 3  [place] / Product Type: Medicare /      NAME/AGE/GENDER: Rin Reyes is a 76 y.o. female   PRIMARY DIAGNOSIS: Diverticulitis of intestine without bleeding, unspecified complication status, unspecified part of intestinal tract [K57.92] Diverticulitis of large intestine with abscess Diverticulitis of large intestine with abscess  Procedure(s) (LRB):  OPEN SIGMOID COLECTOMY WITH EEA  ANASTOMOSIS, DRAINAGE OF LOCALIZED ABSCESS, MOBILIZATION OF SPLENIC FLEXURE  (N/A)   Cystoscopy and bilateral ureteral catheter placement. FIRST PROCEDURE (Bilateral)  2 Days Post-Op  ICD-10: Treatment Diagnosis:    · Generalized Muscle Weakness (M62.81)  · Other abnormalities of gait and mobility (R26.89)  · History of falling (Z91.81)   Precaution/Allergies:  Flagyl [metronidazole];  Aspirin; Contrast agent [iodine]; Gabapentin; Gluten; Milk containing products; Morphine; Omnipaque rediflo [iohexol]; and Red dye      ASSESSMENT:     Ms. Yuliana Smith is a 76 y.o. female in the hospital for the above who was supine in bed upon arrival.  Pt reports that she lives in a private residence by herself with an aide that comes for several hours every day. Pt also reported that PTA she was getting assistance with ADLs and ambulated with RW. Pt admitted to 3 recent falls in the past year. Ms. Quispe presents to PT with [facility] and decreased strength in B LEs. During evaluation pt performed supervision and intact sitting balance. Pt also ambulated short distance to recliner with [facility] and RW. Her standing balance appears fair. Ms. Quispe could benefit from skilled PT as she is currently functioning below her baseline. This section established at most recent assessment   PROBLEM LIST (Impairments causing functional limitations):  1. Decreased Strength  2. Decreased Transfer Abilities  3. Decreased Ambulation Ability/Technique  4. Decreased Balance   INTERVENTIONS PLANNED: (Benefits and precautions of physical therapy have been discussed with the patient.)  1. Balance Exercise  2. Bed Mobility  3. Family Education  4. Gait Training  5. Therapeutic Activites  6. Therapeutic Exercise/Strengthening  7. Transfer Training  8. Group Therapy     TREATMENT PLAN: Frequency/Duration: 3 times a week for duration of hospital stay  Rehabilitation Potential For Stated Goals: Good     RECOMMENDED REHABILITATION/EQUIPMENT: (at time of discharge pending progress): Due to the probability of continued deficits (see above) this patient will likely need continued skilled physical therapy after discharge. Equipment:    None at this time              HISTORY:   History of Present Injury/Illness (Reason for Referral):  See H&P  Past Medical History/Comorbidities:   Ms. Quispe  has a past medical history of Abnormal gait (7/5/2016); Acute serous otitis media of left ear; Anxiety; Bilateral carpal tunnel syndrome (7/5/2016); Chronic abdominal pain (7/5/2016);  Dementia due to Parkinson's disease without behavioral disturbance ([facility] (7/5/2016); Depression; Diverticulitis; Gastritis; GERD (gastroesophageal reflux disease); Heart murmur; Hypercholesterolemia; Hypertension; Neuropathic pain (7/5/2016); Nontoxic multinodular goiter (7/5/2016); Orthostatic hypotension; Parkinson's disease ([facility] Peripheral neuropathy; Syncope (10/10/2015); Type 2 diabetes mellitus without complication (8/0/7710); Urinary frequency (7/5/2016); Vaginal bleeding; and Weight loss, unintentional. She also has no past medical history of Difficult intubation; Malignant hyperthermia due to anesthesia; or Pseudocholinesterase deficiency. Ms. Hank Hall  has a past surgical history that includes hx cholecystectomy (2001); hx tubal ligation (1974); hx appendectomy (1974); and colonoscopy (N/A, 8/6/2018). Social History/Living Environment:   Home Environment: Private residence  One/Two Story Residence: One story  Living Alone: Yes  Support Systems: Home care staff  Patient Expects to be Discharged to[de-identified] Unknown  Current DME Used/Available at Home: Walker, rolling, Wheelchair, Shower chair, Grab bars  Tub or Shower Type: Tub/Shower combination  Prior Level of Function/Work/Activity:  Pt reported that she lives alone but has an aide daily come for several hours who assists with ADLs. Pt stated she ambulated with RW PTA with several recent falls.      Number of Personal Factors/Comorbidities that affect the Plan of Care: 3+: HIGH COMPLEXITY   EXAMINATION:   Most Recent Physical Functioning:   Gross Assessment:  AROM: Within functional limits  Strength: Generally decreased, functional               Posture:     Balance:  Sitting: Intact  Standing: Impaired  Standing - Static: Fair  Standing - Dynamic : Fair Bed Mobility:  Supine to Sit: Supervision  Wheelchair Mobility:     Transfers:  Sit to Stand: Contact guard assistance  Stand to Sit: Contact guard assistance  Bed to Chair: Contact guard assistance;Minimum assistance  Gait:     Base of Support: Narrowed  Speed/Angela: Slow;Shuffled  Step Length: Left shortened;Right shortened  Distance (ft): 2 Feet (ft)  Assistive Device: Walker, rolling  Ambulation - Level of Assistance: Contact guard assistance;Minimal assistance      Body Structures Involved:  1. Digestive Structures  2. Muscles Body Functions Affected:  1. Neuromusculoskeletal  2. Movement Related  3. Digestive Activities and Participation Affected:  1. General Tasks and Demands  2. Mobility  3. Community, Social and [place]   Number of elements that affect the Plan of Care: 4+: HIGH COMPLEXITY   CLINICAL PRESENTATION:   Presentation: Stable and uncomplicated: LOW COMPLEXITY   CLINICAL DECISION MAKIN [facility] Mobility Inpatient Short Form  How much difficulty does the patient currently have. .. Unable A Lot A Little None   1. Turning over in bed (including adjusting bedclothes, sheets and blankets)? [] 1   [] 2   [] 3   [x] 4   2. Sitting down on and standing up from a chair with arms ( e.g., wheelchair, bedside commode, etc.)   [] 1   [] 2   [x] 3   [] 4   3. Moving from lying on back to sitting on the side of the bed? [] 1   [] 2   [] 3   [x] 4   How much help from another person does the patient currently need. .. Total A Lot A Little None   4. Moving to and from a bed to a chair (including a wheelchair)? [] 1   [] 2   [x] 3   [] 4   5. Need to walk in hospital room? [] 1   [] 2   [x] 3   [] 4   6. Climbing 3-5 steps with a railing? [] 1   [] 2   [x] 3   [] 4   © , Trustees of [address] [place] [address] [address] [id number], under license to [email or web address]. All rights reserved      Score:  Initial: 20 Most Recent: X (Date: -- )    Interpretation of Tool:  Represents activities that are increasingly more difficult (i.e. Bed mobility, Transfers, Gait). Score 24 23 22-20 19-15 14-10 9-7 6     Modifier CH CI CJ CK CL CM CN      ?  Mobility - Walking and Moving Around:     - CURRENT STATUS: CJ - 20%-39% impaired, limited or restricted    - GOAL STATUS: CI - 1%-19% impaired, limited or restricted    - D/C STATUS:  ---------------To be determined---------------  [place] / [place] / Product Type: Medicare /      Medical Necessity:     · Patient demonstrates good rehab potential due to higher previous functional level. Reason for Services/Other Comments:  · Patient continues to require skilled intervention due to decreased balance and functional mobility. Use of outcome tool(s) and clinical judgement create a POC that gives a: Clear prediction of patient's progress: LOW COMPLEXITY            TREATMENT:   (In addition to Assessment/Re-Assessment sessions the following treatments were rendered)   Pre-treatment Symptoms/Complaints:  Post op pain  Pain: Initial:   Pain Intensity 1: 6  Pain Location 1: Incisional  Post Session:  6/10     Therapeutic Exercise: (  8 minutes):  Exercises per grid below to improve mobility and strength. Required minimal visual and verbal cues to promote proper body alignment and promote proper body mechanics. Progressed repetitions and complexity of movement as indicated. Date:  9/7/18 Date:   Date:     ACTIVITY/EXERCISE AM PM AM PM AM PM   Seated LAQ 3 x 25 B        Seated marching 3 x 25 B        Seated AP 3 x 25 B        Seated hip abd/add 2 x 25                                   B = bilateral; AA = active assistive; A = active; P = passive          Braces/Orthotics/Lines/Etc:   · IV  Treatment/Session Assessment:    · Response to Treatment:  Tolerated well with only complaints of post op pain. · Interdisciplinary Collaboration:   o Physical Therapist  o Registered Nurse  · After treatment position/precautions:   o Up in chair  o Bed/Chair-wheels locked  o Bed in low position  o Call light within reach   · Compliance with Program/Exercises: Will assess as treatment progresses. · Recommendations/Intent for next treatment session:   \"Next visit will focus on advancements to more challenging activities and reduction in assistance provided\".   Total Treatment Duration:  PT Patient Time In/Time Out  Time In: 0931  Time Out: [id number] Silva Sharma, PT, DPT

## 2018-09-08 LAB
ANION GAP SERPL CALC-SCNC: 9 MMOL/L (ref 7–16)
BUN SERPL-MCNC: 6 MG/DL (ref 8–23)
CALCIUM SERPL-MCNC: 8 MG/DL (ref 8.3–10.4)
CHLORIDE SERPL-SCNC: 108 MMOL/L (ref 98–107)
CO2 SERPL-SCNC: 26 MMOL/L (ref 21–32)
CREAT SERPL-MCNC: 0.86 MG/DL (ref 0.6–1)
ERYTHROCYTE [DISTWIDTH] IN BLOOD BY AUTOMATED COUNT: 15.5 %
GLUCOSE SERPL-MCNC: 87 MG/DL (ref 65–100)
HCT VFR BLD AUTO: 31.7 % (ref 35.8–46.3)
HGB BLD-MCNC: 10 G/DL (ref 11.7–15.4)
MAGNESIUM SERPL-MCNC: 2.1 MG/DL (ref 1.8–2.4)
MCH RBC QN AUTO: 25.6 PG (ref 26.1–32.9)
MCHC RBC AUTO-ENTMCNC: 31.5 G/DL (ref 31.4–35)
MCV RBC AUTO: 81.1 FL (ref 79.6–97.8)
MM INDURATION POC: 0 MM (ref 0–5)
NRBC # BLD: 0 K/UL (ref 0–0.2)
PHOSPHATE SERPL-MCNC: 2.7 MG/DL (ref 2.3–3.7)
PLATELET # BLD AUTO: 213 K/UL (ref 150–450)
PMV BLD AUTO: 10.9 FL (ref 9.4–12.3)
POTASSIUM SERPL-SCNC: 3.6 MMOL/L (ref 3.5–5.1)
PPD POC: NEGATIVE NEGATIVE
RBC # BLD AUTO: 3.91 M/UL (ref 4.05–5.2)
SODIUM SERPL-SCNC: 143 MMOL/L (ref 136–145)
WBC # BLD AUTO: 6.8 K/UL (ref 4.3–11.1)

## 2018-09-08 PROCEDURE — 83735 ASSAY OF MAGNESIUM: CPT

## 2018-09-08 PROCEDURE — 74011000258 HC RX REV CODE- 258: Performed by: NURSE PRACTITIONER

## 2018-09-08 PROCEDURE — 36415 COLL VENOUS BLD VENIPUNCTURE: CPT

## 2018-09-08 PROCEDURE — 84100 ASSAY OF PHOSPHORUS: CPT

## 2018-09-08 PROCEDURE — 74011250636 HC RX REV CODE- 250/636: Performed by: NURSE PRACTITIONER

## 2018-09-08 PROCEDURE — 77030032490 HC SLV COMPR SCD KNE COVD -B

## 2018-09-08 PROCEDURE — 74011000258 HC RX REV CODE- 258: Performed by: SURGERY

## 2018-09-08 PROCEDURE — 80048 BASIC METABOLIC PNL TOTAL CA: CPT

## 2018-09-08 PROCEDURE — 65270000029 HC RM PRIVATE

## 2018-09-08 PROCEDURE — 74011000250 HC RX REV CODE- 250: Performed by: SURGERY

## 2018-09-08 PROCEDURE — 74011250636 HC RX REV CODE- 250/636: Performed by: SURGERY

## 2018-09-08 PROCEDURE — 85027 COMPLETE CBC AUTOMATED: CPT

## 2018-09-08 PROCEDURE — 74011250637 HC RX REV CODE- 250/637: Performed by: SURGERY

## 2018-09-08 RX ORDER — ACETAMINOPHEN 325 MG/1
650 TABLET ORAL EVERY 8 HOURS
Status: DISCONTINUED | OUTPATIENT
Start: 2018-09-08 | End: 2018-09-11 | Stop reason: HOSPADM

## 2018-09-08 RX ADMIN — CARBIDOPA AND LEVODOPA 2 TABLET: 25; 100 TABLET ORAL at 09:00

## 2018-09-08 RX ADMIN — HEPARIN SODIUM 5000 UNITS: 5000 INJECTION INTRAVENOUS; SUBCUTANEOUS at 20:12

## 2018-09-08 RX ADMIN — PREGABALIN 50 MG: 25 CAPSULE ORAL at 17:17

## 2018-09-08 RX ADMIN — MEGESTROL ACETATE 200 MG: 40 SUSPENSION ORAL at 08:57

## 2018-09-08 RX ADMIN — ACETAMINOPHEN 1000 MG: 10 INJECTION, SOLUTION INTRAVENOUS at 00:14

## 2018-09-08 RX ADMIN — OXYCODONE AND ACETAMINOPHEN 1 TABLET: 5; 325 TABLET ORAL at 07:11

## 2018-09-08 RX ADMIN — PREGABALIN 50 MG: 25 CAPSULE ORAL at 08:56

## 2018-09-08 RX ADMIN — ALVIMOPAN 12 MG: 12 CAPSULE ORAL at 08:56

## 2018-09-08 RX ADMIN — MIDODRINE HYDROCHLORIDE 2.5 MG: 5 TABLET ORAL at 08:56

## 2018-09-08 RX ADMIN — HEPARIN SODIUM 5000 UNITS: 5000 INJECTION INTRAVENOUS; SUBCUTANEOUS at 08:57

## 2018-09-08 RX ADMIN — CEFTRIAXONE SODIUM 1 G: 1 INJECTION, POWDER, FOR SOLUTION INTRAMUSCULAR; INTRAVENOUS at 11:03

## 2018-09-08 RX ADMIN — ACETAMINOPHEN 650 MG: 325 TABLET ORAL at 23:43

## 2018-09-08 RX ADMIN — CARBIDOPA AND LEVODOPA 2 TABLET: 25; 100 TABLET ORAL at 03:50

## 2018-09-08 RX ADMIN — ONDANSETRON 4 MG: 2 INJECTION INTRAMUSCULAR; INTRAVENOUS at 07:15

## 2018-09-08 RX ADMIN — DEXTROSE MONOHYDRATE, SODIUM CHLORIDE, AND POTASSIUM CHLORIDE 75 ML/HR: 50; 4.5; 1.49 INJECTION, SOLUTION INTRAVENOUS at 00:23

## 2018-09-08 RX ADMIN — CARBIDOPA AND LEVODOPA 2 TABLET: 25; 100 TABLET ORAL at 13:14

## 2018-09-08 RX ADMIN — FAMOTIDINE 20 MG: 10 INJECTION, SOLUTION INTRAVENOUS at 15:14

## 2018-09-08 RX ADMIN — PIPERACILLIN SODIUM,TAZOBACTAM SODIUM 3.38 G: 3; .375 INJECTION, POWDER, FOR SOLUTION INTRAVENOUS at 03:48

## 2018-09-08 RX ADMIN — ALPRAZOLAM 0.5 MG: 0.5 TABLET ORAL at 20:12

## 2018-09-08 RX ADMIN — CARBIDOPA AND LEVODOPA 1 TABLET: 25; 100 TABLET, ORALLY DISINTEGRATING ORAL at 00:14

## 2018-09-08 RX ADMIN — CARBIDOPA AND LEVODOPA 2 TABLET: 25; 100 TABLET ORAL at 15:15

## 2018-09-08 RX ADMIN — ONDANSETRON 4 MG: 2 INJECTION INTRAMUSCULAR; INTRAVENOUS at 07:14

## 2018-09-08 RX ADMIN — CARBIDOPA AND LEVODOPA 2 TABLET: 25; 100 TABLET ORAL at 17:17

## 2018-09-08 RX ADMIN — ACETAMINOPHEN 650 MG: 325 TABLET ORAL at 13:14

## 2018-09-08 RX ADMIN — CARBIDOPA AND LEVODOPA 2 TABLET: 25; 100 TABLET ORAL at 20:13

## 2018-09-08 RX ADMIN — ACETAMINOPHEN 1000 MG: 10 INJECTION, SOLUTION INTRAVENOUS at 08:56

## 2018-09-08 RX ADMIN — CARBIDOPA AND LEVODOPA 1 TABLET: 25; 100 TABLET, ORALLY DISINTEGRATING ORAL at 23:43

## 2018-09-08 RX ADMIN — ALVIMOPAN 12 MG: 12 CAPSULE ORAL at 17:17

## 2018-09-08 RX ADMIN — CARBIDOPA AND LEVODOPA 2 TABLET: 25; 100 TABLET ORAL at 07:12

## 2018-09-08 NOTE — PROGRESS NOTES
Pt requesting to be transferred to different floor because \"this floor is like a dungeon and will give her an infection\". This RN called [employer], who said there were no empty beds in hospital to transfer pt to. Informed pt, pt very unhappy.

## 2018-09-08 NOTE — PROGRESS NOTES
PLAN:  Advance to Full Liquids  Pathology noted  Await sensitivities-- cultures + e. Coli; on Zosyn  Continue IVF D5 1/2 NS [email or web address]  Entereg x 7 days   SCD/IS/Heparin/Pepcid for prophylactic measures  On PTA meds for Parkinsons   OOB and ambulate  Pain control  Monitor labs  Plan for SNF Monday    ASSESSMENT:  Admit Date: 9/5/2018   3 Day Post-Op  Procedure(s):  OPEN SIGMOID COLECTOMY WITH EEA  ANASTOMOSIS, DRAINAGE OF LOCALIZED ABSCESS, MOBILIZATION OF SPLENIC FLEXURE    Cystoscopy and bilateral ureteral catheter placement. FIRST PROCEDURE    Principal Problem:    Diverticulitis of large intestine with abscess (9/5/2018)    Active Problems:    Parkinsonism ([address] (8/10/2015)      Parkinson's disease ([address] ()      Neurologic orthostatic hypotension ([address] (9/6/2016)         HPI:Essence Isidro is a 76 y.o. female who returns following EGD/colonoscopy by Dr. Kalpana Michel in early August and a follow-up CT scan performed on August 20. I reviewed those reports and images from the CT scan. She has continued to have recurrent bouts of abdominal pain throughout this time. She has had multiple visits to the emergency room. She has been placed on antibiotics multiple times. It is apparent that she has chronic recurrent sigmoid diverticulitis. There may also be other things going on with her colon. The CT did show some colitis in the ascending colon. Her complaints are primarily left lower quadrant. She does have some generalized complaints as well. She was placed on Megace which seems to be increasing her appetite. She was present with a caregiver today who says that she will actually eat through the night. She was also placed on mesalamine but she is having difficulty swallowing that pill. She is currently afebrile. She reports having a bowel movement once every 2 weeks. The caregiver corrected her since said that she is actually having fairly regular bowel movements almost daily this week.   She is taking MiraLAX. She is still complaining of hard stools and may need to adjust that dosing.     I had seen her in January 2018 in the office for 4 week follow-up of her complicated diverticulitis. I also saw her in December 2017. A few days after that visit she went to the emergency department for continued complaints of abdominal pain. A follow-up CT scan was performed which shows complete resolution of her diverticulitis and abscess cavity. Her all of her blood work was normal.  She was supposed to have an appointment with Dr. Kalpana Michel last week but this was canceled due to the snowy conditions. She will set up a repeat appointment with him. She still has many aches and pains. These appear to be chronic. I reviewed her new CT scan and attached the corresponding image from prior image with abscess. This shows resolution and only chronic other findings.     On last visit in December 2017, she comes in not feeling well. She complains of loss of appetite, nausea, abdominal and chest pain. She was seen by Dr. Kalpana Michel within the past 2 days. I was able to speak with him as well. He ordered blood work which was all without abnormality. He did order a repeat CT scan which will be performed next week. If that scan has remained normalized then he will proceed with colonoscopy. She denies fevers but she does report some chills. She says she has lost additional weight. Her weight today is 111 pounds and was 114 pounds 4 weeks ago. She claims her normal weight is 135 pounds. When I saw her frequent 4 weeks ago she had just started a 30 day course of Cipro and Flagyl. She claims that she started developing some rash and swelling 2 days ago and stopped the antibiotics at that time but should have nearly completed that course.     She was referred by Dr. Kalp[facility] of [facility] for evaluation of diverticulitis with peridiverticular abscess.   Patient is very pleasant woman who presents with a caregiver who she uses due to Parkinson's disease. She moved from [place] in 2015. She was diagnosed with acute diverticulitis with peridiverticular abscess on September 15, 2017. This was at [facility] and she was managed with antibiotics. The originally planned to place a percutaneous drain but that was not required. I do not have access to that CT scan. She had follow-up CT scans one month later on October 16 and then again more recently on October 27. Both of those studies showed continued improvement of the size of the peridiverticular abscess which originally was close to 3 cm then decreased to 1.6 cm and now on the recent study is 1.1 cm and may actually represent a large diverticulum or a resolving abscess. There is very little evidence of residual inflammation. She continues to have some abdominal symptoms but she is not having any fevers or severe abdominal pain. She has chronic constipation and does take a dose of MiraLAX per day. She still has very hard balls for stool. She has not seen any ribbon stools because of these ball-like stools. She had been on Augmentin prior to recent visit with Dr. Keyanna Carrion. He saw her 2 days ago on November 15 and change her antibiotics from Augmentin to Cipro and Flagyl. He placed her on a 30 day regimen. In further questioning she is taking medications with her MiraLAX. I discussed with her that this may actually affect the absorption of any of her medications as the water bound to MiraLAX is a reversible and must exit the body via the anus. This will frequently capture medications as well and that practice of taking any medications within one half hour of MiraLAX is discouraged. She has one prior episode in her history of diverticulitis. This was in 2015 weeks she was still living in [place]. Similarly she had a peridiverticular abscess that did not require drainage and was treated with antibiotics.   Her last colonoscopy was in 2014 or 2015 prior to her episode of diverticulitis. She was noted to have diverticulosis at that time according to her. She has had upper endoscopies type Dr. Pastor Satley recently. These were unremarkable. I have believe he is planning on doing a colonoscopy once there is resolution of her diverticulitis. My usual recommendation is no sooner than 6 weeks to 3 months following resolution. This is done sooner if there is a suspicion of malignancy. Her abdominal surgical history is fairly insignificant. She has had a laparoscopic cholecystectomy as well as bilateral tubal ligation. She has not had any colon procedures.     She has noted allergies to oral and IV iodinated contrast as well as metronidazole. All of these have been used recently or currently and seem to be without issue. SUBJECTIVE:  9/6/18: POD1: Awake in bed. Reports adequate pain control. WBC 12.8, Lytes stable. AF, VSS. 1.7L UOP. -flatus. 9/7/18 POD 2: Awake, alert, + flatus, -BM. -N/V.  WBC 9.4, K+ 3.4. AF, VSS. Cultures and path have returned. 9/8/18 POD 3: Awake, alert, + flatus, +BM. Tolerating clears and requesting more to eat. No N/V. Voiding without difficulty. WBC 6.8, K+ 3.6. AF, VSS. Intake/Output Summary (Last 24 hours) at 09/08/18 0804  Last data filed at 09/08/18 0729   Gross per 24 hour   Intake             2078 ml   Output             1525 ml   Net              553 ml     OBJECTIVE:  Constitutional: Alert oriented cooperative patient in no acute distress; appears stated age   Visit Vitals    /63    Pulse (!) 47    Temp 98.4 °F (36.9 °C)    Resp 18    Wt 105 lb 8 oz (47.9 kg)    SpO2 94%    BMI 16.52 kg/m2     Eyes:Sclera are clear. ENMT: no external lesions gross hearing normal; no obvious neck masses, no ear or lip lesions  CV: Normal perfusion  Resp: No JVD. Breathing is  non-labored; no audible wheezing. GI: soft and non-distended, aquacel dressing c/d/i, BS active  Musculoskeletal: unremarkable with normal function.  No embolic signs or cyanosis.    Neuro:  Oriented; moves all 4; no focal deficits  Psychiatric: normal affect and mood, no memory impairment      Patient Vitals for the past 24 hrs:   BP Temp Pulse Resp SpO2   09/08/18 0729 128/63 98.4 °F (36.9 °C) (!) 47 18 94 %   09/08/18 0348 124/66 98.6 °F (37 °C) 62 17 97 %   09/07/18 2310 123/64 98.4 °F (36.9 °C) 60 17 98 %   09/07/18 1905 122/62 98.1 °F (36.7 °C) 60 17 97 %   09/07/18 1556 155/80 97.8 °F (36.6 °C) 74 17 98 %   09/07/18 1200 158/86 98 °F (36.7 °C) 87 18 99 %     Labs:    Recent Labs      09/08/18   0503   WBC  6.8   HGB  10.0*   PLT  213   NA  143   K  3.6   CL  108*   CO2  26   BUN  6*   CREA  0.86   GLU  87       Signed:  Maggie Grant, FNP-BC

## 2018-09-08 NOTE — PROGRESS NOTES
END OF SHIFT NOTE:    INTAKE/OUTPUT  09/07 0701 - 09/08 0700  In: 1931 [I.V.:1931]  Out: 2175 [Urine:2175]  Voiding: YES  Catheter: NO  Drain:              Flatus: Patient does have flatus present. Stool:  0 occurrences. Characteristics:  Stool Assessment  Stool Color: Maroon  Stool Appearance: Other (comment) (mucousy maroon )  Stool Amount: Small  Stool Source/Status: Rectum    Emesis: 0 occurrences. Characteristics:        VITAL SIGNS  Patient Vitals for the past 12 hrs:   Temp Pulse Resp BP SpO2   09/07/18 2310 98.4 °F (36.9 °C) 60 17 123/64 98 %   09/07/18 1905 98.1 °F (36.7 °C) 60 17 122/62 97 %       Pain Assessment  Pain Intensity 1: 0 (09/07/18 1202)  Pain Location 1: Incisional  Pain Intervention(s) 1: Medication (see MAR)  Patient Stated Pain Goal: 0    Ambulating  Yes    Shift report given to oncoming nurse at the bedside.     Dorothy Moreira RN

## 2018-09-09 LAB
ANION GAP SERPL CALC-SCNC: 6 MMOL/L (ref 7–16)
BACTERIA SPEC CULT: ABNORMAL
BUN SERPL-MCNC: 5 MG/DL (ref 8–23)
CALCIUM SERPL-MCNC: 8.4 MG/DL (ref 8.3–10.4)
CHLORIDE SERPL-SCNC: 108 MMOL/L (ref 98–107)
CO2 SERPL-SCNC: 29 MMOL/L (ref 21–32)
CREAT SERPL-MCNC: 0.8 MG/DL (ref 0.6–1)
ERYTHROCYTE [DISTWIDTH] IN BLOOD BY AUTOMATED COUNT: 15.3 %
GLUCOSE SERPL-MCNC: 86 MG/DL (ref 65–100)
GRAM STN SPEC: ABNORMAL
HCT VFR BLD AUTO: 33.6 % (ref 35.8–46.3)
HGB BLD-MCNC: 10.6 G/DL (ref 11.7–15.4)
MAGNESIUM SERPL-MCNC: 2.2 MG/DL (ref 1.8–2.4)
MCH RBC QN AUTO: 25.5 PG (ref 26.1–32.9)
MCHC RBC AUTO-ENTMCNC: 31.5 G/DL (ref 31.4–35)
MCV RBC AUTO: 81 FL (ref 79.6–97.8)
MM INDURATION POC: 0 MM (ref 0–5)
NRBC # BLD: 0 K/UL (ref 0–0.2)
PHOSPHATE SERPL-MCNC: 2.9 MG/DL (ref 2.3–3.7)
PLATELET # BLD AUTO: 248 K/UL (ref 150–450)
PMV BLD AUTO: 11.4 FL (ref 9.4–12.3)
POTASSIUM SERPL-SCNC: 3.3 MMOL/L (ref 3.5–5.1)
PPD POC: NORMAL NEGATIVE
RBC # BLD AUTO: 4.15 M/UL (ref 4.05–5.2)
SERVICE CMNT-IMP: ABNORMAL
SODIUM SERPL-SCNC: 143 MMOL/L (ref 136–145)
WBC # BLD AUTO: 5.7 K/UL (ref 4.3–11.1)

## 2018-09-09 PROCEDURE — 74011000258 HC RX REV CODE- 258: Performed by: SURGERY

## 2018-09-09 PROCEDURE — 74011000250 HC RX REV CODE- 250: Performed by: SURGERY

## 2018-09-09 PROCEDURE — 74011250636 HC RX REV CODE- 250/636: Performed by: SURGERY

## 2018-09-09 PROCEDURE — 80048 BASIC METABOLIC PNL TOTAL CA: CPT

## 2018-09-09 PROCEDURE — 84100 ASSAY OF PHOSPHORUS: CPT

## 2018-09-09 PROCEDURE — 65270000029 HC RM PRIVATE

## 2018-09-09 PROCEDURE — 74011250636 HC RX REV CODE- 250/636: Performed by: NURSE PRACTITIONER

## 2018-09-09 PROCEDURE — 85027 COMPLETE CBC AUTOMATED: CPT

## 2018-09-09 PROCEDURE — 83735 ASSAY OF MAGNESIUM: CPT

## 2018-09-09 PROCEDURE — 36415 COLL VENOUS BLD VENIPUNCTURE: CPT

## 2018-09-09 PROCEDURE — 74011250637 HC RX REV CODE- 250/637: Performed by: SURGERY

## 2018-09-09 RX ADMIN — CARBIDOPA AND LEVODOPA 2 TABLET: 25; 100 TABLET ORAL at 12:24

## 2018-09-09 RX ADMIN — FAMOTIDINE 20 MG: 10 INJECTION, SOLUTION INTRAVENOUS at 15:29

## 2018-09-09 RX ADMIN — MIDODRINE HYDROCHLORIDE 2.5 MG: 5 TABLET ORAL at 08:45

## 2018-09-09 RX ADMIN — MEGESTROL ACETATE 200 MG: 40 SUSPENSION ORAL at 08:44

## 2018-09-09 RX ADMIN — CARBIDOPA AND LEVODOPA 2 TABLET: 25; 100 TABLET ORAL at 17:43

## 2018-09-09 RX ADMIN — CARBIDOPA AND LEVODOPA 2 TABLET: 25; 100 TABLET ORAL at 03:25

## 2018-09-09 RX ADMIN — HEPARIN SODIUM 5000 UNITS: 5000 INJECTION INTRAVENOUS; SUBCUTANEOUS at 08:43

## 2018-09-09 RX ADMIN — PREGABALIN 50 MG: 25 CAPSULE ORAL at 17:43

## 2018-09-09 RX ADMIN — ONDANSETRON 4 MG: 2 INJECTION INTRAMUSCULAR; INTRAVENOUS at 13:27

## 2018-09-09 RX ADMIN — ALVIMOPAN 12 MG: 12 CAPSULE ORAL at 17:43

## 2018-09-09 RX ADMIN — ONDANSETRON 4 MG: 2 INJECTION INTRAMUSCULAR; INTRAVENOUS at 19:37

## 2018-09-09 RX ADMIN — CEFTRIAXONE SODIUM 1 G: 1 INJECTION, POWDER, FOR SOLUTION INTRAMUSCULAR; INTRAVENOUS at 10:12

## 2018-09-09 RX ADMIN — OXYCODONE AND ACETAMINOPHEN 1 TABLET: 5; 325 TABLET ORAL at 12:24

## 2018-09-09 RX ADMIN — PREGABALIN 50 MG: 25 CAPSULE ORAL at 08:44

## 2018-09-09 RX ADMIN — CARBIDOPA AND LEVODOPA 2 TABLET: 25; 100 TABLET ORAL at 08:44

## 2018-09-09 RX ADMIN — OXYCODONE AND ACETAMINOPHEN 1 TABLET: 5; 325 TABLET ORAL at 19:37

## 2018-09-09 RX ADMIN — DEXTROSE MONOHYDRATE, SODIUM CHLORIDE, AND POTASSIUM CHLORIDE 25 ML/HR: 50; 4.5; 1.49 INJECTION, SOLUTION INTRAVENOUS at 17:00

## 2018-09-09 RX ADMIN — HEPARIN SODIUM 5000 UNITS: 5000 INJECTION INTRAVENOUS; SUBCUTANEOUS at 22:22

## 2018-09-09 RX ADMIN — DEXTROSE MONOHYDRATE, SODIUM CHLORIDE, AND POTASSIUM CHLORIDE 75 ML/HR: 50; 4.5; 1.49 INJECTION, SOLUTION INTRAVENOUS at 03:24

## 2018-09-09 RX ADMIN — CARBIDOPA AND LEVODOPA 2 TABLET: 25; 100 TABLET ORAL at 15:29

## 2018-09-09 RX ADMIN — ALPRAZOLAM 0.5 MG: 0.5 TABLET ORAL at 22:21

## 2018-09-09 RX ADMIN — CARBIDOPA AND LEVODOPA 2 TABLET: 25; 100 TABLET ORAL at 06:32

## 2018-09-09 RX ADMIN — CARBIDOPA AND LEVODOPA 1 TABLET: 25; 100 TABLET ORAL at 22:21

## 2018-09-09 RX ADMIN — ALVIMOPAN 12 MG: 12 CAPSULE ORAL at 08:44

## 2018-09-09 NOTE — PROGRESS NOTES
END OF SHIFT NOTE:    INTAKE/OUTPUT  09/08 0701 - 09/09 0700  In: 1319 [I.V.:1319]  Out: 2250 [Urine:2250]  Voiding: YES  Catheter: NO  Drain:              Flatus: Patient does have flatus present. Stool:  1 occurrences. Loose brown medium stool this shift. Characteristics:  Stool Assessment  Stool Color: Brown  Stool Appearance: Loose  Stool Amount: Medium  Stool Source/Status: Rectum    Emesis: 0 occurrences. Characteristics:        VITAL SIGNS  Patient Vitals for the past 12 hrs:   Temp Pulse Resp BP SpO2   09/08/18 2355 98.1 °F (36.7 °C) 80 17 114/67 98 %   09/08/18 1955 98 °F (36.7 °C) 62 15 128/79 92 %       Pain Assessment  Pain Intensity 1: 0 (09/08/18 1301)  Pain Location 1: Incisional  Pain Intervention(s) 1: Medication (see MAR)  Patient Stated Pain Goal: 0    Ambulating  Yes    Shift report given to oncoming nurse at the bedside.     Josue Arango RN

## 2018-09-09 NOTE — PROGRESS NOTES
PLAN:  Advance to GI Soft Diet  Pathology noted  Await sensitivities-- cultures + e. Coli; on Zosyn  Decrease IVF D5 1/2 NS w 20KCL   Entereg x 7 days   SCD/IS/Heparin/Pepcid for prophylactic measures  On PTA meds for Parkinsons   OOB and ambulate  Pain control  Monitor labs -replace electrolytes prn  Plan for SNF Monday    ASSESSMENT:  Admit Date: 9/5/2018   4 Day Post-Op  Procedure(s):  OPEN SIGMOID COLECTOMY WITH EEA  ANASTOMOSIS, DRAINAGE OF LOCALIZED ABSCESS, MOBILIZATION OF SPLENIC FLEXURE    Cystoscopy and bilateral ureteral catheter placement. FIRST PROCEDURE    Principal Problem:    Diverticulitis of large intestine with abscess (9/5/2018)    Active Problems:    Parkinsonism ([address] (8/10/2015)      Parkinson's disease ([facility] [address] ()      Neurologic orthostatic hypotension ([facility] [address] (9/6/2016)         HPI:Essence Edmonds is a 76 y.o. female who returns following EGD/colonoscopy by Dr. Virginie Diehl in early August and a follow-up CT scan performed on August 20. I reviewed those reports and images from the CT scan. She has continued to have recurrent bouts of abdominal pain throughout this time. She has had multiple visits to the emergency room. She has been placed on antibiotics multiple times. It is apparent that she has chronic recurrent sigmoid diverticulitis. There may also be other things going on with her colon. The CT did show some colitis in the ascending colon. Her complaints are primarily left lower quadrant. She does have some generalized complaints as well. She was placed on Megace which seems to be increasing her appetite. She was present with a caregiver today who says that she will actually eat through the night. She was also placed on mesalamine but she is having difficulty swallowing that pill. She is currently afebrile. She reports having a bowel movement once every 2 weeks.   The caregiver corrected her since said that she is actually having fairly regular bowel movements almost daily this week. She is taking MiraLAX. She is still complaining of hard stools and may need to adjust that dosing.     I had seen her in January 2018 in the office for 4 week follow-up of her complicated diverticulitis. I also saw her in December 2017. A few days after that visit she went to the emergency department for continued complaints of abdominal pain. A follow-up CT scan was performed which shows complete resolution of her diverticulitis and abscess cavity. Her all of her blood work was normal.  She was supposed to have an appointment with Dr. Emile Bolden last week but this was canceled due to the snowy conditions. She will set up a repeat appointment with him. She still has many aches and pains. These appear to be chronic. I reviewed her new CT scan and attached the corresponding image from prior image with abscess. This shows resolution and only chronic other findings.     On last visit in December 2017, she comes in not feeling well. She complains of loss of appetite, nausea, abdominal and chest pain. She was seen by Dr. Emile Bolden within the past 2 days. I was able to speak with him as well. He ordered blood work which was all without abnormality. He did order a repeat CT scan which will be performed next week. If that scan has remained normalized then he will proceed with colonoscopy. She denies fevers but she does report some chills. She says she has lost additional weight. Her weight today is 111 pounds and was 114 pounds 4 weeks ago. She claims her normal weight is 135 pounds. When I saw her frequent 4 weeks ago she had just started a 30 day course of Cipro and Flagyl. She claims that she started developing some rash and swelling 2 days ago and stopped the antibiotics at that time but should have nearly completed that course.     She was referred by Dr. [facility] of [facility] for evaluation of diverticulitis with peridiverticular abscess.   Patient is very pleasant woman who presents with a caregiver who she uses due to Parkinson's disease. She moved from [place] in 2015. She was diagnosed with acute diverticulitis with peridiverticular abscess on September 15, 2017. This was at [facility] and she was managed with antibiotics. The originally planned to place a percutaneous drain but that was not required. I do not have access to that CT scan. She had follow-up CT scans one month later on October 16 and then again more recently on October 27. Both of those studies showed continued improvement of the size of the peridiverticular abscess which originally was close to 3 cm then decreased to 1.6 cm and now on the recent study is 1.1 cm and may actually represent a large diverticulum or a resolving abscess. There is very little evidence of residual inflammation. She continues to have some abdominal symptoms but she is not having any fevers or severe abdominal pain. She has chronic constipation and does take a dose of MiraLAX per day. She still has very hard balls for stool. She has not seen any ribbon stools because of these ball-like stools. She had been on Augmentin prior to recent visit with Dr. Cynthia Vargas. He saw her 2 days ago on November 15 and change her antibiotics from Augmentin to Cipro and Flagyl. He placed her on a 30 day regimen. In further questioning she is taking medications with her MiraLAX. I discussed with her that this may actually affect the absorption of any of her medications as the water bound to MiraLAX is a reversible and must exit the body via the anus. This will frequently capture medications as well and that practice of taking any medications within one half hour of MiraLAX is discouraged. She has one prior episode in her history of diverticulitis. This was in 2015 weeks she was still living in [place]. Similarly she had a peridiverticular abscess that did not require drainage and was treated with antibiotics.   Her last colonoscopy was in 2014 or 2015 prior to her episode of diverticulitis. She was noted to have diverticulosis at that time according to her. She has had upper endoscopies type Dr. Hilario Recinos recently. These were unremarkable. I have believe he is planning on doing a colonoscopy once there is resolution of her diverticulitis. My usual recommendation is no sooner than 6 weeks to 3 months following resolution. This is done sooner if there is a suspicion of malignancy. Her abdominal surgical history is fairly insignificant. She has had a laparoscopic cholecystectomy as well as bilateral tubal ligation. She has not had any colon procedures.     She has noted allergies to oral and IV iodinated contrast as well as metronidazole. All of these have been used recently or currently and seem to be without issue. SUBJECTIVE:  9/6/18: POD1: Awake in bed. Reports adequate pain control. WBC 12.8, Lytes stable. AF, VSS. 1.7L UOP. -flatus. 9/7/18 POD 2: Awake, alert, + flatus, -BM. -N/V.  WBC 9.4, K+ 3.4. AF, VSS. Cultures and path have returned. 9/8/18 POD 3: Awake, alert, + flatus, +BM. Tolerating clears and requesting more to eat. No N/V. Voiding without difficulty. WBC 6.8, K+ 3.6. AF, VSS.   9/9/18 POD 3: Sitting in recliner, alert, + flatus, +BM. Tolerating full liquids and requesting more to eat. No N/V. Voiding without difficulty. WBC 5.7, K+ 3.3. AF, VSS. Intake/Output Summary (Last 24 hours) at 09/09/18 0809  Last data filed at 09/09/18 0342   Gross per 24 hour   Intake             1056 ml   Output             1900 ml   Net             -844 ml     OBJECTIVE:  Constitutional: Alert oriented cooperative patient in no acute distress; appears stated age   Visit Vitals    /78 (BP 1 Location: Right arm, BP Patient Position: At rest)    Pulse 68    Temp 98.7 °F (37.1 °C)    Resp 17    Wt 105 lb 8 oz (47.9 kg)    SpO2 98%    BMI 16.52 kg/m2     Eyes:Sclera are clear.    ENMT: no external lesions gross hearing normal; no obvious neck masses, no ear or lip lesions  CV: Normal perfusion  Resp: No JVD. Breathing is  non-labored; no audible wheezing. GI: soft and non-distended, aquacel dressing c/d/i, BS active  Musculoskeletal: unremarkable with normal function. No embolic signs or cyanosis.    Neuro:  Oriented; moves all 4; no focal deficits  Psychiatric: normal affect and mood, no memory impairment      Patient Vitals for the past 24 hrs:   BP Temp Pulse Resp SpO2   09/09/18 0342 135/78 98.7 °F (37.1 °C) 68 17 98 %   09/08/18 2355 114/67 98.1 °F (36.7 °C) 80 17 98 %   09/08/18 1955 128/79 98 °F (36.7 °C) 62 15 92 %   09/08/18 1525 137/72 97.6 °F (36.4 °C) (!) 46 15 99 %   09/08/18 1122 166/78 97.5 °F (36.4 °C) 72 18 100 %     Labs:    Recent Labs      09/09/18   0429   WBC  5.7   HGB  10.6*   PLT  248   NA  143   K  3.3*   CL  108*   CO2  29   BUN  5*   CREA  0.80   GLU  86       Signed:  MARIANA Puente-BC

## 2018-09-10 LAB
ANION GAP SERPL CALC-SCNC: 10 MMOL/L (ref 7–16)
BUN SERPL-MCNC: 4 MG/DL (ref 8–23)
CALCIUM SERPL-MCNC: 8.5 MG/DL (ref 8.3–10.4)
CHLORIDE SERPL-SCNC: 106 MMOL/L (ref 98–107)
CO2 SERPL-SCNC: 26 MMOL/L (ref 21–32)
CREAT SERPL-MCNC: 0.82 MG/DL (ref 0.6–1)
ERYTHROCYTE [DISTWIDTH] IN BLOOD BY AUTOMATED COUNT: 15.4 %
GLUCOSE SERPL-MCNC: 78 MG/DL (ref 65–100)
HCT VFR BLD AUTO: 32.9 % (ref 35.8–46.3)
HGB BLD-MCNC: 10.5 G/DL (ref 11.7–15.4)
MAGNESIUM SERPL-MCNC: 2 MG/DL (ref 1.8–2.4)
MCH RBC QN AUTO: 25.7 PG (ref 26.1–32.9)
MCHC RBC AUTO-ENTMCNC: 31.9 G/DL (ref 31.4–35)
MCV RBC AUTO: 80.6 FL (ref 79.6–97.8)
NRBC # BLD: 0 K/UL (ref 0–0.2)
PHOSPHATE SERPL-MCNC: 3.4 MG/DL (ref 2.3–3.7)
PLATELET # BLD AUTO: 238 K/UL (ref 150–450)
PMV BLD AUTO: 11.5 FL (ref 9.4–12.3)
POTASSIUM SERPL-SCNC: 3.5 MMOL/L (ref 3.5–5.1)
RBC # BLD AUTO: 4.08 M/UL (ref 4.05–5.2)
SODIUM SERPL-SCNC: 142 MMOL/L (ref 136–145)
WBC # BLD AUTO: 5.6 K/UL (ref 4.3–11.1)

## 2018-09-10 PROCEDURE — 74011000258 HC RX REV CODE- 258: Performed by: SURGERY

## 2018-09-10 PROCEDURE — 74011250636 HC RX REV CODE- 250/636: Performed by: NURSE PRACTITIONER

## 2018-09-10 PROCEDURE — 65270000029 HC RM PRIVATE

## 2018-09-10 PROCEDURE — 74011250636 HC RX REV CODE- 250/636: Performed by: SURGERY

## 2018-09-10 PROCEDURE — 83735 ASSAY OF MAGNESIUM: CPT

## 2018-09-10 PROCEDURE — 74011250637 HC RX REV CODE- 250/637: Performed by: SURGERY

## 2018-09-10 PROCEDURE — 97530 THERAPEUTIC ACTIVITIES: CPT

## 2018-09-10 PROCEDURE — 97110 THERAPEUTIC EXERCISES: CPT

## 2018-09-10 PROCEDURE — 74011250637 HC RX REV CODE- 250/637: Performed by: NURSE PRACTITIONER

## 2018-09-10 PROCEDURE — 74011000250 HC RX REV CODE- 250: Performed by: SURGERY

## 2018-09-10 PROCEDURE — 80048 BASIC METABOLIC PNL TOTAL CA: CPT

## 2018-09-10 PROCEDURE — 36415 COLL VENOUS BLD VENIPUNCTURE: CPT

## 2018-09-10 PROCEDURE — 85027 COMPLETE CBC AUTOMATED: CPT

## 2018-09-10 PROCEDURE — 84100 ASSAY OF PHOSPHORUS: CPT

## 2018-09-10 RX ORDER — METHIMAZOLE 5 MG/1
10 TABLET ORAL DAILY
Status: DISCONTINUED | OUTPATIENT
Start: 2018-09-10 | End: 2018-09-11 | Stop reason: HOSPADM

## 2018-09-10 RX ADMIN — HEPARIN SODIUM 5000 UNITS: 5000 INJECTION INTRAVENOUS; SUBCUTANEOUS at 22:00

## 2018-09-10 RX ADMIN — HEPARIN SODIUM 5000 UNITS: 5000 INJECTION INTRAVENOUS; SUBCUTANEOUS at 08:21

## 2018-09-10 RX ADMIN — ONDANSETRON 4 MG: 2 INJECTION INTRAMUSCULAR; INTRAVENOUS at 05:23

## 2018-09-10 RX ADMIN — ONDANSETRON 4 MG: 2 INJECTION INTRAMUSCULAR; INTRAVENOUS at 01:42

## 2018-09-10 RX ADMIN — ALPRAZOLAM 0.5 MG: 0.5 TABLET ORAL at 11:00

## 2018-09-10 RX ADMIN — CARBIDOPA AND LEVODOPA 2 TABLET: 25; 100 TABLET ORAL at 14:23

## 2018-09-10 RX ADMIN — DEXTROSE MONOHYDRATE, SODIUM CHLORIDE, AND POTASSIUM CHLORIDE 25 ML/HR: 50; 4.5; 1.49 INJECTION, SOLUTION INTRAVENOUS at 06:35

## 2018-09-10 RX ADMIN — OXYCODONE AND ACETAMINOPHEN 1 TABLET: 5; 325 TABLET ORAL at 16:15

## 2018-09-10 RX ADMIN — OXYCODONE AND ACETAMINOPHEN 1 TABLET: 5; 325 TABLET ORAL at 01:42

## 2018-09-10 RX ADMIN — CARBIDOPA AND LEVODOPA 1 TABLET: 25; 100 TABLET ORAL at 06:36

## 2018-09-10 RX ADMIN — CARBIDOPA AND LEVODOPA 1 TABLET: 25; 100 TABLET, ORALLY DISINTEGRATING ORAL at 01:42

## 2018-09-10 RX ADMIN — PREGABALIN 50 MG: 25 CAPSULE ORAL at 08:20

## 2018-09-10 RX ADMIN — CARBIDOPA AND LEVODOPA 2 TABLET: 25; 100 TABLET ORAL at 21:34

## 2018-09-10 RX ADMIN — MIDODRINE HYDROCHLORIDE 2.5 MG: 5 TABLET ORAL at 08:22

## 2018-09-10 RX ADMIN — CARBIDOPA AND LEVODOPA 2 TABLET: 25; 100 TABLET ORAL at 17:24

## 2018-09-10 RX ADMIN — CARBIDOPA AND LEVODOPA 1 TABLET: 25; 100 TABLET ORAL at 03:47

## 2018-09-10 RX ADMIN — CARBIDOPA AND LEVODOPA 2 TABLET: 25; 100 TABLET ORAL at 08:29

## 2018-09-10 RX ADMIN — CEFTRIAXONE SODIUM 1 G: 1 INJECTION, POWDER, FOR SOLUTION INTRAMUSCULAR; INTRAVENOUS at 10:58

## 2018-09-10 RX ADMIN — ACETAMINOPHEN 650 MG: 325 TABLET ORAL at 13:35

## 2018-09-10 RX ADMIN — PREGABALIN 50 MG: 25 CAPSULE ORAL at 17:23

## 2018-09-10 RX ADMIN — FAMOTIDINE 20 MG: 10 INJECTION, SOLUTION INTRAVENOUS at 16:15

## 2018-09-10 RX ADMIN — ACETAMINOPHEN 650 MG: 325 TABLET ORAL at 22:00

## 2018-09-10 RX ADMIN — METHIMAZOLE 10 MG: 5 TABLET ORAL at 11:00

## 2018-09-10 NOTE — PROGRESS NOTES
PLAN:  Continue GI Soft Diet  Pathology noted  Culture + for E.Coli; on Rocephin   Decrease IVF D5 1/2 NS w 20KCL   Entereg x 7 days   SCD/IS/Heparin/Pepcid for prophylactic measures  On PTA meds for Parkinsons   OOB and ambulate  Pain control  Monitor labs -replace electrolytes prn  Plan for SNF Tues/Weds    ASSESSMENT:  Admit Date: 9/5/2018   5 Day Post-Op  Procedure(s):  OPEN SIGMOID COLECTOMY WITH EEA  ANASTOMOSIS, DRAINAGE OF LOCALIZED ABSCESS, MOBILIZATION OF SPLENIC FLEXURE    Cystoscopy and bilateral ureteral catheter placement. FIRST PROCEDURE    Principal Problem:    Diverticulitis of large intestine with abscess (9/5/2018)    Active Problems:    Parkinsonism ([address] (8/10/2015)      Parkinson's disease ([facility] [address] ()      Neurologic orthostatic hypotension ([facility] [address] (9/6/2016)         HPI:Essence Domingo is a 76 y.o. female who returns following EGD/colonoscopy by Dr. Mason Giron in early August and a follow-up CT scan performed on August 20. I reviewed those reports and images from the CT scan. She has continued to have recurrent bouts of abdominal pain throughout this time. She has had multiple visits to the emergency room. She has been placed on antibiotics multiple times. It is apparent that she has chronic recurrent sigmoid diverticulitis. There may also be other things going on with her colon. The CT did show some colitis in the ascending colon. Her complaints are primarily left lower quadrant. She does have some generalized complaints as well. She was placed on Megace which seems to be increasing her appetite. She was present with a caregiver today who says that she will actually eat through the night. She was also placed on mesalamine but she is having difficulty swallowing that pill. She is currently afebrile. She reports having a bowel movement once every 2 weeks. The caregiver corrected her since said that she is actually having fairly regular bowel movements almost daily this week.   She is taking MiraLAX. She is still complaining of hard stools and may need to adjust that dosing.     I had seen her in January 2018 in the office for 4 week follow-up of her complicated diverticulitis. I also saw her in December 2017. A few days after that visit she went to the emergency department for continued complaints of abdominal pain. A follow-up CT scan was performed which shows complete resolution of her diverticulitis and abscess cavity. Her all of her blood work was normal.  She was supposed to have an appointment with Dr. Husam Hopkins last week but this was canceled due to the snowy conditions. She will set up a repeat appointment with him. She still has many aches and pains. These appear to be chronic. I reviewed her new CT scan and attached the corresponding image from prior image with abscess. This shows resolution and only chronic other findings.     On last visit in December 2017, she comes in not feeling well. She complains of loss of appetite, nausea, abdominal and chest pain. She was seen by Dr. Husam Hopkins within the past 2 days. I was able to speak with him as well. He ordered blood work which was all without abnormality. He did order a repeat CT scan which will be performed next week. If that scan has remained normalized then he will proceed with colonoscopy. She denies fevers but she does report some chills. She says she has lost additional weight. Her weight today is 111 pounds and was 114 pounds 4 weeks ago. She claims her normal weight is 135 pounds. When I saw her frequent 4 weeks ago she had just started a 30 day course of Cipro and Flagyl. She claims that she started developing some rash and swelling 2 days ago and stopped the antibiotics at that time but should have nearly completed that course.     She was referred by Dr. Hus[facility] of [facility] for evaluation of diverticulitis with peridiverticular abscess.   Patient is very pleasant woman who presents with a caregiver who she uses due to Parkinson's disease. She moved from [place] in 2015. She was diagnosed with acute diverticulitis with peridiverticular abscess on September 15, 2017. This was at [facility] and she was managed with antibiotics. The originally planned to place a percutaneous drain but that was not required. I do not have access to that CT scan. She had follow-up CT scans one month later on October 16 and then again more recently on October 27. Both of those studies showed continued improvement of the size of the peridiverticular abscess which originally was close to 3 cm then decreased to 1.6 cm and now on the recent study is 1.1 cm and may actually represent a large diverticulum or a resolving abscess. There is very little evidence of residual inflammation. She continues to have some abdominal symptoms but she is not having any fevers or severe abdominal pain. She has chronic constipation and does take a dose of MiraLAX per day. She still has very hard balls for stool. She has not seen any ribbon stools because of these ball-like stools. She had been on Augmentin prior to recent visit with Dr. Emile Bolden. He saw her 2 days ago on November 15 and change her antibiotics from Augmentin to Cipro and Flagyl. He placed her on a 30 day regimen. In further questioning she is taking medications with her MiraLAX. I discussed with her that this may actually affect the absorption of any of her medications as the water bound to MiraLAX is a reversible and must exit the body via the anus. This will frequently capture medications as well and that practice of taking any medications within one half hour of MiraLAX is discouraged. She has one prior episode in her history of diverticulitis. This was in 2015 weeks she was still living in [place]. Similarly she had a peridiverticular abscess that did not require drainage and was treated with antibiotics.   Her last colonoscopy was in 2014 or 2015 prior to her episode of diverticulitis. She was noted to have diverticulosis at that time according to her. She has had upper endoscopies type Dr. Ary De Jesus recently. These were unremarkable. I have believe he is planning on doing a colonoscopy once there is resolution of her diverticulitis. My usual recommendation is no sooner than 6 weeks to 3 months following resolution. This is done sooner if there is a suspicion of malignancy. Her abdominal surgical history is fairly insignificant. She has had a laparoscopic cholecystectomy as well as bilateral tubal ligation. She has not had any colon procedures.     She has noted allergies to oral and IV iodinated contrast as well as metronidazole. All of these have been used recently or currently and seem to be without issue. SUBJECTIVE:  9/6/18: POD1: Awake in bed. Reports adequate pain control. WBC 12.8, Lytes stable. AF, VSS. 1.7L UOP. -flatus. 9/7/18 POD 2: Awake, alert, + flatus, -BM. -N/V.  WBC 9.4, K+ 3.4. AF, VSS. Cultures and path have returned. 9/8/18 POD 3: Awake, alert, + flatus, +BM. Tolerating clears and requesting more to eat. No N/V. Voiding without difficulty. WBC 6.8, K+ 3.6. AF, VSS.   9/9/18 POD 4: Sitting in recliner, alert, + flatus, +BM. Tolerating full liquids and requesting more to eat. No N/V. Voiding without difficulty. WBC 5.7, K+ 3.3. AF, VSS.  9/10/18 POD 5: Awake in bed, +flatus/+BM. Tolerating diet. Mild nausea this AM. AF. Labs stable.         Intake/Output Summary (Last 24 hours) at 09/10/18 0756  Last data filed at 09/10/18 0504   Gross per 24 hour   Intake              555 ml   Output             1700 ml   Net            -1145 ml     OBJECTIVE:  Constitutional: Alert oriented cooperative patient in no acute distress; appears stated age   Visit Vitals    /75 (BP 1 Location: Right arm, BP Patient Position: At rest)    Pulse 60    Temp 98.8 °F (37.1 °C)    Resp 18    Wt 105 lb 8 oz (47.9 kg)    SpO2 100%    BMI 16.52 kg/m2 Eyes:Sclera are clear. ENMT: no external lesions gross hearing normal; no obvious neck masses, no ear or lip lesions  CV: Normal perfusion  Resp: No JVD. Breathing is  non-labored; no audible wheezing. GI: soft and non-distended, aquacel dressing c/d/i, BS active  Musculoskeletal: unremarkable with normal function. No embolic signs or cyanosis.    Neuro:  Oriented; moves all 4; no focal deficits  Psychiatric: normal affect and mood, no memory impairment      Patient Vitals for the past 24 hrs:   BP Temp Pulse Resp SpO2   09/10/18 0320 144/75 98.8 °F (37.1 °C) 60 18 100 %   09/09/18 2310 122/69 98.6 °F (37 °C) 66 18 98 %   09/09/18 1936 171/85 98.7 °F (37.1 °C) 80 18 100 %   09/09/18 1503 117/64 98.3 °F (36.8 °C) (!) 49 18 98 %   09/09/18 1121 131/72 98.5 °F (36.9 °C) 69 18 100 %     Labs:    Recent Labs      09/10/18   0423   WBC  5.6   HGB  10.5*   PLT  238   NA  142   K  3.5   CL  106   CO2  26   BUN  4*   CREA  0.82   GLU  78       Signed:  BRONWYN CarmichaelBC

## 2018-09-10 NOTE — INTERDISCIPLINARY ROUNDS
Interdisciplinary team rounds were held 9/10/2018 with the following team members:Care Management and Nursing and the patient. Plan of care discussed. See clinical pathway and/or care plan for interventions and desired outcomes.

## 2018-09-10 NOTE — PROGRESS NOTES
END OF SHIFT NOTE:    INTAKE/OUTPUT  09/09 0701 - 09/10 0700  In: 579 [I.V.:857]  Out: 2000 [Urine:2000]  Voiding: YES  Catheter: NO  Drain:              Flatus: Patient does have flatus present. Stool:  1 occurrences. Characteristics:  Stool Assessment  Stool Color: Red streaks  Stool Appearance: Soft  Stool Amount: Small  Stool Source/Status: Rectum    Emesis: 0 occurrences. Characteristics:        VITAL SIGNS  Patient Vitals for the past 12 hrs:   Temp Pulse Resp BP SpO2   09/10/18 1800 98.6 °F (37 °C) 74 17 93/61 98 %   09/10/18 1440 97.2 °F (36.2 °C) 90 16 146/75 99 %   09/10/18 0822 97.2 °F (36.2 °C) 70 - 105/53 -       Pain Assessment  Pain Intensity 1: 0 (09/10/18 1304)  Pain Location 1: Abdomen  Pain Intervention(s) 1: Medication (see MAR)  Patient Stated Pain Goal: 0    Ambulating  Yes    Shift report given to oncoming nurse at the bedside.     Farooq Varner RN

## 2018-09-10 NOTE — PROGRESS NOTES
END OF SHIFT NOTE:    INTAKE/OUTPUT  09/09 0701 - 09/10 0700  In: 044 [I.V.:857]  Out: 2000 [Urine:2000]  Voiding: YES  Catheter: NO  Drain:              Flatus: Patient does have flatus present. Stool:  0 occurrences. Emesis: 0 occurrences. Characteristics:        VITAL SIGNS  Patient Vitals for the past 12 hrs:   Temp Pulse Resp BP SpO2   09/10/18 0320 98.8 °F (37.1 °C) 60 18 144/75 100 %   09/09/18 2310 98.6 °F (37 °C) 66 18 122/69 98 %   09/09/18 1936 98.7 °F (37.1 °C) 80 18 171/85 100 %       Pain Assessment  Pain Intensity 1: 8 (09/10/18 0142)  Pain Location 1: Abdomen  Pain Intervention(s) 1: Medication (see MAR)  Patient Stated Pain Goal: 0    Ambulating  Yes    Shift report given to oncoming nurse at the bedside.     Jamaal Acevedo RN

## 2018-09-10 NOTE — PROGRESS NOTES
Problem: Mobility Impaired (Adult and Pediatric)  Goal: *Acute Goals and Plan of Care (Insert Text)  LTG:  (1.)Ms. Pratima Gutiérrez will move from supine to sit and sit to supine , scoot up and down and roll side to side in bed with MODIFIED INDEPENDENCE within 7 treatment day(s). (2.)Ms. Pratima Gutiérrez will transfer from bed to chair and chair to bed with MODIFIED INDEPENDENCE using the least restrictive device within 7 treatment day(s). (3.)Ms. Pratima Gutiérrez will ambulate with MODIFIED INDEPENDENCE for 250 feet with the least restrictive device within 7 treatment day(s). (4.)Ms. Pratima Gutiérrez will participate in therapeutic activity/exerices x 23 minutes for increased strength within 7 days. ________________________________________________________________________________________________    PHYSICAL THERAPY: Daily Note, Treatment Day: 1st, PM 9/10/2018  INPATIENT: Hospital Day: 6  [place] / Product Type: Medicare /      NAME/AGE/GENDER: Grabiel Zhou is a 76 y.o. female   PRIMARY DIAGNOSIS: Diverticulitis of intestine without bleeding, unspecified complication status, unspecified part of intestinal tract [K57.92] Diverticulitis of large intestine with abscess Diverticulitis of large intestine with abscess  Procedure(s) (LRB):  OPEN SIGMOID COLECTOMY WITH EEA  ANASTOMOSIS, DRAINAGE OF LOCALIZED ABSCESS, MOBILIZATION OF SPLENIC FLEXURE  (N/A)   Cystoscopy and bilateral ureteral catheter placement. FIRST PROCEDURE (Bilateral)  5 Days Post-Op  ICD-10: Treatment Diagnosis:    · Generalized Muscle Weakness (M62.81)  · Other abnormalities of gait and mobility (R26.89)  · History of falling (Z91.81)   Precaution/Allergies:  Flagyl [metronidazole]; Aspirin; Contrast agent [iodine]; Gabapentin; Gluten; Milk containing products; Morphine; Omnipaque rediflo [iohexol]; and Red dye      ASSESSMENT:     Ms. Pratima Gutiérrez is sitting in chair on arrival, agreeable to PT treatment.  Pt on room air, states no pain presently. Pt performed transfers with CGA, ambulated into hallway for total of 500' with RW and SBA to CGA, 1 short rest break after 250'. Pt then performed seated there ex B LEs with CGA to SBA. Pt reports that she normally ambulates at a faster swapna and has just recently began to use the walker for stability. PT to cont to follow for acute care needs. Pt making progress toward goals. This section established at most recent assessment   PROBLEM LIST (Impairments causing functional limitations):  1. Decreased Strength  2. Decreased Transfer Abilities  3. Decreased Ambulation Ability/Technique  4. Decreased Balance   INTERVENTIONS PLANNED: (Benefits and precautions of physical therapy have been discussed with the patient.)  1. Balance Exercise  2. Bed Mobility  3. Family Education  4. Gait Training  5. Therapeutic Activites  6. Therapeutic Exercise/Strengthening  7. Transfer Training  8. Group Therapy     TREATMENT PLAN: Frequency/Duration: 3 times a week for duration of hospital stay  Rehabilitation Potential For Stated Goals: Good     RECOMMENDED REHABILITATION/EQUIPMENT: (at time of discharge pending progress): Due to the probability of continued deficits (see above) this patient will likely need continued skilled physical therapy after discharge. Equipment:    None at this time              HISTORY:   History of Present Injury/Illness (Reason for Referral):  See H&P  Past Medical History/Comorbidities:   Ms. Farhan Villatoro  has a past medical history of Abnormal gait (7/5/2016); Acute serous otitis media of left ear; Anxiety; Bilateral carpal tunnel syndrome (7/5/2016); Chronic abdominal pain (7/5/2016); Dementia due to Parkinson's disease without behavioral disturbance ([facility] [address] (7/5/2016); Depression; Diverticulitis; Gastritis; GERD (gastroesophageal reflux disease); Heart murmur; Hypercholesterolemia; Hypertension; Neuropathic pain (7/5/2016); Nontoxic multinodular goiter (7/5/2016);  Orthostatic hypotension; Parkinson's disease ([facility] [address] Peripheral neuropathy; Syncope (10/10/2015); Type 2 diabetes mellitus without complication (1/5/4838); Urinary frequency (7/5/2016); Vaginal bleeding; and Weight loss, unintentional. She also has no past medical history of Difficult intubation; Malignant hyperthermia due to anesthesia; or Pseudocholinesterase deficiency. Ms. Faustina Mcelroy  has a past surgical history that includes hx cholecystectomy (2001); hx tubal ligation (1974); hx appendectomy (1974); and colonoscopy (N/A, 8/6/2018). Social History/Living Environment:   Home Environment: Private residence  One/Two Story Residence: One story  Living Alone: Yes  Support Systems: Home care staff  Patient Expects to be Discharged to[de-identified] Unknown  Current DME Used/Available at Home: Walker, rolling, Wheelchair, Shower chair, Grab bars  Tub or Shower Type: Tub/Shower combination  Prior Level of Function/Work/Activity:  Pt reported that she lives alone but has an aide daily come for several hours who assists with ADLs. Pt stated she ambulated with RW PTA with several recent falls.      Number of Personal Factors/Comorbidities that affect the Plan of Care: 3+: HIGH COMPLEXITY   EXAMINATION:   Most Recent Physical Functioning:   Gross Assessment:                  Posture:  Posture (WDL): Exceptions to WDL  Posture Assessment: Rounded shoulders  Balance:  Sitting: Intact  Standing: Impaired  Standing - Static: Good;Fair  Standing - Dynamic : Fair Bed Mobility:  Supine to Sit:  (in chair on arrival)  Sit to Supine:  (left up in chair post session)  Wheelchair Mobility:     Transfers:  Sit to Stand: Contact guard assistance  Stand to Sit: Contact guard assistance  Gait:     Base of Support: Narrowed  Speed/Angela: Slow  Step Length: Left shortened;Right shortened  Swing Pattern: Left symmetrical;Right symmetrical  Gait Abnormalities: Decreased step clearance  Distance (ft): 500 Feet (ft) (250' x 2 after short rest break)  Assistive Device: Walker, rolling  Ambulation - Level of Assistance: Contact guard assistance  Interventions: Verbal cues      Body Structures Involved:  1. Digestive Structures  2. Muscles Body Functions Affected:  1. Neuromusculoskeletal  2. Movement Related  3. Digestive Activities and Participation Affected:  1. General Tasks and Demands  2. Mobility  3. Community, Social and [place]   Number of elements that affect the Plan of Care: 4+: HIGH COMPLEXITY   CLINICAL PRESENTATION:   Presentation: Stable and uncomplicated: LOW COMPLEXITY   CLINICAL DECISION MAKIN [facility] Inpatient Short Form  How much difficulty does the patient currently have. .. Unable A Lot A Little None   1. Turning over in bed (including adjusting bedclothes, sheets and blankets)? [] 1   [] 2   [] 3   [x] 4   2. Sitting down on and standing up from a chair with arms ( e.g., wheelchair, bedside commode, etc.)   [] 1   [] 2   [x] 3   [] 4   3. Moving from lying on back to sitting on the side of the bed? [] 1   [] 2   [] 3   [x] 4   How much help from another person does the patient currently need. .. Total A Lot A Little None   4. Moving to and from a bed to a chair (including a wheelchair)? [] 1   [] 2   [x] 3   [] 4   5. Need to walk in hospital room? [] 1   [] 2   [x] 3   [] 4   6. Climbing 3-5 steps with a railing? [] 1   [] 2   [x] 3   [] 4   © , Trustees of [facility] [employer], under license to [employer]. All rights reserved      Score:  Initial: 20 Most Recent: X (Date: -- )    Interpretation of Tool:  Represents activities that are increasingly more difficult (i.e. Bed mobility, Transfers, Gait). Score 24 23 22-20 19-15 14-10 9-7 6     Modifier CH CI CJ CK CL CM CN      ?  Mobility - Walking and Moving Around:     - CURRENT STATUS: CJ - 20%-39% impaired, limited or restricted    - GOAL STATUS: CI - 1%-19% impaired, limited or restricted    - D/C STATUS:  ---------------To be determined---------------  [place] / [place] / Product Type: Medicare /      Medical Necessity:     · Patient demonstrates good rehab potential due to higher previous functional level. Reason for Services/Other Comments:  · Patient continues to require skilled intervention due to decreased balance and functional mobility. Use of outcome tool(s) and clinical judgement create a POC that gives a: Clear prediction of patient's progress: LOW COMPLEXITY            TREATMENT:   (In addition to Assessment/Re-Assessment sessions the following treatments were rendered)   Pre-treatment Symptoms/Complaints:  \"I would like to walk\"  Pain: Initial:   Pain Intensity 1: 0  Post Session:  0/10 post ambulation and activity     Therapeutic Activity: (    13 min): Therapeutic activities including Chair transfers, Ambulation on level ground and walker safety and mobility to improve mobility, strength, balance and coordination. Required minimal Verbal cues to promote static and dynamic balance in standing and promote coordination of bilateral, lower extremity(s). Therapeutic Exercise: (  10 minutes):  Exercises per grid below to improve mobility and strength. Required minimal visual and verbal cues to promote proper body alignment and promote proper body mechanics. Progressed repetitions and complexity of movement as indicated.        Date:  9/7/18 Date:  9/10/18 Date:     ACTIVITY/EXERCISE AM PM AM PM AM PM   Seated LAQ 3 x 25 B   X 20 B      Seated marching 3 x 25 B   X 20 B     Seated AP 3 x 25 B   X 20 B     Seated hip abd/add 2 x 25   X  20 B                                B = bilateral; AA = active assistive; A = active; P = passive    Braces/Orthotics/Lines/Etc:   · IV  Treatment/Session Assessment:    · Response to Treatment:  Tolerated well, improved mobility  · Interdisciplinary Collaboration:   o Physical Therapist  o Registered Nurse  · After treatment position/precautions:   o Up in chair  o Bed/Chair-wheels locked  o Bed in low position  o Call light within reach   · Compliance with Program/Exercises: Will assess as treatment progresses. · Recommendations/Intent for next treatment session: \"Next visit will focus on advancements to more challenging activities and reduction in assistance provided\".   Total Treatment Duration:  PT Patient Time In/Time Out  Time In: 1304  Time Out: 201 Garth Mack, PT

## 2018-09-10 NOTE — PROGRESS NOTES
Nutrition F/U  Pt voices no questions concerning diet and more so wanted to communicate her food intolerance os gluten and dairy. These are already noted in her EMR. F/U per nutrition standard of care.   Travis Coker, [address] [place] [address], [address] [place] [address]

## 2018-09-10 NOTE — PROGRESS NOTES
PT note: Patient receiving bath presently. Will attempt PT treatment later as time and schedule allow. Thank you.   Elizabeth Capps, PT  9/10/2018

## 2018-09-11 VITALS
TEMPERATURE: 98.2 F | BODY MASS INDEX: 16.52 KG/M2 | OXYGEN SATURATION: 100 % | WEIGHT: 105.5 LBS | HEART RATE: 81 BPM | RESPIRATION RATE: 17 BRPM | DIASTOLIC BLOOD PRESSURE: 68 MMHG | SYSTOLIC BLOOD PRESSURE: 102 MMHG

## 2018-09-11 LAB
ANION GAP SERPL CALC-SCNC: 9 MMOL/L (ref 7–16)
BUN SERPL-MCNC: 11 MG/DL (ref 8–23)
CALCIUM SERPL-MCNC: 8.4 MG/DL (ref 8.3–10.4)
CHLORIDE SERPL-SCNC: 107 MMOL/L (ref 98–107)
CO2 SERPL-SCNC: 27 MMOL/L (ref 21–32)
CREAT SERPL-MCNC: 0.84 MG/DL (ref 0.6–1)
ERYTHROCYTE [DISTWIDTH] IN BLOOD BY AUTOMATED COUNT: 15.6 %
GLUCOSE SERPL-MCNC: 82 MG/DL (ref 65–100)
HCT VFR BLD AUTO: 31.3 % (ref 35.8–46.3)
HGB BLD-MCNC: 9.9 G/DL (ref 11.7–15.4)
MAGNESIUM SERPL-MCNC: 2 MG/DL (ref 1.8–2.4)
MCH RBC QN AUTO: 25.4 PG (ref 26.1–32.9)
MCHC RBC AUTO-ENTMCNC: 31.6 G/DL (ref 31.4–35)
MCV RBC AUTO: 80.3 FL (ref 79.6–97.8)
NRBC # BLD: 0 K/UL (ref 0–0.2)
PHOSPHATE SERPL-MCNC: 3.2 MG/DL (ref 2.3–3.7)
PLATELET # BLD AUTO: 257 K/UL (ref 150–450)
PMV BLD AUTO: 11.2 FL (ref 9.4–12.3)
POTASSIUM SERPL-SCNC: 3.4 MMOL/L (ref 3.5–5.1)
RBC # BLD AUTO: 3.9 M/UL (ref 4.05–5.2)
SODIUM SERPL-SCNC: 143 MMOL/L (ref 136–145)
WBC # BLD AUTO: 8.2 K/UL (ref 4.3–11.1)

## 2018-09-11 PROCEDURE — 74011000258 HC RX REV CODE- 258: Performed by: SURGERY

## 2018-09-11 PROCEDURE — 74011250637 HC RX REV CODE- 250/637: Performed by: NURSE PRACTITIONER

## 2018-09-11 PROCEDURE — 36415 COLL VENOUS BLD VENIPUNCTURE: CPT

## 2018-09-11 PROCEDURE — 80048 BASIC METABOLIC PNL TOTAL CA: CPT

## 2018-09-11 PROCEDURE — 74011250636 HC RX REV CODE- 250/636: Performed by: NURSE PRACTITIONER

## 2018-09-11 PROCEDURE — 74011250637 HC RX REV CODE- 250/637: Performed by: SURGERY

## 2018-09-11 PROCEDURE — 74011250636 HC RX REV CODE- 250/636: Performed by: SURGERY

## 2018-09-11 PROCEDURE — 83735 ASSAY OF MAGNESIUM: CPT

## 2018-09-11 PROCEDURE — 85027 COMPLETE CBC AUTOMATED: CPT

## 2018-09-11 PROCEDURE — 90686 IIV4 VACC NO PRSV 0.5 ML IM: CPT | Performed by: SURGERY

## 2018-09-11 PROCEDURE — 84100 ASSAY OF PHOSPHORUS: CPT

## 2018-09-11 PROCEDURE — 77030027138 HC INCENT SPIROMETER -A

## 2018-09-11 PROCEDURE — 90471 IMMUNIZATION ADMIN: CPT

## 2018-09-11 RX ORDER — OXYCODONE AND ACETAMINOPHEN 5; 325 MG/1; MG/1
1 TABLET ORAL
Qty: 30 TAB | Refills: 0 | Status: SHIPPED | OUTPATIENT
Start: 2018-09-11 | End: 2018-09-16

## 2018-09-11 RX ORDER — CEFDINIR 300 MG/1
300 CAPSULE ORAL 2 TIMES DAILY
Qty: 14 CAP | Refills: 0 | Status: SHIPPED | OUTPATIENT
Start: 2018-09-11 | End: 2018-09-18

## 2018-09-11 RX ORDER — ALPRAZOLAM 0.5 MG/1
0.5 TABLET ORAL
Qty: 14 TAB | Refills: 0 | Status: SHIPPED | OUTPATIENT
Start: 2018-09-11 | End: 2020-04-28 | Stop reason: SDUPTHER

## 2018-09-11 RX ORDER — PREGABALIN 50 MG/1
50 CAPSULE ORAL 2 TIMES DAILY
Qty: 15 CAP | Refills: 0 | Status: SHIPPED | OUTPATIENT
Start: 2018-09-11 | End: 2019-04-12

## 2018-09-11 RX ADMIN — MIDODRINE HYDROCHLORIDE 2.5 MG: 5 TABLET ORAL at 08:06

## 2018-09-11 RX ADMIN — CEFTRIAXONE SODIUM 1 G: 1 INJECTION, POWDER, FOR SOLUTION INTRAMUSCULAR; INTRAVENOUS at 10:37

## 2018-09-11 RX ADMIN — METHIMAZOLE 10 MG: 5 TABLET ORAL at 08:05

## 2018-09-11 RX ADMIN — CARBIDOPA AND LEVODOPA 2 TABLET: 25; 100 TABLET ORAL at 11:29

## 2018-09-11 RX ADMIN — CARBIDOPA AND LEVODOPA 1 TABLET: 25; 100 TABLET, ORALLY DISINTEGRATING ORAL at 00:31

## 2018-09-11 RX ADMIN — OXYCODONE AND ACETAMINOPHEN 1 TABLET: 5; 325 TABLET ORAL at 00:30

## 2018-09-11 RX ADMIN — ONDANSETRON 4 MG: 2 INJECTION INTRAMUSCULAR; INTRAVENOUS at 01:03

## 2018-09-11 RX ADMIN — CARBIDOPA AND LEVODOPA 2 TABLET: 25; 100 TABLET ORAL at 06:43

## 2018-09-11 RX ADMIN — ALPRAZOLAM 0.5 MG: 0.5 TABLET ORAL at 04:38

## 2018-09-11 RX ADMIN — ONDANSETRON 4 MG: 2 INJECTION INTRAMUSCULAR; INTRAVENOUS at 04:36

## 2018-09-11 RX ADMIN — HEPARIN SODIUM 5000 UNITS: 5000 INJECTION INTRAVENOUS; SUBCUTANEOUS at 08:04

## 2018-09-11 RX ADMIN — INFLUENZA VIRUS VACCINE 0.5 ML: 15; 15; 15; 15 SUSPENSION INTRAMUSCULAR at 11:56

## 2018-09-11 RX ADMIN — PREGABALIN 50 MG: 25 CAPSULE ORAL at 08:06

## 2018-09-11 RX ADMIN — CARBIDOPA AND LEVODOPA 2 TABLET: 25; 100 TABLET ORAL at 08:04

## 2018-09-11 RX ADMIN — MEGESTROL ACETATE 200 MG: 40 SUSPENSION ORAL at 08:10

## 2018-09-11 RX ADMIN — CARBIDOPA AND LEVODOPA 1 TABLET: 25; 100 TABLET ORAL at 04:41

## 2018-09-11 NOTE — PROGRESS NOTES
TRANSFER - OUT REPORT:    Verbal report given to Svitlana on Luis A Salazar  being transferred to [facility] for routine progression of care       Report consisted of patients Situation, Background, Assessment and   Recommendations(SBAR). Information from the following report(s) Kardex was reviewed with the receiving nurse. Lines:   Peripheral IV 09/07/18 Right Arm (Active)   Site Assessment Clean, dry, & intact 9/11/2018  8:15 AM   Phlebitis Assessment 0 9/11/2018  8:15 AM   Infiltration Assessment 0 9/11/2018  8:15 AM   Dressing Status Clean, dry, & intact 9/11/2018  8:15 AM   Dressing Type Transparent 9/11/2018  8:15 AM   Hub Color/Line Status Infusing 9/11/2018  8:15 AM        Opportunity for questions and clarification was provided.       Patient transported with:   [employer]

## 2018-09-11 NOTE — PROGRESS NOTES
4502 y 951 ambulance transport arranged for 1:45 pm to [facility] for short-term rehab, room 206W, report [phone]. This plan is ok with Ms. Trujillo in room 201 and with MAGY Doan.

## 2018-09-11 NOTE — DISCHARGE SUMMARY
[facility] [address]  [place], [address] [facility] [address] [facility], [place]  [phone]   Discharge Summary     Dalton Torres  MRN: [id number]     : 1950     Age: 76 y.o. Admit date: 2018     Discharge date: 2018  Attending Physician: Med aBsilio MD  Primary Discharge Diagnosis:   Principal Problem:    Diverticulitis of large intestine with abscess (2018)    Active Problems:    Parkinsonism ([address] (8/10/2015)      Parkinson's disease ([address] ()      Neurologic orthostatic hypotension ([address] (2016)      Primary Operations or Procedures Performed :  Procedure(s):  OPEN SIGMOID COLECTOMY WITH EEA  ANASTOMOSIS, DRAINAGE OF LOCALIZED ABSCESS, MOBILIZATION OF SPLENIC FLEXURE    Cystoscopy and bilateral ureteral catheter placement. FIRST PROCEDURE     Brief History and Reason for Admission: Dalton Torres was admitted with the following history of present illness. Dalton Torres is a 76 y.o. female who returns following EGD/colonoscopy by Dr. Nolan Thakur in early August and a follow-up CT scan performed on . I reviewed those reports and images from the CT scan. She has continued to have recurrent bouts of abdominal pain throughout this time. She has had multiple visits to the emergency room. She has been placed on antibiotics multiple times. It is apparent that she has chronic recurrent sigmoid diverticulitis. There may also be other things going on with her colon. The CT did show some colitis in the ascending colon. Her complaints are primarily left lower quadrant. She does have some generalized complaints as well. She was placed on Megace which seems to be increasing her appetite. She was present with a caregiver today who says that she will actually eat through the night. She was also placed on mesalamine but she is having difficulty swallowing that pill. She is currently afebrile.   She reports having a bowel movement once every 2 weeks. The caregiver corrected her since said that she is actually having fairly regular bowel movements almost daily this week. She is taking MiraLAX. She is still complaining of hard stools and may need to adjust that dosing.     I had seen her in January 2018 in the office for 4 week follow-up of her complicated diverticulitis. I also saw her in December 2017. A few days after that visit she went to the emergency department for continued complaints of abdominal pain. A follow-up CT scan was performed which shows complete resolution of her diverticulitis and abscess cavity. Her all of her blood work was normal.  She was supposed to have an appointment with Dr. Bianca Nuñez last week but this was canceled due to the snowy conditions. She will set up a repeat appointment with him. She still has many aches and pains. These appear to be chronic. I reviewed her new CT scan and attached the corresponding image from prior image with abscess. This shows resolution and only chronic other findings.     On last visit in December 2017, she comes in not feeling well. She complains of loss of appetite, nausea, abdominal and chest pain. She was seen by Dr. Bianca Nuñez within the past 2 days. I was able to speak with him as well. He ordered blood work which was all without abnormality. He did order a repeat CT scan which will be performed next week. If that scan has remained normalized then he will proceed with colonoscopy. She denies fevers but she does report some chills. She says she has lost additional weight. Her weight today is 111 pounds and was 114 pounds 4 weeks ago. She claims her normal weight is 135 pounds. When I saw her frequent 4 weeks ago she had just started a 30 day course of Cipro and Flagyl.   She claims that she started developing some rash and swelling 2 days ago and stopped the antibiotics at that time but should have nearly completed that course.     She was referred by Dr. Bianca [facility] of [facility] for evaluation of diverticulitis with peridiverticular abscess. Patient is very pleasant woman who presents with a caregiver who she uses due to Parkinson's disease. She moved from [place] in 2015. She was diagnosed with acute diverticulitis with peridiverticular abscess on September 15, 2017. This was at [facility] and she was managed with antibiotics. The originally planned to place a percutaneous drain but that was not required. I do not have access to that CT scan. She had follow-up CT scans one month later on October 16 and then again more recently on October 27. Both of those studies showed continued improvement of the size of the peridiverticular abscess which originally was close to 3 cm then decreased to 1.6 cm and now on the recent study is 1.1 cm and may actually represent a large diverticulum or a resolving abscess. There is very little evidence of residual inflammation. She continues to have some abdominal symptoms but she is not having any fevers or severe abdominal pain. She has chronic constipation and does take a dose of MiraLAX per day. She still has very hard balls for stool. She has not seen any ribbon stools because of these ball-like stools. She had been on Augmentin prior to recent visit with Dr. Myriam Hull. He saw her 2 days ago on November 15 and change her antibiotics from Augmentin to Cipro and Flagyl. He placed her on a 30 day regimen. In further questioning she is taking medications with her MiraLAX. I discussed with her that this may actually affect the absorption of any of her medications as the water bound to MiraLAX is a reversible and must exit the body via the anus. This will frequently capture medications as well and that practice of taking any medications within one half hour of MiraLAX is discouraged. She has one prior episode in her history of diverticulitis. This was in 2015 weeks she was still living in [place].   Similarly she had a peridiverticular abscess that did not require drainage and was treated with antibiotics. Her last colonoscopy was in 2014 or 2015 prior to her episode of diverticulitis. She was noted to have diverticulosis at that time according to her. She has had upper endoscopies type Dr. Paul Harrington recently. These were unremarkable. I have believe he is planning on doing a colonoscopy once there is resolution of her diverticulitis. My usual recommendation is no sooner than 6 weeks to 3 months following resolution. This is done sooner if there is a suspicion of malignancy. Her abdominal surgical history is fairly insignificant. She has had a laparoscopic cholecystectomy as well as bilateral tubal ligation. She has not had any colon procedures.     She has noted allergies to oral and IV iodinated contrast as well as metronidazole. All of these have been used recently or currently and seem to be without issue. Hospital Course: The patient underwent surgery on 9/5/18. Bowel function returned on POD 2 and she was started on a clear liquid diet. Her diet was advanced slowly and she was able to tolerate a soft diet at discharge. OR cultures grew e.coli and she was treated with Rocephin inpatient and sent to rehab with DEMARIO VILLEGAS for 7 days. Pathology was reviewed while inpatient. Pathology      DIAGNOSIS   SIGMOID COLON: DIVERTICULOSIS WITH DIVERTICULITIS.   sms/9/6/2018   Electronically signed out on 9/6/2018 13:09 by Arely Moreno. Deidra Romeo MD     Condition at Discharge: Good    Discharge Medications:   Current Discharge Medication List      START taking these medications    Details   oxyCODONE-acetaminophen (PERCOCET) 5-325 mg per tablet Take 1 Tab by mouth every four (4) hours as needed for up to 5 days. Max Daily Amount: 6 Tabs. Qty: 30 Tab, Refills: 0    Associated Diagnoses: S/P colectomy      cefdinir (OMNICEF) 300 mg capsule Take 1 Cap by mouth two (2) times a day for 7 days.   Qty: 14 Cap, Refills: 0 CONTINUE these medications which have CHANGED    Details   !! ALPRAZolam (XANAX) 0.5 mg tablet Take 1 Tab by mouth three (3) times daily as needed for Anxiety. Max Daily Amount: 1.5 mg.  Qty: 14 Tab, Refills: 0    Associated Diagnoses: Anxiety and depression       !! - Potential duplicate medications found. Please discuss with provider. CONTINUE these medications which have NOT CHANGED    Details   pregabalin (LYRICA) 50 mg capsule Take 50 mg by mouth two (2) times a day. Indications: Diabetic Peripheral Neuropathy      DELZICOL 400 mg cdti capsule Take 400 mg by mouth three (3) times daily. Refills: 0      famotidine (PEPCID) 40 mg tablet Take 40 mg by mouth nightly. neomycin (MYCIFRADIN) 500 mg tablet Take 1 Tab by mouth three (3) times daily. Take at 1 PM, 2 PM and 8 PM day prior to surgery  Qty: 3 Tab, Refills: 0      midodrine (PROAMITINE) 2.5 mg tablet Take  by mouth daily. Indications: Symptomatic Orthostatic Hypotension      !! ALPRAZolam (XANAX) 0.5 mg tablet Take 0.5 mg by mouth as needed. loratadine (CLARITIN) 10 mg tablet Take 10 mg by mouth.      ergocalciferol (VITAMIN D2) 50,000 unit capsule Take 50,000 Units by mouth. docusate sodium (COLACE) 50 mg capsule Take 50 mg by mouth two (2) times a day. MEGESTROL ACETATE (MEGACE PO) Take  by mouth. methIMAzole (TAPAZOLE) 10 mg tablet Take 10 mg by mouth daily. Refills: 0      ondansetron hcl (ZOFRAN) 4 mg tablet Take 1 Tab by mouth three (3) times daily as needed for Nausea. Qty: 9 Tab, Refills: 0      carbidopa-levodopa (PARCOPA)  mg rapid dissolve tablet 2 tabs q3h for 5 doses and 1 tab qhs  Qty: 330 Tab, Refills: 2      polyethylene glycol (MIRALAX) 17 gram packet Take 17 g by mouth as needed. Indications: Constipation      esomeprazole (NEXIUM) 40 mg capsule Take 40 mg by mouth two (2) times a day. !! - Potential duplicate medications found. Please discuss with provider.       STOP taking these medications clindamycin (CLEOCIN) 300 mg capsule Comments:   Reason for Stopping:                 Disposition: [facility]    Discharge Instructions/Follow-up Care:      MD Instructions:     Follow-up with Dr. Cesia Vee in 2 weeks. Keep incisions clean and dry, may remain uncovered. Do not apply lotions, creams or ointments to incisions.     Diet - soft foods  Activity - ambulate - as tolerated - no heavy lifting >10lb. May shower - no tub baths or soaking/submerging.     No driving while taking narcotics. Do not drink alcohol while taking narcotics.   Resume other home medications.      If problems or questions arise, please call our office at [phone].     Greater than 30 minutes were spent discharging the patient    Signed:  Kaushal Riley NP   9/11/2018  10:17 AM

## 2018-09-11 NOTE — PROGRESS NOTES
END OF SHIFT NOTE:    INTAKE/OUTPUT  09/10 0701 - 09/11 0700  In: 386 [I.V.:386]  Out: 1100 [Urine:1100]  Voiding: YES  Catheter: NO  Drain:              Flatus: Patient does have flatus present. Stool:  0 occurrences. Emesis: 0 occurrences. Characteristics:        VITAL SIGNS  Patient Vitals for the past 12 hrs:   Temp Pulse Resp BP SpO2   09/10/18 2300 98.8 °F (37.1 °C) 86 17 102/67 97 %   09/10/18 1800 98.6 °F (37 °C) 74 17 93/61 98 %       Pain Assessment  Pain Intensity 1: 7 (09/11/18 0030)  Pain Location 1: Abdomen  Pain Intervention(s) 1: Medication (see MAR)  Patient Stated Pain Goal: 0    Ambulating  Yes    Shift report given to oncoming nurse at the bedside.     Isha Singh RN

## 2018-09-11 NOTE — DISCHARGE INSTRUCTIONS
Discharge Instructions/Follow-up Plans:   MD Instructions:     Follow-up with Dr. Deysi Hanna in 2 weeks. Keep incisions clean and dry, may remain uncovered. Do not apply lotions, creams or ointments to incisions.     Diet - soft foods  Activity - ambulate - as tolerated - no heavy lifting >10lb. May shower - no tub baths or soaking/submerging.     No driving while taking narcotics. Do not drink alcohol while taking narcotics.   Resume other home medications.      If problems or questions arise, please call our office at [phone].     Greater than 30 minutes were spent discharging the patient

## 2018-09-12 ENCOUNTER — PATIENT OUTREACH (OUTPATIENT)
Dept: CASE MANAGEMENT | Age: 68
End: 2018-09-12

## 2018-09-12 NOTE — PROGRESS NOTES
Please note this patient was IP d/c to [facility] a Preferred SNF and will be followed by Smita Beal RN and included in weekly Care Coordination calls until d/c. Muna Porter LPN/ Care Coordinator [facility] Center[facility][phone] [facility] [place] [address] [place] [address] 
[email or web address] This note will not be viewable in [address]

## 2018-09-21 LAB
BACTERIA SPEC CULT: ABNORMAL
Lab: NORMAL
REFERENCE LAB,REFLB: NORMAL
SERVICE CMNT-IMP: ABNORMAL
TEST DESCRIPTION:,ATST: NORMAL

## 2018-09-24 ENCOUNTER — APPOINTMENT (OUTPATIENT)
Dept: CT IMAGING | Age: 68
DRG: 699 | End: 2018-09-24
Attending: EMERGENCY MEDICINE
Payer: MEDICARE

## 2018-09-24 ENCOUNTER — APPOINTMENT (OUTPATIENT)
Dept: GENERAL RADIOLOGY | Age: 68
DRG: 699 | End: 2018-09-24
Attending: EMERGENCY MEDICINE
Payer: MEDICARE

## 2018-09-24 ENCOUNTER — HOSPITAL ENCOUNTER (INPATIENT)
Age: 68
LOS: 3 days | Discharge: NURSING, REHAB OR LONG-TERM CARE | DRG: 699 | End: 2018-09-27
Attending: EMERGENCY MEDICINE | Admitting: SURGERY
Payer: MEDICARE

## 2018-09-24 DIAGNOSIS — K57.92 DIVERTICULITIS: Primary | ICD-10-CM

## 2018-09-24 DIAGNOSIS — K57.20 DIVERTICULITIS OF LARGE INTESTINE WITH ABSCESS WITHOUT BLEEDING: ICD-10-CM

## 2018-09-24 PROBLEM — D72.829 LEUKOCYTOSIS: Status: ACTIVE | Noted: 2018-09-24

## 2018-09-24 PROBLEM — T81.40XA POST-OPERATIVE INFECTION: Status: ACTIVE | Noted: 2018-09-24

## 2018-09-24 PROBLEM — R10.9 ABDOMINAL PAIN: Status: ACTIVE | Noted: 2018-09-24

## 2018-09-24 LAB
ALBUMIN SERPL-MCNC: 2.9 G/DL (ref 3.2–4.6)
ALBUMIN/GLOB SERPL: 0.8 {RATIO} (ref 1.2–3.5)
ALP SERPL-CCNC: 134 U/L (ref 50–136)
ALT SERPL-CCNC: 24 U/L (ref 12–65)
ANION GAP SERPL CALC-SCNC: 6 MMOL/L (ref 7–16)
AST SERPL-CCNC: 38 U/L (ref 15–37)
BACTERIA URNS QL MICRO: NORMAL /HPF
BASOPHILS # BLD: 0.1 K/UL (ref 0–0.2)
BASOPHILS NFR BLD: 0 % (ref 0–2)
BILIRUB SERPL-MCNC: 0.8 MG/DL (ref 0.2–1.1)
BUN SERPL-MCNC: 11 MG/DL (ref 8–23)
CALCIUM SERPL-MCNC: 8.5 MG/DL (ref 8.3–10.4)
CASTS URNS QL MICRO: NORMAL /LPF
CHLORIDE SERPL-SCNC: 109 MMOL/L (ref 98–107)
CO2 SERPL-SCNC: 26 MMOL/L (ref 21–32)
CREAT SERPL-MCNC: 0.96 MG/DL (ref 0.6–1)
DIFFERENTIAL METHOD BLD: ABNORMAL
EOSINOPHIL # BLD: 0.1 K/UL (ref 0–0.8)
EOSINOPHIL NFR BLD: 0 % (ref 0.5–7.8)
EPI CELLS #/AREA URNS HPF: NORMAL /HPF
ERYTHROCYTE [DISTWIDTH] IN BLOOD BY AUTOMATED COUNT: 16.6 %
GLOBULIN SER CALC-MCNC: 3.7 G/DL (ref 2.3–3.5)
GLUCOSE SERPL-MCNC: 77 MG/DL (ref 65–100)
HCT VFR BLD AUTO: 32.9 % (ref 35.8–46.3)
HGB BLD-MCNC: 10.4 G/DL (ref 11.7–15.4)
IMM GRANULOCYTES # BLD: 0.2 K/UL (ref 0–0.5)
IMM GRANULOCYTES NFR BLD AUTO: 1 % (ref 0–5)
LIPASE SERPL-CCNC: 114 U/L (ref 73–393)
LYMPHOCYTES # BLD: 0.6 K/UL (ref 0.5–4.6)
LYMPHOCYTES NFR BLD: 3 % (ref 13–44)
MCH RBC QN AUTO: 26.3 PG (ref 26.1–32.9)
MCHC RBC AUTO-ENTMCNC: 31.6 G/DL (ref 31.4–35)
MCV RBC AUTO: 83.1 FL (ref 79.6–97.8)
MONOCYTES # BLD: 0.9 K/UL (ref 0.1–1.3)
MONOCYTES NFR BLD: 4 % (ref 4–12)
NEUTS SEG # BLD: 21.3 K/UL (ref 1.7–8.2)
NEUTS SEG NFR BLD: 92 % (ref 43–78)
NRBC # BLD: 0 K/UL (ref 0–0.2)
PLATELET # BLD AUTO: 293 K/UL (ref 150–450)
PMV BLD AUTO: 10.3 FL (ref 9.4–12.3)
POTASSIUM SERPL-SCNC: 4.5 MMOL/L (ref 3.5–5.1)
PROT SERPL-MCNC: 6.6 G/DL (ref 6.3–8.2)
RBC # BLD AUTO: 3.96 M/UL (ref 4.05–5.2)
RBC #/AREA URNS HPF: NORMAL /HPF
SODIUM SERPL-SCNC: 141 MMOL/L (ref 136–145)
WBC # BLD AUTO: 23.1 K/UL (ref 4.3–11.1)
WBC URNS QL MICRO: NORMAL /HPF

## 2018-09-24 PROCEDURE — 99285 EMERGENCY DEPT VISIT HI MDM: CPT | Performed by: EMERGENCY MEDICINE

## 2018-09-24 PROCEDURE — 81003 URINALYSIS AUTO W/O SCOPE: CPT | Performed by: EMERGENCY MEDICINE

## 2018-09-24 PROCEDURE — 74011250636 HC RX REV CODE- 250/636: Performed by: NURSE PRACTITIONER

## 2018-09-24 PROCEDURE — 74019 RADEX ABDOMEN 2 VIEWS: CPT

## 2018-09-24 PROCEDURE — 96374 THER/PROPH/DIAG INJ IV PUSH: CPT | Performed by: EMERGENCY MEDICINE

## 2018-09-24 PROCEDURE — 65270000029 HC RM PRIVATE

## 2018-09-24 PROCEDURE — 74176 CT ABD & PELVIS W/O CONTRAST: CPT

## 2018-09-24 PROCEDURE — 96375 TX/PRO/DX INJ NEW DRUG ADDON: CPT | Performed by: EMERGENCY MEDICINE

## 2018-09-24 PROCEDURE — 74011636320 HC RX REV CODE- 636/320: Performed by: EMERGENCY MEDICINE

## 2018-09-24 PROCEDURE — 80053 COMPREHEN METABOLIC PANEL: CPT

## 2018-09-24 PROCEDURE — 81015 MICROSCOPIC EXAM OF URINE: CPT

## 2018-09-24 PROCEDURE — 85025 COMPLETE CBC W/AUTO DIFF WBC: CPT

## 2018-09-24 PROCEDURE — 83690 ASSAY OF LIPASE: CPT

## 2018-09-24 PROCEDURE — 74011000258 HC RX REV CODE- 258: Performed by: NURSE PRACTITIONER

## 2018-09-24 PROCEDURE — 96361 HYDRATE IV INFUSION ADD-ON: CPT | Performed by: EMERGENCY MEDICINE

## 2018-09-24 PROCEDURE — 77030027138 HC INCENT SPIROMETER -A

## 2018-09-24 PROCEDURE — 74011250636 HC RX REV CODE- 250/636: Performed by: EMERGENCY MEDICINE

## 2018-09-24 PROCEDURE — 74011000250 HC RX REV CODE- 250: Performed by: NURSE PRACTITIONER

## 2018-09-24 RX ORDER — SODIUM CHLORIDE 0.9 % (FLUSH) 0.9 %
5-10 SYRINGE (ML) INJECTION EVERY 8 HOURS
Status: DISCONTINUED | OUTPATIENT
Start: 2018-09-24 | End: 2018-09-27 | Stop reason: HOSPADM

## 2018-09-24 RX ORDER — DEXTROSE MONOHYDRATE AND SODIUM CHLORIDE 5; .45 G/100ML; G/100ML
100 INJECTION, SOLUTION INTRAVENOUS CONTINUOUS
Status: DISCONTINUED | OUTPATIENT
Start: 2018-09-24 | End: 2018-09-27

## 2018-09-24 RX ORDER — ALPRAZOLAM 0.5 MG/1
0.5 TABLET ORAL
Status: DISCONTINUED | OUTPATIENT
Start: 2018-09-24 | End: 2018-09-27 | Stop reason: HOSPADM

## 2018-09-24 RX ORDER — HYDROMORPHONE HYDROCHLORIDE 1 MG/ML
0.5 INJECTION, SOLUTION INTRAMUSCULAR; INTRAVENOUS; SUBCUTANEOUS
Status: COMPLETED | OUTPATIENT
Start: 2018-09-24 | End: 2018-09-24

## 2018-09-24 RX ORDER — CARBIDOPA AND LEVODOPA 25; 100 MG/1; MG/1
1 TABLET ORAL
Status: DISCONTINUED | OUTPATIENT
Start: 2018-09-24 | End: 2018-09-25

## 2018-09-24 RX ORDER — MIDODRINE HYDROCHLORIDE 5 MG/1
2.5 TABLET ORAL 3 TIMES DAILY
Status: DISCONTINUED | OUTPATIENT
Start: 2018-09-24 | End: 2018-09-27 | Stop reason: HOSPADM

## 2018-09-24 RX ORDER — METHIMAZOLE 5 MG/1
5 TABLET ORAL DAILY
Status: DISCONTINUED | OUTPATIENT
Start: 2018-09-25 | End: 2018-09-27 | Stop reason: HOSPADM

## 2018-09-24 RX ORDER — ONDANSETRON 2 MG/ML
4 INJECTION INTRAMUSCULAR; INTRAVENOUS
Status: DISCONTINUED | OUTPATIENT
Start: 2018-09-24 | End: 2018-09-27 | Stop reason: HOSPADM

## 2018-09-24 RX ORDER — HYDROMORPHONE HYDROCHLORIDE 1 MG/ML
0.5 INJECTION, SOLUTION INTRAMUSCULAR; INTRAVENOUS; SUBCUTANEOUS
Status: DISCONTINUED | OUTPATIENT
Start: 2018-09-24 | End: 2018-09-27 | Stop reason: HOSPADM

## 2018-09-24 RX ORDER — NALOXONE HYDROCHLORIDE 0.4 MG/ML
0.4 INJECTION, SOLUTION INTRAMUSCULAR; INTRAVENOUS; SUBCUTANEOUS AS NEEDED
Status: DISCONTINUED | OUTPATIENT
Start: 2018-09-24 | End: 2018-09-27 | Stop reason: HOSPADM

## 2018-09-24 RX ORDER — ONDANSETRON 2 MG/ML
4 INJECTION INTRAMUSCULAR; INTRAVENOUS
Status: COMPLETED | OUTPATIENT
Start: 2018-09-24 | End: 2018-09-24

## 2018-09-24 RX ORDER — PREGABALIN 50 MG/1
50 CAPSULE ORAL 2 TIMES DAILY
Status: DISCONTINUED | OUTPATIENT
Start: 2018-09-24 | End: 2018-09-27 | Stop reason: HOSPADM

## 2018-09-24 RX ORDER — SODIUM CHLORIDE 0.9 % (FLUSH) 0.9 %
5-10 SYRINGE (ML) INJECTION AS NEEDED
Status: DISCONTINUED | OUTPATIENT
Start: 2018-09-24 | End: 2018-09-27 | Stop reason: HOSPADM

## 2018-09-24 RX ADMIN — HYDROMORPHONE HYDROCHLORIDE 0.5 MG: 1 INJECTION, SOLUTION INTRAMUSCULAR; INTRAVENOUS; SUBCUTANEOUS at 11:38

## 2018-09-24 RX ADMIN — ONDANSETRON 4 MG: 2 INJECTION INTRAMUSCULAR; INTRAVENOUS at 11:38

## 2018-09-24 RX ADMIN — DEXTROSE MONOHYDRATE AND SODIUM CHLORIDE 100 ML/HR: 5; .45 INJECTION, SOLUTION INTRAVENOUS at 19:22

## 2018-09-24 RX ADMIN — SODIUM CHLORIDE 1000 ML: 900 INJECTION, SOLUTION INTRAVENOUS at 11:45

## 2018-09-24 RX ADMIN — FAMOTIDINE 20 MG: 10 INJECTION, SOLUTION INTRAVENOUS at 22:01

## 2018-09-24 RX ADMIN — CEFTRIAXONE SODIUM 2 G: 2 INJECTION, POWDER, FOR SOLUTION INTRAMUSCULAR; INTRAVENOUS at 17:31

## 2018-09-24 RX ADMIN — DIATRIZOATE MEGLUMINE AND DIATRIZOATE SODIUM 15 ML: 660; 100 LIQUID ORAL; RECTAL at 14:00

## 2018-09-24 NOTE — ED NOTES
Lab called states they will try and send someone for recollect green top, multiple attempts by nursing staff.

## 2018-09-24 NOTE — ED NOTES
Pt denies ability to urinate at this time, pt informed that if she is unable to get a sample we will have to use cath. Pt states that she has not been eating and drinking well because the rehab does not feed her.

## 2018-09-24 NOTE — ED TRIAGE NOTES
Per EMS, called to [facility]. Pt had colectomy recently and is having abd pain. VSS in route. No interventions used. Pt in triage room via w/c. Pt reports having colectomy about a week ago related to complications from diverticulitis. Pt afebrile at this time. Pt has midline abd insicion repaired with stables. No swelling, redness, or drainage from the inside. Dr. Debbie Patterson examined pt in triage.

## 2018-09-24 NOTE — PROGRESS NOTES
Patient with recent admit and discharge from [facility] (9-5-18 to 9-11-18) with transfer to [facility]. Patient coming from [facility] today.

## 2018-09-24 NOTE — H&P
[facility] 
[facility]. [address] [facility] [address], [address] [facility], [address] [facility] 
[phone] H&P Note  
Winnie Hernandez   MRN: [id number]     : 1950 HPI: Winnie Hernandez is a 76 y.o. female who is s/p open sigmoid colectomy with EEA anastomosis and drainage of localized abscess 18. Her hospitalization was uneventful. Bowel function returned on POD 2 and she was started on a clear liquid diet. Her diet was advanced slowly and she was able to tolerate a soft diet at discharge. OR cultures grew e.coli and she was treated with Rocephin inpatient and sent to rehab with DEMARIO VILLEGAS for 7 days. She reported to the ER today with complaints of abdominal pain \"all over\". ER work up was impressive for a WBC of 28,100. She is AF with VSS. A CT scan was obtained that is concerning for diverticulitis. Her abdomen is benign. She denies fever or chills. She has been tolerating a soft diet. She is moving her bowels. Past surgical history: She was seen in our office following EGD/colonoscopy by Dr. Milly Keenan in early August and a follow-up CT scan performed on . I reviewed those reports and images from the CT scan. She has continued to have recurrent bouts of abdominal pain throughout this time. She has had multiple visits to the emergency room. She has been placed on antibiotics multiple times. It is apparent that she has chronic recurrent sigmoid diverticulitis. There may also be other things going on with her colon. The CT did show some colitis in the ascending colon. Her complaints are primarily left lower quadrant. She does have some generalized complaints as well. She was placed on Megace which seems to be increasing her appetite. She was present with a caregiver today who says that she will actually eat through the night. She was also placed on mesalamine but she is having difficulty swallowing that pill. She is currently afebrile.   She reports having a bowel movement once every 2 weeks. The caregiver corrected her since said that she is actually having fairly regular bowel movements almost daily this week. She is taking MiraLAX. She is still complaining of hard stools and may need to adjust that dosing. I had seen her in January 2018 in the office for 4 week follow-up of her complicated diverticulitis. I also saw her in December 2017. A few days after that visit she went to the emergency department for continued complaints of abdominal pain. A follow-up CT scan was performed which shows complete resolution of her diverticulitis and abscess cavity. Her all of her blood work was normal.  She was supposed to have an appointment with Dr. Smita Greer last week but this was canceled due to the snowy conditions. She will set up a repeat appointment with him. She still has many aches and pains. These appear to be chronic. I reviewed her new CT scan and attached the corresponding image from prior image with abscess. This shows resolution and only chronic other findings. On last visit in December 2017, she comes in not feeling well. She complains of loss of appetite, nausea, abdominal and chest pain. She was seen by Dr. Smita Greer within the past 2 days. I was able to speak with him as well. He ordered blood work which was all without abnormality. He did order a repeat CT scan which will be performed next week. If that scan has remained normalized then he will proceed with colonoscopy. She denies fevers but she does report some chills. She says she has lost additional weight. Her weight today is 111 pounds and was 114 pounds 4 weeks ago. She claims her normal weight is 135 pounds. When I saw her frequent 4 weeks ago she had just started a 30 day course of Cipro and Flagyl. She claims that she started developing some rash and swelling 2 days ago and stopped the antibiotics at that time but should have nearly completed that course. She was referred by [facility] for evaluation of diverticulitis with peridiverticular abscess. Patient is very pleasant woman who presents with a caregiver who she uses due to Parkinson's disease. She moved from [place] in 2015. She was diagnosed with acute diverticulitis with peridiverticular abscess on September 15, 2017. This was at [address] and she was managed with antibiotics. The originally planned to place a percutaneous drain but that was not required. I do not have access to that CT scan. She had follow-up CT scans one month later on October 16 and then again more recently on October 27. Both of those studies showed continued improvement of the size of the peridiverticular abscess which originally was close to 3 cm then decreased to 1.6 cm and now on the recent study is 1.1 cm and may actually represent a large diverticulum or a resolving abscess. There is very little evidence of residual inflammation. She continues to have some abdominal symptoms but she is not having any fevers or severe abdominal pain. She has chronic constipation and does take a dose of MiraLAX per day. She still has very hard balls for stool. She has not seen any ribbon stools because of these ball-like stools. She had been on Augmentin prior to recent visit with Dr. Colton Hallman. He saw her 2 days ago on November 15 and change her antibiotics from Augmentin to Cipro and Flagyl. He placed her on a 30 day regimen. In further questioning she is taking medications with her MiraLAX. I discussed with her that this may actually affect the absorption of any of her medications as the water bound to MiraLAX is a reversible and must exit the body via the anus. This will frequently capture medications as well and that practice of taking any medications within one half hour of MiraLAX is discouraged. She has one prior episode in her history of diverticulitis.   This was in 2015 weeks she was still living in [place]. Similarly she had a peridiverticular abscess that did not require drainage and was treated with antibiotics. Her last colonoscopy was in 2014 or 2015 prior to her episode of diverticulitis. She was noted to have diverticulosis at that time according to her. She has had upper endoscopies type Dr. Luigi Choi recently. These were unremarkable. I have believe he is planning on doing a colonoscopy once there is resolution of her diverticulitis. My usual recommendation is no sooner than 6 weeks to 3 months following resolution. This is done sooner if there is a suspicion of malignancy. Her abdominal surgical history is fairly insignificant. She has had a laparoscopic cholecystectomy as well as bilateral tubal ligation. She has not had any colon procedures. She has noted allergies to oral and IV iodinated contrast as well as metronidazole. All of these have been used recently or currently and seem to be without issue. Pathology was reviewed while inpatient.  
  
Pathology  
  
 DIAGNOSIS  
SIGMOID COLON: DIVERTICULOSIS WITH DIVERTICULITIS. sms/9/6/2018 Electronically signed out on 9/6/2018 13:09 by Denise Sahu. Maura Gray MD   
 
Past Medical History:  
Diagnosis Date  Abnormal gait 7/5/2016  Acute serous otitis media of left ear  Anxiety  Bilateral carpal tunnel syndrome 7/5/2016  Chronic abdominal pain 7/5/2016  Dementia due to Parkinson's disease without behavioral disturbance ([address] 7/5/2016  Depression   
 managed by meds  Diverticulitis  Gastritis  GERD (gastroesophageal reflux disease)  Heart murmur   
 followed by [facility] cardiology. last echo10/2015  Hypercholesterolemia  Hypertension   
 daily meds, on norvasc prn- took one yesterday for BP of 152/68  Neuropathic pain 7/5/2016  Nontoxic multinodular goiter 7/5/2016  Orthostatic hypotension   
 managed by meds  Parkinson's disease ([address] dx 2014- sometimes requires walker or wheelchair when tired  Peripheral neuropathy  Syncope 10/10/2015  Type 2 diabetes mellitus without complication 6/3/4217 Alistair Jung thought I was a diabetic\" - no meds- started checking glucose 2 yrs ago; last A1C=? ; has not checked since 5 mos ago (June 2016)  Urinary frequency 7/5/2016  Vaginal bleeding  Weight loss, unintentional   
 50 lbs over 2 yrs Past Surgical History:  
Procedure Laterality Date  COLONOSCOPY N/A 8/6/2018 COLONOSCOPY/ 19 performed by Dawna Louis MD at [address]  HX CHOLECYSTECTOMY  [address] [facility] [address] Current Facility-Administered Medications Medication Dose Route Frequency  sodium chloride (NS) flush 5-10 mL  5-10 mL IntraVENous Q8H  
 sodium chloride (NS) flush 5-10 mL  5-10 mL IntraVENous PRN  
 HYDROmorphone (PF) (DILAUDID) injection 0.5 mg  0.5 mg IntraVENous Q4H PRN  
 naloxone (NARCAN) injection 0.4 mg  0.4 mg IntraVENous PRN  
 ondansetron (ZOFRAN) injection 4 mg  4 mg IntraVENous Q4H PRN  
 cefTRIAXone (ROCEPHIN) 2 g in 0.9% sodium chloride (MBP/ADV) 50 mL  2 g IntraVENous Q24H  ALPRAZolam (XANAX) tablet 0.5 mg  0.5 mg Oral TID PRN  
 . PHARMACY TO SUBSTITUTE PER PROTOCOL (Reordered from: carbidopa-levodopa (PARCOPA)  mg rapid dissolve tablet)    Per Protocol  [START ON 9/25/2018] methIMAzole (TAPAZOLE) tablet 10 mg  10 mg Oral DAILY  [START ON 9/25/2018] midodrine (PROAMITINE) tablet 2.5 mg  2.5 mg Oral DAILY  pregabalin (LYRICA) capsule 50 mg  50 mg Oral BID  famotidine (PF) (PEPCID) 20 mg in sodium chloride 0.9% 10 mL injection  20 mg IntraVENous Q12H  
 dextrose 5 % - 0.45% NaCl infusion  100 mL/hr IntraVENous CONTINUOUS Current Outpatient Prescriptions Medication Sig  ALPRAZolam (XANAX) 0.5 mg tablet Take 1 Tab by mouth three (3) times daily as needed for Anxiety.  Max Daily Amount: 1.5 mg.  
  pregabalin (LYRICA) 50 mg capsule Take 1 Cap by mouth two (2) times a day. Max Daily Amount: 100 mg. Indications: Diabetic Peripheral Neuropathy  pregabalin (LYRICA) 50 mg capsule Take 50 mg by mouth two (2) times a day. Indications: Diabetic Peripheral Neuropathy  DELZICOL 400 mg cdti capsule Take 400 mg by mouth three (3) times daily.  famotidine (PEPCID) 40 mg tablet Take 40 mg by mouth nightly.  neomycin (MYCIFRADIN) 500 mg tablet Take 1 Tab by mouth three (3) times daily. Take at 1 PM, 2 PM and 8 PM day prior to surgery  midodrine (PROAMITINE) 2.5 mg tablet Take  by mouth daily. Indications: Symptomatic Orthostatic Hypotension  ALPRAZolam (XANAX) 0.5 mg tablet Take 0.5 mg by mouth as needed.  loratadine (CLARITIN) 10 mg tablet Take 10 mg by mouth.  ergocalciferol (VITAMIN D2) 50,000 unit capsule Take 50,000 Units by mouth.  docusate sodium (COLACE) 50 mg capsule Take 50 mg by mouth two (2) times a day.  MEGESTROL ACETATE (MEGACE PO) Take  by mouth.  methIMAzole (TAPAZOLE) 10 mg tablet Take 10 mg by mouth daily.  ondansetron hcl (ZOFRAN) 4 mg tablet Take 1 Tab by mouth three (3) times daily as needed for Nausea.  carbidopa-levodopa (PARCOPA)  mg rapid dissolve tablet 2 tabs q3h for 5 doses and 1 tab qhs  polyethylene glycol (MIRALAX) 17 gram packet Take 17 g by mouth as needed. Indications: Constipation  esomeprazole (NEXIUM) 40 mg capsule Take 40 mg by mouth two (2) times a day. ALLERGIES:  Flagyl [metronidazole]; Aspirin; Contrast agent [iodine]; Gabapentin; Gluten; Milk containing products; Morphine; Omnipaque rediflo [iohexol]; and Red dye Social History Social History  Marital status:  Spouse name: N/A  
 Number of children: N/A  
 Years of education: N/A Social History Main Topics  Smoking status: Never Smoker  Smokeless tobacco: Never Used  Alcohol use No  
 Drug use: No  
 Sexual activity: Not Asked Other Topics Concern  None Social History Narrative History Smoking Status  Never Smoker Smokeless Tobacco  
 Never Used Family History Problem Relation Age of Onset  Alzheimer Mother  Diabetes Father  Cancer Father   
  prostate  Stroke Father  Diabetes Sister  Diabetes Brother  Cancer Brother   
  kidney and prostate  Sickle Cell Anemia Son   
  2 sons  Breast Cancer Neg Hx   
 
 
ROS: The patient has no difficulty with chest pain or shortness of breath. No fever or chills. The patient denies any personal or family history of abnormal clotting or bleeding. Comprehensive review of systems was otherwise unremarkable except as noted above. Physical Exam:  
Constitutional: Alert oriented cooperative patient in no acute distress. Visit Vitals  /78 (BP 1 Location: Left arm, BP Patient Position: Sitting) Comment: 120/80 standing  Pulse 76  Resp 19  
 Ht 5' 7\" (1.702 m)  Wt 119 lb 1.6 oz (54 kg)  SpO2 98%  BMI 18.65 kg/m2 Eyes:Sclera are clear without icterus. ENMT: no obvious neck masses, no ear or lip lesions CV: RRR. Normal perfusion Resp: No JVD. Breathing is  non-labored. GI: thin, soft, non-distended, surgical incision c/d/i with staples, no ttp, no rebound, no guarding. Musculoskeletal: unremarkable with normal function. Neuro:  No obvious focal deficits Psychiatric: normal affect and mood, no memory impairment Lab Results Component Value Date/Time WBC 23.1 (H) 09/24/2018 11:36 AM  
 HGB 10.4 (L) 09/24/2018 11:36 AM  
 HCT 32.9 (L) 09/24/2018 11:36 AM  
 PLATELET 826 34/51/9655 11:36 AM  
 MCV 83.1 09/24/2018 11:36 AM  
 
 
Lab Results Component Value Date/Time  Sodium 141 09/24/2018 01:09 PM  
 Potassium 4.5 09/24/2018 01:09 PM  
 Chloride 109 (H) 09/24/2018 01:09 PM  
 CO2 26 09/24/2018 01:09 PM  
 BUN 11 09/24/2018 01:09 PM  
 Creatinine 0.96 09/24/2018 01:09 PM  
 Glucose 77 09/24/2018 01:09 PM  
 Bilirubin, total 0.8 09/24/2018 01:09 PM  
 AST (SGOT) 38 (H) 09/24/2018 01:09 PM  
 Alk. phosphatase 134 09/24/2018 01:09 PM  
 Amylase 76 11/22/2016 11:14 AM  
 Lipase 114 09/24/2018 01:09 PM  
 
Lab Results Component Value Date/Time ALT (SGPT) 24 09/24/2018 01:09 PM  
 
CT OF THE ABDOMEN AND PELVIS WITH CONTRAST: 10/27/2017 CLINICAL INDICATION: Abdominal pain, follow-up diverticular abscess in the left 
lower abdomen. PROCEDURE: Serial thin section axial images obtained from the lung bases through 
the proximal femurs following the administration of 100 cc of Isovue 370 
intravenous contrast.  Radiation dose reduction techniques were used for this 
study. Our CT scanners use one or all of the following: Automated exposure 
control, adjusted of the mA and/or kV according to patient size, iterative 
reconstruction COMPARISON: CT of the abdomen and pelvis dated 10/16/2017 and 9/15/2017 FINDINGS:  
CT ABDOMEN: There is a slight decrease in the size of the extraluminal air in 
the left lower abdominal quadrant that now measures only 11 mm. This is likely 
residual abscess. Note there is no discernible fluid within the collection. Patient is status post cholecystectomy with mild intra and extra hepatic bile 
duct dilation. No obvious stones. The pancreas, liver, spleen, and bilateral 
kidneys are unremarkable. The adrenal glands are normal. No additional or new 
bowel inflammation. CT PELVIS: There is extensive sigmoid colon diverticulosis. The bladder is 
well-distended with smooth thin walls. A uterine fibroid is present. No free 
fluid acutely dependently in the pelvis. No aggressive osseous lesions identified. 
  
IMPRESSION: 
1. Minimal residual inflammation in the left lower abdominal quadrant with a 
small presumably extraluminal collection of air that now measures 11 mm in 
keeping with resolving abscess. Note this could also be a prominent diverticula. 2. Uterine fibroid 3. Sigmoid colon diverticulosis. 4. Mild intra and extrahepatic bile duct dilation. Image from 10/16/2017 CT ABDOMEN AND PELVIS WITH CONTRAST: 12/18/2017 HISTORY: abd pain, hx of diverticular dz. wt loss, nausea/vomiting, 67 years Female ?c/o abdominal pain- states she was recently dx with diverticulitis. Pt 
states nausea. Pt also c/o blood in urine and generalized body pain BMI 17.4 COMPARISON: CT abdomen October 27, 2017 TECHNIQUE: Oral contrast was not administered. 100 cc of nonionic intravenous 
contrast was injected, and axial helical CT images were obtained from above the 
diaphragm through the pelvis. Coronal reformatted images were obtained at the 
scanner console and made available for review. Radiation dose reduction 
techniques were used for this study:  Our CT scanners use one or all of the 
following: Automated exposure control, adjustment of the mA and/or kVp according 
to patient's size, iterative reconstruction. FINDINGS: 
ABDOMEN: 
Minimal dependent subsegmental atelectasis bilateral lung bases. Evidence of 
cholecystectomy. Persistent diffuse hepatic steatosis. Normal-appearing 
spleen, pancreas, bilateral adrenal glands. Small simple appearing right renal 
interpolar region cortical cyst unchanged. Subcentimeter left renal cortical 
hypoenhancing lesions also unchanged, too small to characterize. Left renal 
upper pole cortical scarring unchanged. Mild calcific atherosclerosis of a 
normal caliber abdominal aorta. No evidence of significant lymphadenopathy. Normal-appearing small bowel. No evidence of intraperitoneal free air or free 
fluid. PELVIS: 
Normal-appearing urinary bladder. Ovoid mass in the anterior uterine myometrium 
measuring up to 2.5 cm appears unchanged, may represent a fibroid. Normal-appearing bilateral adnexa. Large stool ball in the rectum. Stool is 
seen throughout the colon.   There is no definite evidence of residual 
 diverticular abscess. Persistent sigmoid diverticulosis. No evidence of pelvic 
free fluid. No evidence of significant inguinal or pelvic sidewall 
lymphadenopathy. Visualized osseous structures unremarkable. 
  
IMPRESSION:  
1. No acute pathology identified. No evidence of residual diverticular 
abscess. 2.  Other chronic findings as above. 
  
 
 
 
CT OF THE ABDOMEN AND PELVIS WITH CONTRAST: 8/7/2018 CLINICAL INDICATION: Moderate diffuse abdominal pain for three months, 
diverticulitis PROCEDURE: Serial thin section axial images obtained from the lung bases through 
the proximal femurs following the administration of 100 cc of Isovue 370 
intravenous contrast.  Radiation dose reduction techniques were used for this 
study. Our CT scanners use one or all of the following: Automated exposure 
control, adjusted of the mA and/or kV according to patient size, iterative 
reconstruction COMPARISON: CT of the abdomen and pelvis dated 5/31/20181 FINDINGS:  
The lung bases are clear. CT ABDOMEN: The liver, spleen, pancreas, and kidneys are unremarkable. The 
gallbladder is been resected. The adrenal glands are normal. There is no ascites 
or adenopathy. There is a long segment of circumferential wall thickening 
involving the ascending colon with mild inflammation in the pericolonic fat in 
keeping with a colitis. There is more subtle involvement of the transverse colon 
as well. No bowel obstruction or perforation evident. No free air identified. The small bowel is unremarkable. CT PELVIS: The bladder is normal. The rectum and sigmoid colon show diverticular 
change without acute diverticulitis. The uterus and adnexa are unremarkable. No 
free fluid accumulating dependently in the pelvis. No aggressive osseous lesions identified. 
  
IMPRESSION: 
1. Long segment colitis involving the ascending colon and proximal portion of 
the transverse colon. 2. Sigmoid colon diverticulosis. CTAP 9/24/18 IMPRESSION: 
  
Surgical changes status post partial sigmoidectomy and primary reanastomosis. There is no evidence of a fluid collection at the level of the anastomosis 
within the limits of noncontrast CT. There is no evidence of bowel perforation 
at the anastomosis or anywhere in the abdomen. 
  
There is subtly worsened stranding of the mesenteric fat adjacent to multiple 
diverticula involving the ascending colon. While some of the stranding could be 
related to postoperative fluid in the paracolic gutter, a diverticulitis is also 
possible. Clinical correlation for associated symptoms is recommended. 
  
There are a few mildly dilated small bowel loops in the midabdomen, without 
evidence of a bowel obstruction. There is no pneumatosis, wall thickening, or 
bowel perforation. This is nonspecific and could be related to enteritis or mild 
postoperative ileus. Assessment/Plan:     Maryan Pierre is a 76 y.o. female who has history of complicated diverticulitis with peridiverticular abscess. She underwent surgery 9/5 for a sigmoid colectomy with drainage of abscess. She had been doing well until recently when she developed abdominal pain. Problem List  Date Reviewed: 9/5/2018 Codes Class Noted Leukocytosis ICD-10-CM: R63.734 ICD-9-CM: 288.60  9/24/2018 Post-operative infection ICD-10-CM: T81. 4XXA ICD-9-CM: 998.59  9/24/2018 Diverticulitis of large intestine with abscess ICD-10-CM: K57.20 ICD-9-CM: 562.11, 569.5  9/5/2018 Type 2 diabetes mellitus with nephropathy ([facility] ICD-10-CM: E11.21 
ICD-9-CM: 250.40, 583.81  1/22/2018 Diverticular disease of intestine with perforation and abscess ICD-10-CM: K57.80 ICD-9-CM: 562.11  11/17/2017 Constipation ICD-10-CM: K59.00 ICD-9-CM: 564.00  11/17/2017 Nausea and vomiting ICD-10-CM: R11.2 ICD-9-CM: 787.01  11/7/2016 Stomach ache ICD-10-CM: R10.9 ICD-9-CM: 536.8  11/7/2016 RBD (REM behavioral disorder) ICD-10-CM: G47.52 
ICD-9-CM: 327.42  9/29/2016 Neurologic orthostatic hypotension (HCC) ICD-10-CM: G90.3 ICD-9-CM: 333.0  9/6/2016 Anxiety and depression ICD-10-CM: F41.9, F32.9 ICD-9-CM: 300.00, 311  9/6/2016 Loss of appetite ICD-10-CM: R63.0 ICD-9-CM: 783.0  9/6/2016 Psychosomatic factor in physical condition ICD-10-CM: F54 
ICD-9-CM: 354  9/6/2016 Neuropathy associated with endocrine disorder (HCC) ICD-10-CM: E34.9, G63 ICD-9-CM: 355.9, 259.9  7/21/2016 Parkinson disease [facility]) ICD-10-CM: G20 
ICD-9-CM: 332.0  7/21/2016 Tremor ICD-10-CM: R25.1 ICD-9-CM: 781.0  7/21/2016 Nontoxic multinodular goiter ICD-10-CM: E04.2 ICD-9-CM: 241.1  7/5/2016 Abnormal gait ICD-10-CM: R26.9 ICD-9-CM: 781.2  7/5/2016 Chronic abdominal pain ICD-10-CM: R10.9, G89.29 ICD-9-CM: 789.00, 338.29  7/5/2016 GERD (gastroesophageal reflux disease) ICD-10-CM: K21.9 ICD-9-CM: 530.81  7/5/2016 Anxiety ICD-10-CM: F41.9 ICD-9-CM: 300.00  7/5/2016 Type 2 diabetes mellitus without complication CGS-26-SQ: X18.3 ICD-9-CM: 250.00  7/5/2016 Bilateral carpal tunnel syndrome ICD-10-CM: G56.03 
ICD-9-CM: 354.0  7/5/2016 Urinary frequency ICD-10-CM: R35.0 ICD-9-CM: 788.41  7/5/2016 Neuropathic pain ICD-10-CM: M79.2 ICD-9-CM: 729.2  7/5/2016 Hypertension ICD-10-CM: I10 
ICD-9-CM: 401.9  Unknown Parkinson's disease ([facility] ICD-10-CM: G20 
ICD-9-CM: 332.0  Unknown Peripheral neuropathy ICD-10-CM: G62.9 ICD-9-CM: 356.9  Unknown Syncope ICD-10-CM: R55 
ICD-9-CM: 780.2  10/10/2015 Parkinsonism ([facility] ICD-10-CM: G20 
ICD-9-CM: 332.0  8/10/2015 Admit to 2nd floor IV Rocephin q24 IVF Pepcid/SCD Serial exams CBC, BMP, Mag, Phos in AM 
PTA meds restarted NPO except meds Further order pending evaluation by Dr. Salomón Rodriguez, NP

## 2018-09-24 NOTE — ED PROVIDER NOTES
HPI Comments: 55-year-old female had colectomy by Dr. Latricia Mclean last week Complaints of lower abdominal pain associated with nausea no fever no vomiting Kiesha Velazquez MD; 9/24/2018 @10:21 AM=========================================== 
 
 
ppatient is a 55-year-old female with history of colectomy on 9/5 with Dr. Latricia Mclean. [facility] and presents today with increased pain and reports of vomiting. She reports her symptoms began 3 days ago. No diarrhea. She reports \"hard stool\". No recent fevers. No swelling or discharge from surgical site. Pt has appointment with Latricia Mclean tomorrow. Patient is a 76 y.o. female presenting with abdominal pain. The history is provided by the patient. No  was used. Abdominal Pain Associated symptoms include nausea and vomiting. Pertinent negatives include no fever, no dysuria, no headaches and no back pain. Past Medical History:  
Diagnosis Date  Abnormal gait 7/5/2016  Acute serous otitis media of left ear  Anxiety  Bilateral carpal tunnel syndrome 7/5/2016  Chronic abdominal pain 7/5/2016  Dementia due to Parkinson's disease without behavioral disturbance ([address] 7/5/2016  Depression   
 managed by meds  Diverticulitis  Gastritis  GERD (gastroesophageal reflux disease)  Heart murmur   
 followed by [facility] cardiology. last echo10/2015  Hypercholesterolemia  Hypertension   
 daily meds, on norvasc prn- took one yesterday for BP of 152/68  Neuropathic pain 7/5/2016  Nontoxic multinodular goiter 7/5/2016  Orthostatic hypotension   
 managed by meds  Parkinson's disease ([address]   
 dx 2014- sometimes requires walker or wheelchair when tired  Peripheral neuropathy  Syncope 10/10/2015  Type 2 diabetes mellitus without complication 7/6/8900  [place] thought I was a diabetic\" - no meds- started checking glucose 2 yrs ago; last A1C=? ; has not checked since 5 mos ago (June 2016)  Urinary frequency 7/5/2016  Vaginal bleeding  Weight loss, unintentional   
 50 lbs over 2 yrs Past Surgical History:  
Procedure Laterality Date  COLONOSCOPY N/A 8/6/2018 COLONOSCOPY/ 19 performed by Priscilla Coy MD at [address]  HX CHOLECYSTECTOMY  [address] Family History:  
Problem Relation Age of Onset  Alzheimer Mother  Diabetes Father  Cancer Father   
  prostate  Stroke Father  Diabetes Sister  Diabetes Brother  Cancer Brother   
  kidney and prostate  Sickle Cell Anemia Son   
  2 sons  Breast Cancer Neg Hx Social History Social History  Marital status:  Spouse name: N/A  
 Number of children: N/A  
 Years of education: N/A Occupational History  Not on file. Social History Main Topics  Smoking status: Never Smoker  Smokeless tobacco: Never Used  Alcohol use No  
 Drug use: No  
 Sexual activity: Not on file Other Topics Concern  Not on file Social History Narrative ALLERGIES: Flagyl [metronidazole]; Aspirin; Contrast agent [iodine]; Gabapentin; Gluten; Milk containing products; Morphine; Omnipaque rediflo [iohexol]; and Red dye Review of Systems Constitutional: Negative for fatigue and fever. HENT: Negative for sore throat. Respiratory: Negative for cough, chest tightness and shortness of breath. Cardiovascular: Negative for leg swelling. Gastrointestinal: Positive for abdominal pain, nausea and vomiting. Genitourinary: Negative for dysuria. Musculoskeletal: Negative for back pain. Neurological: Negative for syncope and headaches. Psychiatric/Behavioral: Negative for confusion. Vitals:  
 09/24/18 1017 BP: 122/67 Pulse: 95 Resp: 16 Temp: 97.3 °F (36.3 °C) SpO2: 98% Weight: 50.8 kg (112 lb) Height: 5' 7\" (1.702 m) Physical Exam  
Constitutional: She is oriented to person, place, and time. She appears well-developed and well-nourished. No distress. HENT:  
Head: Normocephalic and atraumatic. Eyes: Conjunctivae and EOM are normal. Pupils are equal, round, and reactive to light. Neck: Normal range of motion. Neck supple. Cardiovascular: Normal rate, regular rhythm and normal heart sounds. Pulmonary/Chest: Effort normal and breath sounds normal. No respiratory distress. She has no wheezes. She has no rales. Abdominal: Soft. She exhibits no distension. There is tenderness. There is no rebound. Moderate diffuse tenderness to palpation. Most pronounced in the left lower quadrant. Musculoskeletal: Normal range of motion. She exhibits no edema or tenderness. Neurological: She is alert and oriented to person, place, and time. Skin: Skin is warm and dry. No rash noted. She is not diaphoretic. Psychiatric: She has a normal mood and affect. Her behavior is normal.  
Vitals reviewed. MDM Number of Diagnoses or Management Options Diverticulitis: new and does not require workup Diagnosis management comments: Elevated white blood cell count and CT demonstrating possible continued diverticulitis. Discussed case with surgery he'll be admitting for further evaluation and treatment. Heber Liz MD; 9/24/2018 @5:07 PM Voice dictation software was used during the making of this note. This software is not perfect and grammatical and other typographical errors may be present. This note has not been proofread for errors. 
=================================================================== Amount and/or Complexity of Data Reviewed Clinical lab tests: reviewed and ordered (Results for orders placed or performed during the hospital encounter of 09/24/18 
-CBC WITH AUTOMATED DIFF      Result                                            Value Ref Range WBC                                               23.1 (H)                      4.3 - 11.1 K/uL           
     RBC                                               3.96 (L)                      4.05 - 5.2 M/uL HGB                                               10.4 (L)                      11.7 - 15.4 g/dL HCT                                               32.9 (L)                      35.8 - 46.3 % MCV                                               83.1                          79.6 - 97.8 FL            
     MCH                                               26.3                          26.1 - 32.9 PG            
     MCHC                                              31.6                          31.4 - 35.0 g/dL RDW                                               16.6                          % PLATELET                                          293                           150 - 450 K/uL MPV                                               10.3                          9.4 - 12.3 FL ABSOLUTE NRBC                                     0.00                          0.0 - 0.2 K/uL            
     DF                                                AUTOMATED NEUTROPHILS                                       92 (H)                        43 - 78 % LYMPHOCYTES                                       3 (L)                         13 - 44 % MONOCYTES                                         4                             4.0 - 12.0 % EOSINOPHILS                                       0 (L)                         0.5 - 7.8 %      BASOPHILS                                         0                             0.0 - 2.0 %               
 IMMATURE GRANULOCYTES                             1                             0.0 - 5.0 %               
     ABS. NEUTROPHILS                                  21.3 (H)                      1.7 - 8.2 K/UL            
     ABS. LYMPHOCYTES                                  0.6                           0.5 - 4.6 K/UL            
     ABS. MONOCYTES                                    0.9                           0.1 - 1.3 K/UL            
     ABS. EOSINOPHILS                                  0.1                           0.0 - 0.8 K/UL            
     ABS. BASOPHILS                                    0.1                           0.0 - 0.2 K/UL            
     ABS. IMM. GRANS.                                  0.2                           0.0 - 0.5 K/UL            
-LIPASE Result                                            Value                         Ref Range Lipase                                            114                           73 - 393 U/L              
-METABOLIC PANEL, COMPREHENSIVE Result                                            Value                         Ref Range Sodium                                            141                           136 - 145 mmol/L Potassium                                         4.5                           3.5 - 5.1 mmol/L Chloride                                          109 (H)                       98 - 107 mmol/L           
     CO2                                               26                            21 - 32 mmol/L Anion gap                                         6 (L)                         7 - 16 mmol/L Glucose                                           77                            65 - 100 mg/dL      BUN                                               11                            8 - 23 MG/DL              
 Creatinine                                        0.96                          0.6 - 1.0 MG/DL           
     GFR est AA                                        >60                           >60 ml/min/1.73m2 GFR est non-AA                                    >60                           >60 ml/min/1.73m2 Calcium                                           8.5                           8.3 - 10.4 MG/DL Bilirubin, total                                  0.8                           0.2 - 1.1 MG/DL           
     ALT (SGPT)                                        24                            12 - 65 U/L               
     AST (SGOT)                                        38 (H)                        15 - 37 U/L Alk. phosphatase                                  134                           50 - 136 U/L Protein, total                                    6.6                           6.3 - 8.2 g/dL Albumin                                           2.9 (L)                       3.2 - 4.6 g/dL Globulin                                          3.7 (H)                       2.3 - 3.5 g/dL A-G Ratio                                         0.8 (L)                       1.2 - 3.5                 
-URINE MICROSCOPIC Result                                            Value                         Ref Range WBC                                               5-10                          0 /hpf                    
     RBC                                               20-50                         0 /hpf Epithelial cells                                  3-5                           0 /hpf      Bacteria                                          TRACE                         0 /hpf                    
 Casts                                             0-3                           0 /lpf                    
) 
Tests in the radiology section of CPT®: ordered and reviewed (Xr Abd (ap And Erect Or Decub) Result Date: 9/24/2018 EXAM:  XR ABD (AP AND ERECT OR DECUB) INDICATION:  abd pain COMPARISON: Abdominal CT 8/14/2018. TECHNIQUE: AP upright and supine views of the abdomen were obtained. FINDINGS: The upright view shows no evidence of gas beneath the diaphragm to suggest the presence of pneumoperitoneum. Nonspecific bowel gas pattern with multiple mildly dilated bowel loops in the left lower quadrant. There are surgical skin staples. The right upper quadrant surgical clips. The included portions of the lung bases are clear. IMPRESSION: No evidence of pneumoperitoneum within the limits of the upright views. Nonspecific bowel gas pattern with multiple mildly distended small bowel loops. Surgical chain staples and clips overlie the pelvis, likely from prior abdominal intervention. Ct Abd Pelv Wo Cont Result Date: 9/24/2018 EXAMINATION: CT ABDOMEN AND PELVIS DATE: 9/24/2018 3:33 PM ACCESSION NUMBER:  [id number] INDICATION: : abd pain, Postop COMPARISON: Abdominal x-ray 9/24/2018, abdominal CT 8/14/2018 TECHNIQUE: Contiguous axial CT images of the abdomen and pelvis were obtained without intravenous contrast. Oral contrast was also administered. Radiation dose reduction techniques were used for this study:  Our CT scanners use one or all of the following: Automated exposure control, adjustment of the mA and/or kVp according to patient's size, iterative reconstruction. FINDINGS: Please note that the detection of solid organ abnormalities is diminished in the absence of intravenous contrast. In the time interval since the 8/14/2018 abdominal CT, a midline laparotomy and a partial colectomy at the level of the sigmoid colon have been performed.  There is primary anastomosis at the level of the colonic resection. The anastomosis is unremarkable. There is no definitive adjacent bowel wall thickening. There is no evidence of free gas adjacent to the anastomosis to suggest a perforation adjacent to the anastomosis. Within the limits of the absence of intravenous contrast there is no definite evidence of a fluid collection adjacent to the anastomosis. There are a few diverticula projecting posteriorly from the ascending colon. There is stranding of the fat adjacent to these diverticula. This is best visualized on axial image 46. This stranding is worse than on the prior exam. There are multiple mildly dilated small bowel loops in the upper abdomen, measuring up to 2.5 cm. Contrast passes through these loops, and there is no transition point to suggest a high-grade small bowel obstruction. There is no pneumatosis or small bowel wall thickening. There is no free intraperitoneal gas to suggest the presence of a bowel perforation. The stomach is unremarkable. Unremarkable noncontrast appearance of the spleen, adrenal glands, pancreas, liver, and kidneys. Prior cholecystectomy. The urinary bladder is decompressed. The uterus and adnexa are unremarkable. Abdominal aortic atherosclerosis. No evidence of a subcutaneous fluid collection along the surgical incision. Sclerotic bone island in the inferior pubic ramus on the right and within multiple locations in the left iliac wing. Lumbar spine spondylosis. There is minimal low density free fluid in the dependent portions of the pelvis. IMPRESSION: Surgical changes status post partial sigmoidectomy and primary reanastomosis. There is no evidence of a fluid collection at the level of the anastomosis within the limits of noncontrast CT. There is no evidence of bowel perforation at the anastomosis or anywhere in the abdomen.  There is subtly worsened stranding of the mesenteric fat adjacent to multiple diverticula involving the ascending colon. While some of the stranding could be related to postoperative fluid in the paracolic gutter, a diverticulitis is also possible. Clinical correlation for associated symptoms is recommended. There are a few mildly dilated small bowel loops in the midabdomen, without evidence of a bowel obstruction. There is no pneumatosis, wall thickening, or bowel perforation. This is nonspecific and could be related to enteritis or mild postoperative ileus. VOICE DICTATED BY: Dr. Chloé Farley ) Review and summarize past medical records: yes Discuss the patient with other providers: yes Independent visualization of images, tracings, or specimens: yes Risk of Complications, Morbidity, and/or Mortality Presenting problems: high Diagnostic procedures: moderate Management options: moderate Patient Progress Patient progress: stable ED Course Comment By Time Nurses having difficulty establishing Turner Gill MD 09/24 7673 Chem hemolyzed Mariaelena Hwang MD 09/24 2960 Discussed case with surgery who are familiar with patient. They'll be placing orders for antibiotics given patient's multiple antibiotic allergies and failed treatment Mariaelena Hwang MD 09/24 3838 Procedures

## 2018-09-25 LAB
ALBUMIN SERPL-MCNC: 2.4 G/DL (ref 3.2–4.6)
ALBUMIN/GLOB SERPL: 0.7 {RATIO} (ref 1.2–3.5)
ALP SERPL-CCNC: 131 U/L (ref 50–136)
ALT SERPL-CCNC: 16 U/L (ref 12–65)
ANION GAP SERPL CALC-SCNC: 8 MMOL/L (ref 7–16)
AST SERPL-CCNC: 20 U/L (ref 15–37)
BASOPHILS # BLD: 0 K/UL (ref 0–0.2)
BASOPHILS NFR BLD: 0 % (ref 0–2)
BILIRUB SERPL-MCNC: 0.4 MG/DL (ref 0.2–1.1)
BUN SERPL-MCNC: 8 MG/DL (ref 8–23)
CALCIUM SERPL-MCNC: 8.2 MG/DL (ref 8.3–10.4)
CHLORIDE SERPL-SCNC: 109 MMOL/L (ref 98–107)
CO2 SERPL-SCNC: 26 MMOL/L (ref 21–32)
CREAT SERPL-MCNC: 0.77 MG/DL (ref 0.6–1)
DIFFERENTIAL METHOD BLD: ABNORMAL
EOSINOPHIL # BLD: 0.1 K/UL (ref 0–0.8)
EOSINOPHIL NFR BLD: 1 % (ref 0.5–7.8)
ERYTHROCYTE [DISTWIDTH] IN BLOOD BY AUTOMATED COUNT: 16.3 %
GLOBULIN SER CALC-MCNC: 3.3 G/DL (ref 2.3–3.5)
GLUCOSE SERPL-MCNC: 86 MG/DL (ref 65–100)
HCT VFR BLD AUTO: 28.3 % (ref 35.8–46.3)
HGB BLD-MCNC: 9.2 G/DL (ref 11.7–15.4)
IMM GRANULOCYTES # BLD: 0.1 K/UL (ref 0–0.5)
IMM GRANULOCYTES NFR BLD AUTO: 1 % (ref 0–5)
LYMPHOCYTES # BLD: 1.2 K/UL (ref 0.5–4.6)
LYMPHOCYTES NFR BLD: 8 % (ref 13–44)
MAGNESIUM SERPL-MCNC: 2.2 MG/DL (ref 1.8–2.4)
MCH RBC QN AUTO: 26.3 PG (ref 26.1–32.9)
MCHC RBC AUTO-ENTMCNC: 32.5 G/DL (ref 31.4–35)
MCV RBC AUTO: 80.9 FL (ref 79.6–97.8)
MONOCYTES # BLD: 0.7 K/UL (ref 0.1–1.3)
MONOCYTES NFR BLD: 5 % (ref 4–12)
NEUTS SEG # BLD: 12.8 K/UL (ref 1.7–8.2)
NEUTS SEG NFR BLD: 86 % (ref 43–78)
NRBC # BLD: 0 K/UL (ref 0–0.2)
PHOSPHATE SERPL-MCNC: 3.2 MG/DL (ref 2.3–3.7)
PLATELET # BLD AUTO: 276 K/UL (ref 150–450)
PMV BLD AUTO: 10.7 FL (ref 9.4–12.3)
POTASSIUM SERPL-SCNC: 3.6 MMOL/L (ref 3.5–5.1)
PROT SERPL-MCNC: 5.7 G/DL (ref 6.3–8.2)
RBC # BLD AUTO: 3.5 M/UL (ref 4.05–5.2)
SODIUM SERPL-SCNC: 143 MMOL/L (ref 136–145)
WBC # BLD AUTO: 15 K/UL (ref 4.3–11.1)

## 2018-09-25 PROCEDURE — 74011250637 HC RX REV CODE- 250/637: Performed by: NURSE PRACTITIONER

## 2018-09-25 PROCEDURE — 65270000029 HC RM PRIVATE

## 2018-09-25 PROCEDURE — 77030008031

## 2018-09-25 PROCEDURE — 85025 COMPLETE CBC W/AUTO DIFF WBC: CPT

## 2018-09-25 PROCEDURE — 74011000258 HC RX REV CODE- 258: Performed by: NURSE PRACTITIONER

## 2018-09-25 PROCEDURE — 77030020253 HC SOL INJ D545NS .05 DEX .45 SAL

## 2018-09-25 PROCEDURE — 74011000250 HC RX REV CODE- 250: Performed by: NURSE PRACTITIONER

## 2018-09-25 PROCEDURE — 83735 ASSAY OF MAGNESIUM: CPT

## 2018-09-25 PROCEDURE — 74011250637 HC RX REV CODE- 250/637: Performed by: SURGERY

## 2018-09-25 PROCEDURE — 80053 COMPREHEN METABOLIC PANEL: CPT

## 2018-09-25 PROCEDURE — 74011250636 HC RX REV CODE- 250/636: Performed by: NURSE PRACTITIONER

## 2018-09-25 PROCEDURE — 84100 ASSAY OF PHOSPHORUS: CPT

## 2018-09-25 RX ORDER — CARBIDOPA AND LEVODOPA 25; 100 MG/1; MG/1
2 TABLET, ORALLY DISINTEGRATING ORAL
Status: DISCONTINUED | OUTPATIENT
Start: 2018-09-25 | End: 2018-09-27 | Stop reason: HOSPADM

## 2018-09-25 RX ORDER — CARBIDOPA AND LEVODOPA 25; 100 MG/1; MG/1
1.5 TABLET, ORALLY DISINTEGRATING ORAL
COMMUNITY
End: 2020-01-15 | Stop reason: CLARIF

## 2018-09-25 RX ADMIN — PREGABALIN 50 MG: 50 CAPSULE ORAL at 08:23

## 2018-09-25 RX ADMIN — CEFTRIAXONE SODIUM 2 G: 2 INJECTION, POWDER, FOR SOLUTION INTRAMUSCULAR; INTRAVENOUS at 15:58

## 2018-09-25 RX ADMIN — Medication: at 01:19

## 2018-09-25 RX ADMIN — HYDROMORPHONE HYDROCHLORIDE 0.5 MG: 1 INJECTION, SOLUTION INTRAMUSCULAR; INTRAVENOUS; SUBCUTANEOUS at 06:38

## 2018-09-25 RX ADMIN — SODIUM CHLORIDE 12.5 MG: 9 INJECTION INTRAMUSCULAR; INTRAVENOUS; SUBCUTANEOUS at 15:57

## 2018-09-25 RX ADMIN — CARBIDOPA AND LEVODOPA 1 TABLET: 25; 100 TABLET ORAL at 00:40

## 2018-09-25 RX ADMIN — DEXTROSE MONOHYDRATE AND SODIUM CHLORIDE 100 ML/HR: 5; .45 INJECTION, SOLUTION INTRAVENOUS at 15:58

## 2018-09-25 RX ADMIN — CARBIDOPA AND LEVODOPA 2 TABLET: 25; 100 TABLET, ORALLY DISINTEGRATING ORAL at 19:56

## 2018-09-25 RX ADMIN — MIDODRINE HYDROCHLORIDE 2.5 MG: 5 TABLET ORAL at 15:57

## 2018-09-25 RX ADMIN — HYDROMORPHONE HYDROCHLORIDE 0.5 MG: 1 INJECTION, SOLUTION INTRAMUSCULAR; INTRAVENOUS; SUBCUTANEOUS at 10:56

## 2018-09-25 RX ADMIN — CARBIDOPA AND LEVODOPA 2 TABLET: 25; 100 TABLET, ORALLY DISINTEGRATING ORAL at 11:00

## 2018-09-25 RX ADMIN — MIDODRINE HYDROCHLORIDE 2.5 MG: 5 TABLET ORAL at 08:24

## 2018-09-25 RX ADMIN — ONDANSETRON 4 MG: 2 INJECTION INTRAMUSCULAR; INTRAVENOUS at 12:14

## 2018-09-25 RX ADMIN — CARBIDOPA AND LEVODOPA 2 TABLET: 25; 100 TABLET, ORALLY DISINTEGRATING ORAL at 16:00

## 2018-09-25 RX ADMIN — FAMOTIDINE 20 MG: 10 INJECTION, SOLUTION INTRAVENOUS at 19:59

## 2018-09-25 RX ADMIN — CARBIDOPA AND LEVODOPA 2 TABLET: 25; 100 TABLET, ORALLY DISINTEGRATING ORAL at 13:30

## 2018-09-25 RX ADMIN — METHIMAZOLE 5 MG: 5 TABLET ORAL at 08:24

## 2018-09-25 RX ADMIN — CARBIDOPA AND LEVODOPA 2 TABLET: 25; 100 TABLET, ORALLY DISINTEGRATING ORAL at 18:30

## 2018-09-25 RX ADMIN — PREGABALIN 50 MG: 50 CAPSULE ORAL at 19:57

## 2018-09-25 RX ADMIN — DEXTROSE MONOHYDRATE AND SODIUM CHLORIDE 100 ML/HR: 5; .45 INJECTION, SOLUTION INTRAVENOUS at 06:38

## 2018-09-25 RX ADMIN — FAMOTIDINE 20 MG: 10 INJECTION, SOLUTION INTRAVENOUS at 08:23

## 2018-09-25 RX ADMIN — MIDODRINE HYDROCHLORIDE 2.5 MG: 5 TABLET ORAL at 19:57

## 2018-09-25 RX ADMIN — ONDANSETRON 4 MG: 2 INJECTION INTRAMUSCULAR; INTRAVENOUS at 08:29

## 2018-09-25 NOTE — PROGRESS NOTES
TRANSFER - IN REPORT: 
 
Verbal report received from Cody Bynum on Marino Nab  being received from (ED) for routine progression of care Report consisted of patients Situation, Background, Assessment and  
Recommendations(SBAR). Information from the following report(s) ED Summary was reviewed with the receiving nurse. Opportunity for questions and clarification was provided. Assessment completed upon patients arrival to unit and care assumed. Beverly Gabriel

## 2018-09-25 NOTE — PROGRESS NOTES
Patient arrived to the unit alert and oriented x4. Patient states she is comfortable, denying pain or nausea. Patient she already had taken her routine meds prior to her admission and has no meds currently w/ her.
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
## 2018-09-25 NOTE — PROGRESS NOTES
[facility] 
[facility]. [address], [address] [facility], [address] 
[phone] H&P Note  
Alethea Strauss   MRN: [id number]     : 1950 HPI: Alethea Strauss is a 76 y.o. female who is s/p open sigmoid colectomy with EEA anastomosis and drainage of localized abscess 18. Her hospitalization was uneventful. Bowel function returned on POD 2 and she was started on a clear liquid diet. Her diet was advanced slowly and she was able to tolerate a soft diet at discharge. OR cultures grew e.coli and she was treated with Rocephin inpatient and sent to rehab with DEMARIO VILLEGAS for 7 days. She reported to the ER today with complaints of abdominal pain \"all over\". ER work up was impressive for a WBC of 28,100. She is AF with VSS. A CT scan was obtained that is concerning for diverticulitis. Her abdomen is benign. She denies fever or chills. She has been tolerating a soft diet. She is moving her bowels. 18: Awake in bed, no complaints, WBC down to 15. Abdomen remains benign. Past surgical history: She was seen in our office following EGD/colonoscopy by Dr. Mariluz Ibarra in early August and a follow-up CT scan performed on . I reviewed those reports and images from the CT scan. She has continued to have recurrent bouts of abdominal pain throughout this time. She has had multiple visits to the emergency room. She has been placed on antibiotics multiple times. It is apparent that she has chronic recurrent sigmoid diverticulitis. There may also be other things going on with her colon. The CT did show some colitis in the ascending colon. Her complaints are primarily left lower quadrant. She does have some generalized complaints as well. She was placed on Megace which seems to be increasing her appetite. She was present with a caregiver today who says that she will actually eat through the night. She was also placed on mesalamine but she is having difficulty swallowing that pill. She is currently afebrile. She reports having a bowel movement once every 2 weeks. The caregiver corrected her since said that she is actually having fairly regular bowel movements almost daily this week. She is taking MiraLAX. She is still complaining of hard stools and may need to adjust that dosing. I had seen her in January 2018 in the office for 4 week follow-up of her complicated diverticulitis. I also saw her in December 2017. A few days after that visit she went to the emergency department for continued complaints of abdominal pain. A follow-up CT scan was performed which shows complete resolution of her diverticulitis and abscess cavity. Her all of her blood work was normal.  She was supposed to have an appointment with Dr. Sallie Do last week but this was canceled due to the snowy conditions. She will set up a repeat appointment with him. She still has many aches and pains. These appear to be chronic. I reviewed her new CT scan and attached the corresponding image from prior image with abscess. This shows resolution and only chronic other findings. On last visit in December 2017, she comes in not feeling well. She complains of loss of appetite, nausea, abdominal and chest pain. She was seen by Dr. Sallie Do within the past 2 days. I was able to speak with him as well. He ordered blood work which was all without abnormality. He did order a repeat CT scan which will be performed next week. If that scan has remained normalized then he will proceed with colonoscopy. She denies fevers but she does report some chills. She says she has lost additional weight. Her weight today is 111 pounds and was 114 pounds 4 weeks ago. She claims her normal weight is 135 pounds. When I saw her frequent 4 weeks ago she had just started a 30 day course of Cipro and Flagyl.   She claims that she started developing some rash and swelling 2 days ago and stopped the antibiotics at that time but should have nearly completed that course. She was [facility] by [facility] for evaluation of diverticulitis with peridiverticular abscess. Patient is very pleasant woman who presents with a caregiver who she uses due to Parkinson's disease. She moved from [place] in 2015. She was diagnosed with acute diverticulitis with peridiverticular abscess on September 15, 2017. This was at [facility] and she was managed with antibiotics. The originally planned to place a percutaneous drain but that was not required. I do not have access to that CT scan. She had follow-up CT scans one month later on October 16 and then again more recently on October 27. Both of those studies showed continued improvement of the size of the peridiverticular abscess which originally was close to 3 cm then decreased to 1.6 cm and now on the recent study is 1.1 cm and may actually represent a large diverticulum or a resolving abscess. There is very little evidence of residual inflammation. She continues to have some abdominal symptoms but she is not having any fevers or severe abdominal pain. She has chronic constipation and does take a dose of MiraLAX per day. She still has very hard balls for stool. She has not seen any ribbon stools because of these ball-like stools. She had been on Augmentin prior to recent visit with Dr. Jenni Bonds. He saw her 2 days ago on November 15 and change her antibiotics from Augmentin to Cipro and Flagyl. He placed her on a 30 day regimen. In further questioning she is taking medications with her MiraLAX. I discussed with her that this may actually affect the absorption of any of her medications as the water bound to MiraLAX is a reversible and must exit the body via the anus.   This will frequently capture medications as well and that practice of taking any medications within one half hour of MiraLAX is discouraged. She has one prior episode in her history of diverticulitis. This was in 2015 weeks she was still living in [place]. Similarly she had a peridiverticular abscess that did not require drainage and was treated with antibiotics. Her last colonoscopy was in 2014 or 2015 prior to her episode of diverticulitis. She was noted to have diverticulosis at that time according to her. She has had upper endoscopies type Dr. Jose Renee recently. These were unremarkable. I have believe he is planning on doing a colonoscopy once there is resolution of her diverticulitis. My usual recommendation is no sooner than 6 weeks to 3 months following resolution. This is done sooner if there is a suspicion of malignancy. Her abdominal surgical history is fairly insignificant. She has had a laparoscopic cholecystectomy as well as bilateral tubal ligation. She has not had any colon procedures. She has noted allergies to oral and IV iodinated contrast as well as metronidazole. All of these have been used recently or currently and seem to be without issue. Pathology was reviewed while inpatient.  
  
Pathology  
  
 DIAGNOSIS  
SIGMOID COLON: DIVERTICULOSIS WITH DIVERTICULITIS. sms/9/6/2018 Electronically signed out on 9/6/2018 13:09 by Art Coto. Heidi Hodges MD   
 
Past Medical History:  
Diagnosis Date  Abnormal gait 7/5/2016  Acute serous otitis media of left ear  Anxiety  Bilateral carpal tunnel syndrome 7/5/2016  Chronic abdominal pain 7/5/2016  Dementia due to Parkinson's disease without behavioral disturbance ([facility] [address] 7/5/2016  Depression   
 managed by meds  Diverticulitis  Gastritis  GERD (gastroesophageal reflux disease)  Heart murmur   
 followed by [facility] cardiology. last echo10/2015  Hypercholesterolemia  Hypertension   
 daily meds, on norvasc prn- took one yesterday for BP of 152/68  Neuropathic pain 7/5/2016  Nontoxic multinodular goiter 7/5/2016  Orthostatic hypotension   
 managed by meds  Parkinson's disease ([facility] [address]   
 dx 2014- sometimes requires walker or wheelchair when tired  Peripheral neuropathy  Syncope 10/10/2015  Type 2 diabetes mellitus without complication 2/2/9226 [place] thought I was a diabetic\" - no meds- started checking glucose 2 yrs ago; last A1C=? ; has not checked since 5 mos ago (June 2016)  Urinary frequency 7/5/2016  Vaginal bleeding  Weight loss, unintentional   
 50 lbs over 2 yrs Past Surgical History:  
Procedure Laterality Date  COLONOSCOPY N/A 8/6/2018 COLONOSCOPY/ 19 performed by Shreyas Butt MD at [address]  HX CHOLECYSTECTOMY  [address] Current Facility-Administered Medications Medication Dose Route Frequency  lip protectant (BLISTEX) ointment   Topical PRN  
 . PHARMACY TO SUBSTITUTE PER PROTOCOL (Reordered from: carbidopa-levodopa (PARCOPA)  mg rapid dissolve tablet)    Per Protocol  sodium chloride (NS) flush 5-10 mL  5-10 mL IntraVENous Q8H  
 sodium chloride (NS) flush 5-10 mL  5-10 mL IntraVENous PRN  
 HYDROmorphone (PF) (DILAUDID) injection 0.5 mg  0.5 mg IntraVENous Q4H PRN  
 naloxone (NARCAN) injection 0.4 mg  0.4 mg IntraVENous PRN  
 ondansetron (ZOFRAN) injection 4 mg  4 mg IntraVENous Q4H PRN  
 cefTRIAXone (ROCEPHIN) 2 g in 0.9% sodium chloride (MBP/ADV) 50 mL  2 g IntraVENous Q24H  ALPRAZolam (XANAX) tablet 0.5 mg  0.5 mg Oral TID PRN  
 methIMAzole (TAPAZOLE) tablet 5 mg  5 mg Oral DAILY  midodrine (PROAMITINE) tablet 2.5 mg  2.5 mg Oral TID  pregabalin (LYRICA) capsule 50 mg  50 mg Oral BID  famotidine (PF) (PEPCID) 20 mg in sodium chloride 0.9% 10 mL injection  20 mg IntraVENous Q12H  
 dextrose 5 % - 0.45% NaCl infusion  100 mL/hr IntraVENous CONTINUOUS ALLERGIES:  Flagyl [metronidazole];  Aspirin; Contrast agent [iodine]; Gabapentin; Gluten; Milk containing products; Morphine; Omnipaque rediflo [iohexol]; and Red dye Social History Social History  Marital status:  Spouse name: N/A  
 Number of children: N/A  
 Years of education: N/A Social History Main Topics  Smoking status: Never Smoker  Smokeless tobacco: Never Used  Alcohol use No  
 Drug use: No  
 Sexual activity: Not Asked Other Topics Concern  None Social History Narrative History Smoking Status  Never Smoker Smokeless Tobacco  
 Never Used Family History Problem Relation Age of Onset  Alzheimer Mother  Diabetes Father  Cancer Father   
  prostate  Stroke Father  Diabetes Sister  Diabetes Brother  Cancer Brother   
  kidney and prostate  Sickle Cell Anemia Son   
  2 sons  Breast Cancer Neg Hx   
 
 
ROS: The patient has no difficulty with chest pain or shortness of breath. No fever or chills. The patient denies any personal or family history of abnormal clotting or bleeding. Comprehensive review of systems was otherwise unremarkable except as noted above. Physical Exam:  
Constitutional: Alert oriented cooperative patient in no acute distress. Visit Vitals  /78 (BP 1 Location: Left arm, BP Patient Position: Sitting) Comment: 120/80 standing  Pulse 76  Resp 19  
 Ht 5' 7\" (1.702 m)  Wt 119 lb 1.6 oz (54 kg)  SpO2 98%  BMI 18.65 kg/m2 Eyes:Sclera are clear without icterus. ENMT: no obvious neck masses, no ear or lip lesions CV: RRR. Normal perfusion Resp: No JVD. Breathing is  non-labored. GI: thin, soft, non-distended, surgical incision c/d/i with staples, no ttp, no rebound, no guarding. Musculoskeletal: unremarkable with normal function. Neuro:  No obvious focal deficits Psychiatric: normal affect and mood, no memory impairment Lab Results Component Value Date/Time  WBC 15.0 (H) 09/25/2018 04:30 AM  
 HGB 9.2 (L) 09/25/2018 04:30 AM  
 HCT 28.3 (L) 09/25/2018 04:30 AM  
 PLATELET 599 63/26/1649 04:30 AM  
 MCV 80.9 09/25/2018 04:30 AM  
 
 
Lab Results Component Value Date/Time Sodium 143 09/25/2018 04:30 AM  
 Potassium 3.6 09/25/2018 04:30 AM  
 Chloride 109 (H) 09/25/2018 04:30 AM  
 CO2 26 09/25/2018 04:30 AM  
 BUN 8 09/25/2018 04:30 AM  
 Creatinine 0.77 09/25/2018 04:30 AM  
 Glucose 86 09/25/2018 04:30 AM  
 Bilirubin, total 0.4 09/25/2018 04:30 AM  
 AST (SGOT) 20 09/25/2018 04:30 AM  
 Alk. phosphatase 131 09/25/2018 04:30 AM  
 Amylase 76 11/22/2016 11:14 AM  
 Lipase 114 09/24/2018 01:09 PM  
 
Lab Results Component Value Date/Time ALT (SGPT) 16 09/25/2018 04:30 AM  
 
CT OF THE ABDOMEN AND PELVIS WITH CONTRAST: 10/27/2017 CLINICAL INDICATION: Abdominal pain, follow-up diverticular abscess in the left 
lower abdomen. PROCEDURE: Serial thin section axial images obtained from the lung bases through 
the proximal femurs following the administration of 100 cc of Isovue 370 
intravenous contrast.  Radiation dose reduction techniques were used for this 
study. Our CT scanners use one or all of the following: Automated exposure 
control, adjusted of the mA and/or kV according to patient size, iterative 
reconstruction COMPARISON: CT of the abdomen and pelvis dated 10/16/2017 and 9/15/2017 FINDINGS:  
CT ABDOMEN: There is a slight decrease in the size of the extraluminal air in 
the left lower abdominal quadrant that now measures only 11 mm. This is likely 
residual abscess. Note there is no discernible fluid within the collection. Patient is status post cholecystectomy with mild intra and extra hepatic bile 
duct dilation. No obvious stones. The pancreas, liver, spleen, and bilateral 
kidneys are unremarkable. The adrenal glands are normal. No additional or new 
bowel inflammation. CT PELVIS: There is extensive sigmoid colon diverticulosis. The bladder is well-distended with smooth thin walls. A uterine fibroid is present. No free 
fluid acutely dependently in the pelvis. No aggressive osseous lesions identified. 
  
IMPRESSION: 
1. Minimal residual inflammation in the left lower abdominal quadrant with a 
small presumably extraluminal collection of air that now measures 11 mm in 
keeping with resolving abscess. Note this could also be a prominent diverticula. 2. Uterine fibroid 3. Sigmoid colon diverticulosis. 4. Mild intra and extrahepatic bile duct dilation. Image from 10/16/2017 CT ABDOMEN AND PELVIS WITH CONTRAST: 12/18/2017 HISTORY: abd pain, hx of diverticular dz. wt loss, nausea/vomiting, 67 years Female ?c/o abdominal pain- states she was recently dx with diverticulitis. Pt 
states nausea. Pt also c/o blood in urine and generalized body pain BMI 17.4 COMPARISON: CT abdomen October 27, 2017 TECHNIQUE: Oral contrast was not administered. 100 cc of nonionic intravenous 
contrast was injected, and axial helical CT images were obtained from above the 
diaphragm through the pelvis. Coronal reformatted images were obtained at the 
scanner console and made available for review. Radiation dose reduction 
techniques were used for this study:  Our CT scanners use one or all of the 
following: Automated exposure control, adjustment of the mA and/or kVp according 
to patient's size, iterative reconstruction. FINDINGS: 
ABDOMEN: 
Minimal dependent subsegmental atelectasis bilateral lung bases. Evidence of 
cholecystectomy. Persistent diffuse hepatic steatosis. Normal-appearing 
spleen, pancreas, bilateral adrenal glands. Small simple appearing right renal 
interpolar region cortical cyst unchanged. Subcentimeter left renal cortical 
hypoenhancing lesions also unchanged, too small to characterize. Left renal 
upper pole cortical scarring unchanged. Mild calcific atherosclerosis of a 
normal caliber abdominal aorta.   No evidence of significant lymphadenopathy. Normal-appearing small bowel. No evidence of intraperitoneal free air or free 
fluid. PELVIS: 
Normal-appearing urinary bladder. Ovoid mass in the anterior uterine myometrium 
measuring up to 2.5 cm appears unchanged, may represent a fibroid. Normal-appearing bilateral adnexa. Large stool ball in the rectum. Stool is 
seen throughout the colon. There is no definite evidence of residual 
diverticular abscess. Persistent sigmoid diverticulosis. No evidence of pelvic 
free fluid. No evidence of significant inguinal or pelvic sidewall 
lymphadenopathy. Visualized osseous structures unremarkable. 
  
IMPRESSION:  
1. No acute pathology identified. No evidence of residual diverticular 
abscess. 2.  Other chronic findings as above. 
  
 
 
 
CT OF THE ABDOMEN AND PELVIS WITH CONTRAST: 8/7/2018 CLINICAL INDICATION: Moderate diffuse abdominal pain for three months, 
diverticulitis PROCEDURE: Serial thin section axial images obtained from the lung bases through 
the proximal femurs following the administration of 100 cc of Isovue 370 
intravenous contrast.  Radiation dose reduction techniques were used for this 
study. Our CT scanners use one or all of the following: Automated exposure 
control, adjusted of the mA and/or kV according to patient size, iterative 
reconstruction COMPARISON: CT of the abdomen and pelvis dated 5/31/20181 FINDINGS:  
The lung bases are clear. CT ABDOMEN: The liver, spleen, pancreas, and kidneys are unremarkable. The 
gallbladder is been resected. The adrenal glands are normal. There is no ascites 
or adenopathy. There is a long segment of circumferential wall thickening 
involving the ascending colon with mild inflammation in the pericolonic fat in 
keeping with a colitis. There is more subtle involvement of the transverse colon 
as well. No bowel obstruction or perforation evident. No free air identified. The small bowel is unremarkable. CT PELVIS: The bladder is normal. The rectum and sigmoid colon show diverticular 
change without acute diverticulitis. The uterus and adnexa are unremarkable. No 
free fluid accumulating dependently in the pelvis. No aggressive osseous lesions identified. 
  
IMPRESSION: 
1. Long segment colitis involving the ascending colon and proximal portion of 
the transverse colon. 2. Sigmoid colon diverticulosis. CTAP 9/24/18 IMPRESSION: 
  
Surgical changes status post partial sigmoidectomy and primary reanastomosis. There is no evidence of a fluid collection at the level of the anastomosis 
within the limits of noncontrast CT. There is no evidence of bowel perforation 
at the anastomosis or anywhere in the abdomen. 
  
There is subtly worsened stranding of the mesenteric fat adjacent to multiple 
diverticula involving the ascending colon. While some of the stranding could be 
related to postoperative fluid in the paracolic gutter, a diverticulitis is also 
possible. Clinical correlation for associated symptoms is recommended. 
  
There are a few mildly dilated small bowel loops in the midabdomen, without 
evidence of a bowel obstruction. There is no pneumatosis, wall thickening, or 
bowel perforation. This is nonspecific and could be related to enteritis or mild 
postoperative ileus. Assessment/Plan:     Cesar Yu is a 76 y.o. female who has history of complicated diverticulitis with peridiverticular abscess. She underwent surgery 9/5 for a sigmoid colectomy with drainage of abscess. She had been doing well until recently when she developed abdominal pain. Problem List  Date Reviewed: 9/5/2018 Codes Class Noted * (Principal)Leukocytosis ICD-10-CM: B74.926 ICD-9-CM: 288.60  9/24/2018 Post-operative infection ICD-10-CM: T81. 4XXA ICD-9-CM: 998.59  9/24/2018 Abdominal pain ICD-10-CM: R10.9 ICD-9-CM: 789.00  9/24/2018  Diverticulitis ICD-10-CM: M71.14 
 ICD-9-CM: 562.11  9/24/2018 Diverticulitis of large intestine with abscess ICD-10-CM: K57.20 ICD-9-CM: 562.11, 569.5  9/5/2018 Type 2 diabetes mellitus with nephropathy ([facility] [address] ICD-10-CM: E11.21 
ICD-9-CM: 250.40, 583.81  1/22/2018 Diverticular disease of intestine with perforation and abscess ICD-10-CM: K57.80 ICD-9-CM: 562.11  11/17/2017 Constipation ICD-10-CM: K59.00 ICD-9-CM: 564.00  11/17/2017 Nausea and vomiting ICD-10-CM: R11.2 ICD-9-CM: 787.01  11/7/2016 Stomach ache ICD-10-CM: R10.9 ICD-9-CM: 536.8  11/7/2016 RBD (REM behavioral disorder) ICD-10-CM: G47.52 
ICD-9-CM: 327.42  9/29/2016 Neurologic orthostatic hypotension (HCC) ICD-10-CM: G90.3 ICD-9-CM: 333.0  9/6/2016 Anxiety and depression ICD-10-CM: F41.9, F32.9 ICD-9-CM: 300.00, 311  9/6/2016 Loss of appetite ICD-10-CM: R63.0 ICD-9-CM: 783.0  9/6/2016 Psychosomatic factor in physical condition ICD-10-CM: F54 
ICD-9-CM: 251  9/6/2016 Neuropathy associated with endocrine disorder (HCC) ICD-10-CM: E34.9, G63 ICD-9-CM: 355.9, 259.9  7/21/2016 Parkinson disease [facility]) ICD-10-CM: G20 
ICD-9-CM: 332.0  7/21/2016 Tremor ICD-10-CM: R25.1 ICD-9-CM: 781.0  7/21/2016 Nontoxic multinodular goiter ICD-10-CM: E04.2 ICD-9-CM: 241.1  7/5/2016 Abnormal gait ICD-10-CM: R26.9 ICD-9-CM: 781.2  7/5/2016 Chronic abdominal pain ICD-10-CM: R10.9, G89.29 ICD-9-CM: 789.00, 338.29  7/5/2016 GERD (gastroesophageal reflux disease) ICD-10-CM: K21.9 ICD-9-CM: 530.81  7/5/2016 Anxiety ICD-10-CM: F41.9 ICD-9-CM: 300.00  7/5/2016 Type 2 diabetes mellitus without complication -79-ZX: Y42.2 ICD-9-CM: 250.00  7/5/2016 Bilateral carpal tunnel syndrome ICD-10-CM: G56.03 
ICD-9-CM: 354.0  7/5/2016 Urinary frequency ICD-10-CM: R35.0 ICD-9-CM: 788.41  7/5/2016 Neuropathic pain ICD-10-CM: M79.2 ICD-9-CM: 729.2  7/5/2016 Hypertension ICD-10-CM: I10 
ICD-9-CM: 401.9  Unknown Parkinson's disease ([facility] ICD-10-CM: G20 
ICD-9-CM: 332.0  Unknown Peripheral neuropathy ICD-10-CM: G62.9 ICD-9-CM: 356.9  Unknown Syncope ICD-10-CM: R55 
ICD-9-CM: 780.2  10/10/2015 Parkinsonism ([facility] ICD-10-CM: G20 
ICD-9-CM: 332.0  8/10/2015 Continue IV Rocephin q24 IVF Pepcid/SCD Serial exams Monitor WBC PTA meds restarted NPO except meds D/C norman Pepe, NP

## 2018-09-25 NOTE — PROGRESS NOTES
made initial visit. Pt was alert and verbal appearing comfortable with no pain level expressed or observed.  welcomed pt to DT and shared information about  services.  provided spiritual care through presence, pastoral conversation, and assurance of prayer.

## 2018-09-25 NOTE — ED NOTES
TRANSFER - OUT REPORT: 
 
Verbal report given to MAGY Mcnamara(name) on Marce Salkum  being transferred to surgical(unit) for routine progression of care Report consisted of patients Situation, Background, Assessment and  
Recommendations(SBAR). Information from the following report(s) SBAR and ED Summary was reviewed with the receiving nurse. Lines:  
Peripheral IV 09/24/18 Left Hand (Active) Site Assessment Clean, dry, & intact 9/24/2018 11:35 AM  
Phlebitis Assessment 0 9/24/2018 11:35 AM  
Infiltration Assessment 0 9/24/2018 11:35 AM  
Dressing Status Clean, dry, & intact 9/24/2018 11:35 AM  
Hub Color/Line Status Blue 9/24/2018 11:35 AM  
  
 
Opportunity for questions and clarification was provided. Patient transported with: 
 [employer]

## 2018-09-25 NOTE — PROGRESS NOTES
responded to request for assistance with her HCPOA.  spoke with pt's Rn who stated that pt had just taken medication and is not able to complete form.  visited pt and confirmed Rn's assessment.  left form for pt and her daughter to read and suggested they contact the chaplains tomorrow if they need further assistance.

## 2018-09-25 NOTE — PROGRESS NOTES
Problem: Nutrition Deficit Goal: *Optimize nutritional status Nutrition Reason for assessment: Referral received from nursing admission Malnutrition Screening Tool for recently lost 2-13# without trying and eating poorly due to decreased appetite. Assessment:  
Diet order(s): NPO Food/Nutrition History: Hx provided by pt is nonsensical. She says the food at the rehab facility was \"dog food\". She doesn't know what it was and doesn't know if it was solid food, a liquid diet or pureed food,but it was a \"junked up\" diet. She was told it was a gluten free diet. She says she started a gluten free diet about 2 years ago because someone told her that is what she needed to be on for diverticulitis. She has avoided milk products since childhood and says all the females in her family have stomach problems. She says she was so hungry at the rehab facility but wasn't eating or drinking anything because she did not like it. She would receive a banana and water for breakfast but did not eat the banana because of the seeds in int and she doesn't tolerate bananas except may one or two a month. She wasn't drinking the water because it \"upset\" her stomach. She had her daughter bring her some chicken from [facility] on time but she did not eat much of that because it was too hard. She did eat a peanut butter and jelly sandwich at the end of last week and then started having vomiting on Saturday which she attributes to eating gluten and peanut butter. She says her appetite is \"not good\" and that it's like her body doesn't like food anymore. She then asks when will she be allowed to eat, but doesn't know what she would be able to tolerate eating. \"Upset stomach\" is main barrier to eating which includes abdominal pain and nausea. Denies diarrhea or vomiting except 1 recent episode. She reports she was weighed yesterday at rehab and was 112# and was told that was down 5#.  She does not like nor tolerate Ensure, Ensure clear or Boost. 
 Anthropometrics: Height: 5' 7\" (170.2 cm), Weight: 50.8 kg (112 lb)-unknown source, Body mass index is 17.54 kg/(m^2). BMI class of underweight. Weight hx per EMR ( Based on [facility] care functionality, RD cannot know if these weight are actual versus stated): WT / BMI WEIGHT  
9/5/2018 105 lb 8 oz  
8/31/2018 119 lb  
8/31/2018 119 lb 1.6 oz  
8/20/2018 118 lb  
8/6/2018 118 lb  
8/3/2018 118 lb  
6/15/2018 117 lb  
6/10/2018 120 lb  
6/2/2018 120 lb  
5/24/2018 111 lb  
1/22/2018 111 lb  
12/18/2017 111 lb  
12/15/2017 111 lb 14.4 oz  
11/17/2017 114 lb 12.8 oz  
10/27/2017 117 lb  
10/16/2017 118 lb  
 5.9% change in wt within 1 month c/w severe change. Macronutrient needs: EER:  5729-4526 kcal /day (30-35 kcal/kg listed BW) EPR:   grams protein/day (1.5-2 grams/kg IBW) Intake/Comparative Standards: Current NPO status and IVF do not meet kcal or protein needs Nutrition Diagnosis: Inadequate protein-energy intake r/t inability to consume sufficient oral intake as evidenced by limited diet tolerance and po intake as above with associated weight loss, current NPO status d/t abdominal pain Intervention: 
Meals and snacks:  Await initiation and progression of p.o. diet as GI status allows. Nutrition supplement therapy: Declined d/t intolerance. PN: If it is expected that patient will be unable to tolerate p.o. diet greater than 65% of full liquid diet or greater within 1-2 days, consider TPN. If TPN is pursued, please order nutrition consult for TPN management. Discharge nutrition plan: Too soon to determine. Christopher Quezada, [address], [address] [place] [address]

## 2018-09-25 NOTE — PROGRESS NOTES
Interdisciplinary team rounds were held 9/25/2018 with the following team members:Care Management, Nursing and Nurse Practitioner and the patient. Plan of care discussed. See clinical pathway and/or care plan for interventions and desired outcomes.

## 2018-09-26 LAB
ERYTHROCYTE [DISTWIDTH] IN BLOOD BY AUTOMATED COUNT: 16.3 %
HCT VFR BLD AUTO: 31.4 % (ref 35.8–46.3)
HGB BLD-MCNC: 9.9 G/DL (ref 11.7–15.4)
MCH RBC QN AUTO: 26 PG (ref 26.1–32.9)
MCHC RBC AUTO-ENTMCNC: 31.5 G/DL (ref 31.4–35)
MCV RBC AUTO: 82.4 FL (ref 79.6–97.8)
NRBC # BLD: 0 K/UL (ref 0–0.2)
PLATELET # BLD AUTO: 291 K/UL (ref 150–450)
PMV BLD AUTO: 10.8 FL (ref 9.4–12.3)
RBC # BLD AUTO: 3.81 M/UL (ref 4.05–5.2)
WBC # BLD AUTO: 7.8 K/UL (ref 4.3–11.1)

## 2018-09-26 PROCEDURE — 77030020253 HC SOL INJ D545NS .05 DEX .45 SAL

## 2018-09-26 PROCEDURE — 36415 COLL VENOUS BLD VENIPUNCTURE: CPT

## 2018-09-26 PROCEDURE — 74011000258 HC RX REV CODE- 258: Performed by: NURSE PRACTITIONER

## 2018-09-26 PROCEDURE — 74011250637 HC RX REV CODE- 250/637: Performed by: NURSE PRACTITIONER

## 2018-09-26 PROCEDURE — 65270000029 HC RM PRIVATE

## 2018-09-26 PROCEDURE — 74011250636 HC RX REV CODE- 250/636: Performed by: NURSE PRACTITIONER

## 2018-09-26 PROCEDURE — 85027 COMPLETE CBC AUTOMATED: CPT

## 2018-09-26 PROCEDURE — 74011000250 HC RX REV CODE- 250: Performed by: NURSE PRACTITIONER

## 2018-09-26 RX ADMIN — CARBIDOPA AND LEVODOPA 2 TABLET: 25; 100 TABLET, ORALLY DISINTEGRATING ORAL at 20:20

## 2018-09-26 RX ADMIN — DEXTROSE MONOHYDRATE AND SODIUM CHLORIDE 100 ML/HR: 5; .45 INJECTION, SOLUTION INTRAVENOUS at 23:28

## 2018-09-26 RX ADMIN — HYDROMORPHONE HYDROCHLORIDE 0.5 MG: 1 INJECTION, SOLUTION INTRAMUSCULAR; INTRAVENOUS; SUBCUTANEOUS at 02:20

## 2018-09-26 RX ADMIN — ONDANSETRON 4 MG: 2 INJECTION INTRAMUSCULAR; INTRAVENOUS at 13:57

## 2018-09-26 RX ADMIN — HYDROMORPHONE HYDROCHLORIDE 0.5 MG: 1 INJECTION, SOLUTION INTRAMUSCULAR; INTRAVENOUS; SUBCUTANEOUS at 12:35

## 2018-09-26 RX ADMIN — ONDANSETRON 4 MG: 2 INJECTION INTRAMUSCULAR; INTRAVENOUS at 02:20

## 2018-09-26 RX ADMIN — METHIMAZOLE 5 MG: 5 TABLET ORAL at 10:05

## 2018-09-26 RX ADMIN — CARBIDOPA AND LEVODOPA 2 TABLET: 25; 100 TABLET, ORALLY DISINTEGRATING ORAL at 06:00

## 2018-09-26 RX ADMIN — CARBIDOPA AND LEVODOPA 2 TABLET: 25; 100 TABLET, ORALLY DISINTEGRATING ORAL at 13:30

## 2018-09-26 RX ADMIN — CEFTRIAXONE SODIUM 2 G: 2 INJECTION, POWDER, FOR SOLUTION INTRAMUSCULAR; INTRAVENOUS at 17:30

## 2018-09-26 RX ADMIN — DEXTROSE MONOHYDRATE AND SODIUM CHLORIDE 100 ML/HR: 5; .45 INJECTION, SOLUTION INTRAVENOUS at 12:29

## 2018-09-26 RX ADMIN — CARBIDOPA AND LEVODOPA 2 TABLET: 25; 100 TABLET, ORALLY DISINTEGRATING ORAL at 16:00

## 2018-09-26 RX ADMIN — PREGABALIN 50 MG: 50 CAPSULE ORAL at 10:05

## 2018-09-26 RX ADMIN — MIDODRINE HYDROCHLORIDE 2.5 MG: 5 TABLET ORAL at 10:04

## 2018-09-26 RX ADMIN — CARBIDOPA AND LEVODOPA 2 TABLET: 25; 100 TABLET, ORALLY DISINTEGRATING ORAL at 18:30

## 2018-09-26 RX ADMIN — CARBIDOPA AND LEVODOPA 2 TABLET: 25; 100 TABLET, ORALLY DISINTEGRATING ORAL at 08:30

## 2018-09-26 RX ADMIN — Medication 10 ML: at 21:38

## 2018-09-26 RX ADMIN — FAMOTIDINE 20 MG: 10 INJECTION, SOLUTION INTRAVENOUS at 20:27

## 2018-09-26 RX ADMIN — DEXTROSE MONOHYDRATE AND SODIUM CHLORIDE 100 ML/HR: 5; .45 INJECTION, SOLUTION INTRAVENOUS at 02:22

## 2018-09-26 RX ADMIN — MIDODRINE HYDROCHLORIDE 2.5 MG: 5 TABLET ORAL at 17:30

## 2018-09-26 RX ADMIN — CARBIDOPA AND LEVODOPA 2 TABLET: 25; 100 TABLET, ORALLY DISINTEGRATING ORAL at 11:00

## 2018-09-26 RX ADMIN — PREGABALIN 50 MG: 50 CAPSULE ORAL at 20:26

## 2018-09-26 RX ADMIN — FAMOTIDINE 20 MG: 10 INJECTION, SOLUTION INTRAVENOUS at 10:05

## 2018-09-26 RX ADMIN — ALPRAZOLAM 0.5 MG: 0.5 TABLET ORAL at 20:26

## 2018-09-26 RX ADMIN — Medication 10 ML: at 13:56

## 2018-09-26 NOTE — PROGRESS NOTES
Problem: Falls - Risk of 
Goal: *Absence of Falls Document Merly Barclay Fall Risk and appropriate interventions in the flowsheet. Outcome: Progressing Towards Goal 
Fall Risk Interventions: 
Mobility Interventions: Patient to call before getting OOB Medication Interventions: Patient to call before getting OOB Elimination Interventions: Call light in reach Problem: Pressure Injury - Risk of 
Goal: *Prevention of pressure injury Document Franklyn Scale and appropriate interventions in the flowsheet. Outcome: Progressing Towards Goal 
Pressure Injury Interventions: 
  
 
Moisture Interventions: Absorbent underpads Activity Interventions: Increase time out of bed Mobility Interventions: Pressure redistribution bed/mattress (bed type) Nutrition Interventions: Document food/fluid/supplement intake (NPO)

## 2018-09-26 NOTE — PROGRESS NOTES
[facility] 
[facility] [address] [place] [address], [address] [facility], [address] [place] [address] 
[phone] H&P Note  
Simón Rocha   MRN: [id number]     : 1950 HPI: Simón Rocha is a 76 y.o. female who is s/p open sigmoid colectomy with EEA anastomosis and drainage of localized abscess 18. Her hospitalization was uneventful. Bowel function returned on POD 2 and she was started on a clear liquid diet. Her diet was advanced slowly and she was able to tolerate a soft diet at discharge. OR cultures grew e.coli and she was treated with Rocephin inpatient and sent to rehab with DEMARIO VILLEGAS for 7 days. She reported to the ER today with complaints of abdominal pain \"all over\". ER work up was impressive for a WBC of 28,100. She is AF with VSS. A CT scan was obtained that is concerning for diverticulitis. Her abdomen is benign. She denies fever or chills. She has been tolerating a soft diet. She is moving her bowels. 18: Awake in bed, no complaints, WBC down to 15. Abdomen remains benign. 18: No complaints. WBC down to 7.8. AF Past surgical history: She was seen in our office following EGD/colonoscopy by Dr. Rachel Olivas in early August and a follow-up CT scan performed on . I reviewed those reports and images from the CT scan. She has continued to have recurrent bouts of abdominal pain throughout this time. She has had multiple visits to the emergency room. She has been placed on antibiotics multiple times. It is apparent that she has chronic recurrent sigmoid diverticulitis. There may also be other things going on with her colon. The CT did show some colitis in the ascending colon. Her complaints are primarily left lower quadrant. She does have some generalized complaints as well. She was placed on Megace which seems to be increasing her appetite. She was present with a caregiver today who says that she will actually eat through the night. She was also placed on mesalamine but she is having difficulty swallowing that pill. She is currently afebrile. She reports having a bowel movement once every 2 weeks. The caregiver corrected her since said that she is actually having fairly regular bowel movements almost daily this week. She is taking MiraLAX. She is still complaining of hard stools and may need to adjust that dosing. I had seen her in January 2018 in the office for 4 week follow-up of her complicated diverticulitis. I also saw her in December 2017. A few days after that visit she went to the emergency department for continued complaints of abdominal pain. A follow-up CT scan was performed which shows complete resolution of her diverticulitis and abscess cavity. Her all of her blood work was normal.  She was supposed to have an appointment with Dr. Juan Francisco Sandoval last week but this was canceled due to the snowy conditions. She will set up a repeat appointment with him. She still has many aches and pains. These appear to be chronic. I reviewed her new CT scan and attached the corresponding image from prior image with abscess. This shows resolution and only chronic other findings. On last visit in December 2017, she comes in not feeling well. She complains of loss of appetite, nausea, abdominal and chest pain. She was seen by Dr. Juan Francisco Sandoval within the past 2 days. I was able to speak with him as well. He ordered blood work which was all without abnormality. He did order a repeat CT scan which will be performed next week. If that scan has remained normalized then he will proceed with colonoscopy. She denies fevers but she does report some chills. She says she has lost additional weight. Her weight today is 111 pounds and was 114 pounds 4 weeks ago. She claims her normal weight is 135 pounds. When I saw her frequent 4 weeks ago she had just started a 30 day course of Cipro and Flagyl.   She claims that she started developing some rash and swelling 2 days ago and stopped the antibiotics at that time but should have nearly completed that course. She was [facility] for [facility] of diverticulitis with peridiverticular abscess. Patient is very pleasant woman who presents with a caregiver who she uses due to Parkinson's disease. She moved from [place] in 2015. She was diagnosed with acute diverticulitis with peridiverticular abscess on September 15, 2017. This was at [facility] and she was managed with antibiotics. The originally planned to place a percutaneous drain but that was not required. I do not have access to that CT scan. She had follow-up CT scans one month later on October 16 and then again more recently on October 27. Both of those studies showed continued improvement of the size of the peridiverticular abscess which originally was close to 3 cm then decreased to 1.6 cm and now on the recent study is 1.1 cm and may actually represent a large diverticulum or a resolving abscess. There is very little evidence of residual inflammation. She continues to have some abdominal symptoms but she is not having any fevers or severe abdominal pain. She has chronic constipation and does take a dose of MiraLAX per day. She still has very hard balls for stool. She has not seen any ribbon stools because of these ball-like stools. She had been on Augmentin prior to recent visit with Dr. Karthik Hunter. He saw her 2 days ago on November 15 and change her antibiotics from Augmentin to Cipro and Flagyl. He placed her on a 30 day regimen. In further questioning she is taking medications with her MiraLAX. I discussed with her that this may actually affect the absorption of any of her medications as the water bound to MiraLAX is a reversible and must exit the body via the anus.   This will frequently capture medications as well and that practice of taking any medications within one half hour of MiraLAX is discouraged. She has one prior episode in her history of diverticulitis. This was in 2015 weeks she was still living in [place]. Similarly she had a peridiverticular abscess that did not require drainage and was treated with antibiotics. Her last colonoscopy was in 2014 or 2015 prior to her episode of diverticulitis. She was noted to have diverticulosis at that time according to her. She has had upper endoscopies type Dr. Luz Henderson recently. These were unremarkable. I have believe he is planning on doing a colonoscopy once there is resolution of her diverticulitis. My usual recommendation is no sooner than 6 weeks to 3 months following resolution. This is done sooner if there is a suspicion of malignancy. Her abdominal surgical history is fairly insignificant. She has had a laparoscopic cholecystectomy as well as bilateral tubal ligation. She has not had any colon procedures. She has noted allergies to oral and IV iodinated contrast as well as metronidazole. All of these have been used recently or currently and seem to be without issue. Pathology was reviewed while inpatient.  
  
Pathology  
  
 DIAGNOSIS  
SIGMOID COLON: DIVERTICULOSIS WITH DIVERTICULITIS. sms/9/6/2018 Electronically signed out on 9/6/2018 13:09 by Karl Rivas. Tariq Torres MD   
 
Past Medical History:  
Diagnosis Date  Abnormal gait 7/5/2016  Acute serous otitis media of left ear  Anxiety  Bilateral carpal tunnel syndrome 7/5/2016  Chronic abdominal pain 7/5/2016  Dementia due to Parkinson's disease without behavioral disturbance ([facility] [address] 7/5/2016  Depression   
 managed by meds  Diverticulitis  Gastritis  GERD (gastroesophageal reflux disease)  Heart murmur   
 followed by [facility] cardiology. last echo10/2015  Hypercholesterolemia  Hypertension   
 daily meds, on norvasc prn- took one yesterday for BP of 152/68  Neuropathic pain 7/5/2016  Nontoxic multinodular goiter 7/5/2016  Orthostatic hypotension   
 managed by meds  Parkinson's disease ([facility] [address]   
 dx 2014- sometimes requires walker or wheelchair when tired  Peripheral neuropathy  Syncope 10/10/2015  Type 2 diabetes mellitus without complication 9/8/7771 Meet Cousins thought I was a diabetic\" - no meds- started checking glucose 2 yrs ago; last A1C=? ; has not checked since 5 mos ago (June 2016)  Urinary frequency 7/5/2016  Vaginal bleeding  Weight loss, unintentional   
 50 lbs over 2 yrs Past Surgical History:  
Procedure Laterality Date  COLONOSCOPY N/A 8/6/2018 COLONOSCOPY/ 19 performed by Shanna Kearns MD at [address]  HX CHOLECYSTECTOMY  [address] Current Facility-Administered Medications Medication Dose Route Frequency  lip protectant (BLISTEX) ointment   Topical PRN  
 carbidopa-levodopa (PARCOPA)  mg rapid dissolve tablet 2 Tab (Patient Supplied)  2 Tab Oral 7 times per day  sodium chloride (NS) flush 5-10 mL  5-10 mL IntraVENous Q8H  
 sodium chloride (NS) flush 5-10 mL  5-10 mL IntraVENous PRN  
 HYDROmorphone (PF) (DILAUDID) injection 0.5 mg  0.5 mg IntraVENous Q4H PRN  
 naloxone (NARCAN) injection 0.4 mg  0.4 mg IntraVENous PRN  
 ondansetron (ZOFRAN) injection 4 mg  4 mg IntraVENous Q4H PRN  
 cefTRIAXone (ROCEPHIN) 2 g in 0.9% sodium chloride (MBP/ADV) 50 mL  2 g IntraVENous Q24H  ALPRAZolam (XANAX) tablet 0.5 mg  0.5 mg Oral TID PRN  
 methIMAzole (TAPAZOLE) tablet 5 mg  5 mg Oral DAILY  midodrine (PROAMITINE) tablet 2.5 mg  2.5 mg Oral TID  pregabalin (LYRICA) capsule 50 mg  50 mg Oral BID  famotidine (PF) (PEPCID) 20 mg in sodium chloride 0.9% 10 mL injection  20 mg IntraVENous Q12H  
 dextrose 5 % - 0.45% NaCl infusion  100 mL/hr IntraVENous CONTINUOUS ALLERGIES:  Flagyl [metronidazole];  Aspirin; Contrast agent [iodine]; Gabapentin; Gluten; Milk containing products; Morphine; Omnipaque rediflo [iohexol]; and Red dye Social History Social History  Marital status:  Spouse name: N/A  
 Number of children: N/A  
 Years of education: N/A Social History Main Topics  Smoking status: Never Smoker  Smokeless tobacco: Never Used  Alcohol use No  
 Drug use: No  
 Sexual activity: Not Asked Other Topics Concern  None Social History Narrative History Smoking Status  Never Smoker Smokeless Tobacco  
 Never Used Family History Problem Relation Age of Onset  Alzheimer Mother  Diabetes Father  Cancer Father   
  prostate  Stroke Father  Diabetes Sister  Diabetes Brother  Cancer Brother   
  kidney and prostate  Sickle Cell Anemia Son   
  2 sons  Breast Cancer Neg Hx   
 
 
ROS: The patient has no difficulty with chest pain or shortness of breath. No fever or chills. The patient denies any personal or family history of abnormal clotting or bleeding. Comprehensive review of systems was otherwise unremarkable except as noted above. Physical Exam:  
Constitutional: Alert oriented cooperative patient in no acute distress. Visit Vitals  /78 (BP 1 Location: Left arm, BP Patient Position: Sitting) Comment: 120/80 standing  Pulse 76  Resp 19  
 Ht 5' 7\" (1.702 m)  Wt 119 lb 1.6 oz (54 kg)  SpO2 98%  BMI 18.65 kg/m2 Eyes:Sclera are clear without icterus. ENMT: no obvious neck masses, no ear or lip lesions CV: RRR. Normal perfusion Resp: No JVD. Breathing is  non-labored. GI: thin, soft, non-distended, surgical incision c/d/i with staples, no ttp, no rebound, no guarding. Musculoskeletal: unremarkable with normal function. Neuro:  No obvious focal deficits Psychiatric: normal affect and mood, no memory impairment Lab Results Component Value Date/Time  WBC 15.0 (H) 09/25/2018 04:30 AM  
 HGB 9.2 (L) 09/25/2018 04:30 AM  
 HCT 28.3 (L) 09/25/2018 04:30 AM  
 PLATELET 429 11/64/2101 04:30 AM  
 MCV 80.9 09/25/2018 04:30 AM  
 
 
Lab Results Component Value Date/Time Sodium 143 09/25/2018 04:30 AM  
 Potassium 3.6 09/25/2018 04:30 AM  
 Chloride 109 (H) 09/25/2018 04:30 AM  
 CO2 26 09/25/2018 04:30 AM  
 BUN 8 09/25/2018 04:30 AM  
 Creatinine 0.77 09/25/2018 04:30 AM  
 Glucose 86 09/25/2018 04:30 AM  
 Bilirubin, total 0.4 09/25/2018 04:30 AM  
 AST (SGOT) 20 09/25/2018 04:30 AM  
 Alk. phosphatase 131 09/25/2018 04:30 AM  
 Amylase 76 11/22/2016 11:14 AM  
 Lipase 114 09/24/2018 01:09 PM  
 
Lab Results Component Value Date/Time ALT (SGPT) 16 09/25/2018 04:30 AM  
 
CT OF THE ABDOMEN AND PELVIS WITH CONTRAST: 10/27/2017 CLINICAL INDICATION: Abdominal pain, follow-up diverticular abscess in the left 
lower abdomen. PROCEDURE: Serial thin section axial images obtained from the lung bases through 
the proximal femurs following the administration of 100 cc of Isovue 370 
intravenous contrast.  Radiation dose reduction techniques were used for this 
study. Our CT scanners use one or all of the following: Automated exposure 
control, adjusted of the mA and/or kV according to patient size, iterative 
reconstruction COMPARISON: CT of the abdomen and pelvis dated 10/16/2017 and 9/15/2017 FINDINGS:  
CT ABDOMEN: There is a slight decrease in the size of the extraluminal air in 
the left lower abdominal quadrant that now measures only 11 mm. This is likely 
residual abscess. Note there is no discernible fluid within the collection. Patient is status post cholecystectomy with mild intra and extra hepatic bile 
duct dilation. No obvious stones. The pancreas, liver, spleen, and bilateral 
kidneys are unremarkable. The adrenal glands are normal. No additional or new 
bowel inflammation. CT PELVIS: There is extensive sigmoid colon diverticulosis. The bladder is well-distended with smooth thin walls. A uterine fibroid is present. No free 
fluid acutely dependently in the pelvis. No aggressive osseous lesions identified. 
  
IMPRESSION: 
1. Minimal residual inflammation in the left lower abdominal quadrant with a 
small presumably extraluminal collection of air that now measures 11 mm in 
keeping with resolving abscess. Note this could also be a prominent diverticula. 2. Uterine fibroid 3. Sigmoid colon diverticulosis. 4. Mild intra and extrahepatic bile duct dilation. Image from 10/16/2017 CT ABDOMEN AND PELVIS WITH CONTRAST: 12/18/2017 HISTORY: abd pain, hx of diverticular dz. wt loss, nausea/vomiting, 67 years Female ?c/o abdominal pain- states she was recently dx with diverticulitis. Pt 
states nausea. Pt also c/o blood in urine and generalized body pain BMI 17.4 COMPARISON: CT abdomen October 27, 2017 TECHNIQUE: Oral contrast was not administered. 100 cc of nonionic intravenous 
contrast was injected, and axial helical CT images were obtained from above the 
diaphragm through the pelvis. Coronal reformatted images were obtained at the 
scanner console and made available for review. Radiation dose reduction 
techniques were used for this study:  Our CT scanners use one or all of the 
following: Automated exposure control, adjustment of the mA and/or kVp according 
to patient's size, iterative reconstruction. FINDINGS: 
ABDOMEN: 
Minimal dependent subsegmental atelectasis bilateral lung bases. Evidence of 
cholecystectomy. Persistent diffuse hepatic steatosis. Normal-appearing 
spleen, pancreas, bilateral adrenal glands. Small simple appearing right renal 
interpolar region cortical cyst unchanged. Subcentimeter left renal cortical 
hypoenhancing lesions also unchanged, too small to characterize. Left renal 
upper pole cortical scarring unchanged. Mild calcific atherosclerosis of a 
normal caliber abdominal aorta.   No evidence of significant lymphadenopathy. Normal-appearing small bowel. No evidence of intraperitoneal free air or free 
fluid. PELVIS: 
Normal-appearing urinary bladder. Ovoid mass in the anterior uterine myometrium 
measuring up to 2.5 cm appears unchanged, may represent a fibroid. Normal-appearing bilateral adnexa. Large stool ball in the rectum. Stool is 
seen throughout the colon. There is no definite evidence of residual 
diverticular abscess. Persistent sigmoid diverticulosis. No evidence of pelvic 
free fluid. No evidence of significant inguinal or pelvic sidewall 
lymphadenopathy. Visualized osseous structures unremarkable. 
  
IMPRESSION:  
1. No acute pathology identified. No evidence of residual diverticular 
abscess. 2.  Other chronic findings as above. 
  
 
 
 
CT OF THE ABDOMEN AND PELVIS WITH CONTRAST: 8/7/2018 CLINICAL INDICATION: Moderate diffuse abdominal pain for three months, 
diverticulitis PROCEDURE: Serial thin section axial images obtained from the lung bases through 
the proximal femurs following the administration of 100 cc of Isovue 370 
intravenous contrast.  Radiation dose reduction techniques were used for this 
study. Our CT scanners use one or all of the following: Automated exposure 
control, adjusted of the mA and/or kV according to patient size, iterative 
reconstruction COMPARISON: CT of the abdomen and pelvis dated 5/31/20181 FINDINGS:  
The lung bases are clear. CT ABDOMEN: The liver, spleen, pancreas, and kidneys are unremarkable. The 
gallbladder is been resected. The adrenal glands are normal. There is no ascites 
or adenopathy. There is a long segment of circumferential wall thickening 
involving the ascending colon with mild inflammation in the pericolonic fat in 
keeping with a colitis. There is more subtle involvement of the transverse colon 
as well. No bowel obstruction or perforation evident. No free air identified. The small bowel is unremarkable. CT PELVIS: The bladder is normal. The rectum and sigmoid colon show diverticular 
change without acute diverticulitis. The uterus and adnexa are unremarkable. No 
free fluid accumulating dependently in the pelvis. No aggressive osseous lesions identified. 
  
IMPRESSION: 
1. Long segment colitis involving the ascending colon and proximal portion of 
the transverse colon. 2. Sigmoid colon diverticulosis. CTAP 9/24/18 IMPRESSION: 
  
Surgical changes status post partial sigmoidectomy and primary reanastomosis. There is no evidence of a fluid collection at the level of the anastomosis 
within the limits of noncontrast CT. There is no evidence of bowel perforation 
at the anastomosis or anywhere in the abdomen. 
  
There is subtly worsened stranding of the mesenteric fat adjacent to multiple 
diverticula involving the ascending colon. While some of the stranding could be 
related to postoperative fluid in the paracolic gutter, a diverticulitis is also 
possible. Clinical correlation for associated symptoms is recommended. 
  
There are a few mildly dilated small bowel loops in the midabdomen, without 
evidence of a bowel obstruction. There is no pneumatosis, wall thickening, or 
bowel perforation. This is nonspecific and could be related to enteritis or mild 
postoperative ileus. Assessment/Plan:     Apolinar Maciel is a 76 y.o. female who has history of complicated diverticulitis with peridiverticular abscess. She underwent surgery 9/5 for a sigmoid colectomy with drainage of abscess. She had been doing well until recently when she developed abdominal pain. Problem List  Date Reviewed: 9/5/2018 Codes Class Noted * (Principal)Leukocytosis ICD-10-CM: Q14.646 ICD-9-CM: 288.60  9/24/2018 Post-operative infection ICD-10-CM: T81. 4XXA ICD-9-CM: 998.59  9/24/2018 Abdominal pain ICD-10-CM: R10.9 ICD-9-CM: 789.00  9/24/2018  Diverticulitis ICD-10-CM: L45.13 
 ICD-9-CM: 562.11  9/24/2018 Diverticulitis of large intestine with abscess ICD-10-CM: K57.20 ICD-9-CM: 562.11, 569.5  9/5/2018 Type 2 diabetes mellitus with nephropathy ([facility] [address] ICD-10-CM: E11.21 
ICD-9-CM: 250.40, 583.81  1/22/2018 Diverticular disease of intestine with perforation and abscess ICD-10-CM: K57.80 ICD-9-CM: 562.11  11/17/2017 Constipation ICD-10-CM: K59.00 ICD-9-CM: 564.00  11/17/2017 Nausea and vomiting ICD-10-CM: R11.2 ICD-9-CM: 787.01  11/7/2016 Stomach ache ICD-10-CM: R10.9 ICD-9-CM: 536.8  11/7/2016 RBD (REM behavioral disorder) ICD-10-CM: G47.52 
ICD-9-CM: 327.42  9/29/2016 Neurologic orthostatic hypotension (HCC) ICD-10-CM: G90.3 ICD-9-CM: 333.0  9/6/2016 Anxiety and depression ICD-10-CM: F41.9, F32.9 ICD-9-CM: 300.00, 311  9/6/2016 Loss of appetite ICD-10-CM: R63.0 ICD-9-CM: 783.0  9/6/2016 Psychosomatic factor in physical condition ICD-10-CM: F54 
ICD-9-CM: 912  9/6/2016 Neuropathy associated with endocrine disorder (HCC) ICD-10-CM: E34.9, G63 ICD-9-CM: 355.9, 259.9  7/21/2016 Parkinson disease [facility]) ICD-10-CM: G20 
ICD-9-CM: 332.0  7/21/2016 Tremor ICD-10-CM: R25.1 ICD-9-CM: 781.0  7/21/2016 Nontoxic multinodular goiter ICD-10-CM: E04.2 ICD-9-CM: 241.1  7/5/2016 Abnormal gait ICD-10-CM: R26.9 ICD-9-CM: 781.2  7/5/2016 Chronic abdominal pain ICD-10-CM: R10.9, G89.29 ICD-9-CM: 789.00, 338.29  7/5/2016 GERD (gastroesophageal reflux disease) ICD-10-CM: K21.9 ICD-9-CM: 530.81  7/5/2016 Anxiety ICD-10-CM: F41.9 ICD-9-CM: 300.00  7/5/2016 Type 2 diabetes mellitus without complication [facility]-88-DF: G03.0 ICD-9-CM: 250.00  7/5/2016 Bilateral carpal tunnel syndrome ICD-10-CM: G56.03 
ICD-9-CM: 354.0  7/5/2016 Urinary frequency ICD-10-CM: R35.0 ICD-9-CM: 788.41  7/5/2016 Neuropathic pain ICD-10-CM: M79.2 ICD-9-CM: 729.2  7/5/2016 Hypertension ICD-10-CM: I10 
ICD-9-CM: 401.9  Unknown Parkinson's disease ([facility] ICD-10-CM: G20 
ICD-9-CM: 332.0  Unknown Peripheral neuropathy ICD-10-CM: G62.9 ICD-9-CM: 356.9  Unknown Syncope ICD-10-CM: R55 
ICD-9-CM: 780.2  10/10/2015 Parkinsonism ([facility] ICD-10-CM: G20 
ICD-9-CM: 332.0  8/10/2015 Continue IV Rocephin q24 IVF Pepcid/SCD Serial exams Monitor WBC PTA meds restarted Start CLD Likely return to rehab tomorrow Yadira Carlos NP I have seen and examined the patient with the Nurse Practitioner and agree with the above assessment and plan. Ana Diaz.  Liam Milligan MD

## 2018-09-26 NOTE — PROGRESS NOTES
Dispo update:  Per Ms. Cody Wilkinson NP, plan is for Ms. Trujillo of room 216 to return to [facility] for STR. Updated Ms. Maryellen Soares, RN liaison for L[facility] Inman (includes Viviana Martinez).

## 2018-09-27 VITALS
BODY MASS INDEX: 17.58 KG/M2 | TEMPERATURE: 97.7 F | OXYGEN SATURATION: 99 % | SYSTOLIC BLOOD PRESSURE: 158 MMHG | RESPIRATION RATE: 16 BRPM | HEIGHT: 67 IN | WEIGHT: 112 LBS | HEART RATE: 76 BPM | DIASTOLIC BLOOD PRESSURE: 73 MMHG

## 2018-09-27 LAB
ERYTHROCYTE [DISTWIDTH] IN BLOOD BY AUTOMATED COUNT: 15.9 %
HCT VFR BLD AUTO: 31.6 % (ref 35.8–46.3)
HGB BLD-MCNC: 10 G/DL (ref 11.7–15.4)
MCH RBC QN AUTO: 25.6 PG (ref 26.1–32.9)
MCHC RBC AUTO-ENTMCNC: 31.6 G/DL (ref 31.4–35)
MCV RBC AUTO: 80.8 FL (ref 79.6–97.8)
NRBC # BLD: 0 K/UL (ref 0–0.2)
PLATELET # BLD AUTO: 325 K/UL (ref 150–450)
PMV BLD AUTO: 10.4 FL (ref 9.4–12.3)
RBC # BLD AUTO: 3.91 M/UL (ref 4.05–5.2)
WBC # BLD AUTO: 5.6 K/UL (ref 4.3–11.1)

## 2018-09-27 PROCEDURE — 74011000250 HC RX REV CODE- 250: Performed by: NURSE PRACTITIONER

## 2018-09-27 PROCEDURE — 97161 PT EVAL LOW COMPLEX 20 MIN: CPT

## 2018-09-27 PROCEDURE — 97530 THERAPEUTIC ACTIVITIES: CPT

## 2018-09-27 PROCEDURE — 74011250636 HC RX REV CODE- 250/636: Performed by: NURSE PRACTITIONER

## 2018-09-27 PROCEDURE — 74011250637 HC RX REV CODE- 250/637: Performed by: NURSE PRACTITIONER

## 2018-09-27 PROCEDURE — 97165 OT EVAL LOW COMPLEX 30 MIN: CPT

## 2018-09-27 PROCEDURE — 85027 COMPLETE CBC AUTOMATED: CPT

## 2018-09-27 PROCEDURE — 97110 THERAPEUTIC EXERCISES: CPT

## 2018-09-27 PROCEDURE — 36415 COLL VENOUS BLD VENIPUNCTURE: CPT

## 2018-09-27 RX ORDER — SULFAMETHOXAZOLE AND TRIMETHOPRIM 800; 160 MG/1; MG/1
1 TABLET ORAL 2 TIMES DAILY
Qty: 14 TAB | Refills: 0 | Status: SHIPPED
Start: 2018-09-27 | End: 2019-02-01 | Stop reason: ALTCHOICE

## 2018-09-27 RX ORDER — HYDROCODONE BITARTRATE AND ACETAMINOPHEN 5; 325 MG/1; MG/1
1 TABLET ORAL
Qty: 20 TAB | Refills: 0 | Status: SHIPPED | OUTPATIENT
Start: 2018-09-27 | End: 2018-10-02

## 2018-09-27 RX ORDER — HYDROCODONE BITARTRATE AND ACETAMINOPHEN 5; 325 MG/1; MG/1
1 TABLET ORAL
Status: DISCONTINUED | OUTPATIENT
Start: 2018-09-27 | End: 2018-09-27 | Stop reason: HOSPADM

## 2018-09-27 RX ADMIN — PREGABALIN 50 MG: 50 CAPSULE ORAL at 09:17

## 2018-09-27 RX ADMIN — MIDODRINE HYDROCHLORIDE 2.5 MG: 5 TABLET ORAL at 09:17

## 2018-09-27 RX ADMIN — CARBIDOPA AND LEVODOPA 2 TABLET: 25; 100 TABLET, ORALLY DISINTEGRATING ORAL at 15:52

## 2018-09-27 RX ADMIN — FAMOTIDINE 20 MG: 10 INJECTION, SOLUTION INTRAVENOUS at 09:16

## 2018-09-27 RX ADMIN — Medication 10 ML: at 05:17

## 2018-09-27 RX ADMIN — ALPRAZOLAM 0.5 MG: 0.5 TABLET ORAL at 15:51

## 2018-09-27 RX ADMIN — Medication 5 ML: at 14:07

## 2018-09-27 RX ADMIN — METHIMAZOLE 5 MG: 5 TABLET ORAL at 09:17

## 2018-09-27 RX ADMIN — CARBIDOPA AND LEVODOPA 2 TABLET: 25; 100 TABLET, ORALLY DISINTEGRATING ORAL at 14:06

## 2018-09-27 RX ADMIN — CARBIDOPA AND LEVODOPA 2 TABLET: 25; 100 TABLET, ORALLY DISINTEGRATING ORAL at 07:43

## 2018-09-27 RX ADMIN — HYDROCODONE BITARTRATE AND ACETAMINOPHEN 1 TABLET: 5; 325 TABLET ORAL at 09:18

## 2018-09-27 RX ADMIN — CARBIDOPA AND LEVODOPA 2 TABLET: 25; 100 TABLET, ORALLY DISINTEGRATING ORAL at 05:14

## 2018-09-27 RX ADMIN — CARBIDOPA AND LEVODOPA 2 TABLET: 25; 100 TABLET, ORALLY DISINTEGRATING ORAL at 10:56

## 2018-09-27 NOTE — PROGRESS NOTES
Problem: Falls - Risk of 
Goal: *Absence of Falls Document Ezra Verdugo Fall Risk and appropriate interventions in the flowsheet. Outcome: Progressing Towards Goal 
Fall Risk Interventions: 
Mobility Interventions: Communicate number of staff needed for ambulation/transfer, Patient to call before getting OOB Medication Interventions: Patient to call before getting OOB, Teach patient to arise slowly Elimination Interventions: Call light in reach, Patient to call for help with toileting needs, Toileting schedule/hourly rounds History of Falls Interventions: Consult care management for discharge planning, Evaluate medications/consider consulting pharmacy Problem: Pressure Injury - Risk of 
Goal: *Prevention of pressure injury Document Franklyn Scale and appropriate interventions in the flowsheet. Outcome: Progressing Towards Goal 
Pressure Injury Interventions: 
  
 
Moisture Interventions: Maintain skin hydration (lotion/cream), Moisture barrier, Minimize layers Activity Interventions: Pressure redistribution bed/mattress(bed type) Mobility Interventions: Pressure redistribution bed/mattress (bed type) Nutrition Interventions: Document food/fluid/supplement intake

## 2018-09-27 NOTE — PROGRESS NOTES
Discharged to [facility], transported via stretcher accompanied by Amos Fernandez. Condition stable at discharge. Packet of forms and patient's belongings sent with transport staff

## 2018-09-27 NOTE — DISCHARGE SUMMARY
[facility] [address]  [place], [address] [facility]  [phone]   Discharge Summary     Theta Skiff  MRN: [id number]     : 1950     Age: 76 y.o. Admit date: 2018     Discharge date: 2018  Attending Physician: Zeb Ray MD  Primary Discharge Diagnosis:   Principal Problem:    Leukocytosis (2018)    Active Problems:    Diverticulitis of large intestine with abscess (2018)      Post-operative infection (2018)      Abdominal pain (2018)      Diverticulitis (2018)      Primary Operations or Procedures Performed :  * No surgery found *     Brief History and Reason for Admission: Theta Skiff was admitted with the following history of present illness. Theta Skiff is a 76 y.o. female who is s/p open sigmoid colectomy with EEA anastomosis and drainage of localized abscess 18. Her hospitalization was uneventful. Bowel function returned on POD 2 and she was started on a clear liquid diet. Her diet was advanced slowly and she was able to tolerate a soft diet at discharge. OR cultures grew e.coli and she was treated with Rocephin inpatient and sent to rehab with [facility] for 7 days. She reported to the ER today with complaints of abdominal pain \"all over\". ER work up was impressive for a WBC of 28,100. She is AF with VSS. A CT scan was obtained that is concerning for diverticulitis. Her abdomen is benign. She denies fever or chills. She has been tolerating a soft diet. She is moving her bowels.      Past surgical history: She was seen in our office following EGD/colonoscopy by Dr. Rupali Ahumada in early August and a follow-up CT scan performed on . I reviewed those reports and images from the CT scan. She has continued to have recurrent bouts of abdominal pain throughout this time. She has had multiple visits to the emergency room. She has been placed on antibiotics multiple times.   It is apparent that she has chronic recurrent sigmoid diverticulitis. There may also be other things going on with her colon. The CT did show some colitis in the ascending colon. Her complaints are primarily left lower quadrant. She does have some generalized complaints as well. She was placed on Megace which seems to be increasing her appetite. She was present with a caregiver today who says that she will actually eat through the night. She was also placed on mesalamine but she is having difficulty swallowing that pill. She is currently afebrile. She reports having a bowel movement once every 2 weeks. The caregiver corrected her since said that she is actually having fairly regular bowel movements almost daily this week. She is taking MiraLAX. She is still complaining of hard stools and may need to adjust that dosing.     I had seen her in January 2018 in the office for 4 week follow-up of her complicated diverticulitis. I also saw her in December 2017. A few days after that visit she went to the emergency department for continued complaints of abdominal pain. A follow-up CT scan was performed which shows complete resolution of her diverticulitis and abscess cavity. Her all of her blood work was normal.  She was supposed to have an appointment with Dr. Smita Greer last week but this was canceled due to the snowy conditions. She will set up a repeat appointment with him. She still has many aches and pains. These appear to be chronic. I reviewed her new CT scan and attached the corresponding image from prior image with abscess. This shows resolution and only chronic other findings.     On last visit in December 2017, she comes in not feeling well. She complains of loss of appetite, nausea, abdominal and chest pain. She was seen by Dr. Smita Greer within the past 2 days. I was able to speak with him as well. He ordered blood work which was all without abnormality.   He did order a repeat CT scan which will be performed next week. If that scan has remained normalized then he will proceed with colonoscopy. She denies fevers but she does report some chills. She says she has lost additional weight. Her weight today is 111 pounds and was 114 pounds 4 weeks ago. She claims her normal weight is 135 pounds. When I saw her frequent 4 weeks ago she had just started a 30 day course of Cipro and Flagyl. She claims that she started developing some rash and swelling 2 days ago and stopped the antibiotics at that time but should have nearly completed that course.     She was referred by [facility] for evaluation of diverticulitis with peridiverticular abscess. Patient is very pleasant woman who presents with a caregiver who she uses due to Parkinson's disease. She moved from [place] in 2015. She was diagnosed with acute diverticulitis with peridiverticular abscess on September 15, 2017. This was at [facility] and she was managed with antibiotics. The originally planned to place a percutaneous drain but that was not required. I do not have access to that CT scan. She had follow-up CT scans one month later on October 16 and then again more recently on October 27. Both of those studies showed continued improvement of the size of the peridiverticular abscess which originally was close to 3 cm then decreased to 1.6 cm and now on the recent study is 1.1 cm and may actually represent a large diverticulum or a resolving abscess. There is very little evidence of residual inflammation. She continues to have some abdominal symptoms but she is not having any fevers or severe abdominal pain. She has chronic constipation and does take a dose of MiraLAX per day. She still has very hard balls for stool. She has not seen any ribbon stools because of these ball-like stools. She had been on Augmentin prior to recent visit with Dr. Azra Lambert.   He saw her 2 days ago on November 15 and change her antibiotics from Augmentin to Cipro and Flagyl. He placed her on a 30 day regimen. In further questioning she is taking medications with her MiraLAX. I discussed with her that this may actually affect the absorption of any of her medications as the water bound to MiraLAX is a reversible and must exit the body via the anus. This will frequently capture medications as well and that practice of taking any medications within one half hour of MiraLAX is discouraged. She has one prior episode in her history of diverticulitis. This was in 2015 weeks she was still living in [place]. Similarly she had a peridiverticular abscess that did not require drainage and was treated with antibiotics. Her last colonoscopy was in 2014 or 2015 prior to her episode of diverticulitis. She was noted to have diverticulosis at that time according to her. She has had upper endoscopies type Dr. Luigi Choi recently. These were unremarkable. I have believe he is planning on doing a colonoscopy once there is resolution of her diverticulitis. My usual recommendation is no sooner than 6 weeks to 3 months following resolution. This is done sooner if there is a suspicion of malignancy. Her abdominal surgical history is fairly insignificant. She has had a laparoscopic cholecystectomy as well as bilateral tubal ligation. She has not had any colon procedures.     She has noted allergies to oral and IV iodinated contrast as well as metronidazole. All of these have been used recently or currently and seem to be without issue. Pathology was reviewed while inpatient.       Pathology        DIAGNOSIS   SIGMOID COLON: DIVERTICULOSIS WITH DIVERTICULITIS.   sms/9/6/2018   Electronically signed out on 9/6/2018 13:09 by Denise Sahu. Maura Gray MD            Hospital Course: The patient was admitted with leukocytosis. CT scan concerning for diverticulitis though abdomen benign. She responded to antibiotics. WBC at discharge were 5,000.  She was discharged back to [facility] in good condition on 7 days of Bactrim. Condition at Discharge: Good    Discharge Medications:   Current Discharge Medication List      START taking these medications    Details   trimethoprim-sulfamethoxazole (BACTRIM DS, SEPTRA DS) 160-800 mg per tablet Take 1 Tab by mouth two (2) times a day. Qty: 14 Tab, Refills: 0         CONTINUE these medications which have NOT CHANGED    Details   carbidopa-levodopa (PARCOPA)  mg rapid dissolve tablet Take 2 Tabs by mouth Before meals, after meals and at bedtime. Indications: IDIOPATHIC PARKINSONISM      ALPRAZolam (XANAX) 0.5 mg tablet Take 1 Tab by mouth three (3) times daily as needed for Anxiety. Max Daily Amount: 1.5 mg.  Qty: 14 Tab, Refills: 0    Associated Diagnoses: Anxiety and depression      pregabalin (LYRICA) 50 mg capsule Take 1 Cap by mouth two (2) times a day. Max Daily Amount: 100 mg. Indications: Diabetic Peripheral Neuropathy  Qty: 15 Cap, Refills: 0    Associated Diagnoses: Primary Parkinsonism (HCC)      famotidine (PEPCID) 40 mg tablet Take 40 mg by mouth nightly. midodrine (PROAMITINE) 2.5 mg tablet Take  by mouth daily. Indications: Symptomatic Orthostatic Hypotension      ergocalciferol (VITAMIN D2) 50,000 unit capsule Take 50,000 Units by mouth. MEGESTROL ACETATE (MEGACE PO) Take  by mouth. methIMAzole (TAPAZOLE) 10 mg tablet Take 10 mg by mouth daily. Refills: 0      esomeprazole (NEXIUM) 40 mg capsule Take 40 mg by mouth two (2) times a day. DELZICOL 400 mg cdti capsule Take 400 mg by mouth three (3) times daily. Refills: 0      neomycin (MYCIFRADIN) 500 mg tablet Take 1 Tab by mouth three (3) times daily. Take at 1 PM, 2 PM and 8 PM day prior to surgery  Qty: 3 Tab, Refills: 0      loratadine (CLARITIN) 10 mg tablet Take 10 mg by mouth. docusate sodium (COLACE) 50 mg capsule Take 50 mg by mouth two (2) times a day.       ondansetron hcl (ZOFRAN) 4 mg tablet Take 1 Tab by mouth three (3) times daily as needed for Nausea. Qty: 9 Tab, Refills: 0      polyethylene glycol (MIRALAX) 17 gram packet Take 17 g by mouth as needed. Indications: Constipation               Disposition: [facility]    Discharge Instructions/Follow-up Care:      MD Instructions:     Follow-up with Dr. Liam Milligan in 3 weeks. Keep incisions clean and dry, may remain uncovered. Do not apply lotions, creams or ointments to incisions.     Diet - GI soft  Activity - ambulate - as tolerated - no heavy lifting >10lb. May shower - no tub baths or soaking/submerging.     No driving while taking narcotics. Do not drink alcohol while taking narcotics. Resume other home medications.      If problems or questions arise, please call our office at [phone].     Greater than 30 minutes were spent discharging the patient    Signed:  Yadira Carlos NP   9/27/2018  8:12 AM    I have seen and examined the patient with the Nurse Practitioner and agree with the above assessment and plan. Ana Diaz.  Liam Milligan MD

## 2018-09-27 NOTE — DISCHARGE INSTRUCTIONS
Discharge Instructions/Follow-up Plans:   MD Instructions:     Follow-up with Dr. Lianne Santos in 5 weeks. Keep incisions clean and dry, may remain uncovered. Do not apply lotions, creams or ointments to incisions.     Diet - GI soft  Activity - ambulate - as tolerated - no heavy lifting >10lb. May shower - no tub baths or soaking/submerging.     No driving while taking narcotics. Do not drink alcohol while taking narcotics.   Resume other home medications.      If problems or questions arise, please call our office at [phone].     Greater than 30 minutes were spent discharging the patient

## 2018-09-27 NOTE — PROGRESS NOTES
Problem: Mobility Impaired (Adult and Pediatric) Goal: *Acute Goals and Plan of Care (Insert Text) LTG: 
(1.)Ms. Chapito Palacio will transfer from bed to chair and chair to bed with INDEPENDENT using the least restrictive/no device within 7 day(s). (2.)Ms. Chapito Palacio will ambulate with modified INDEPENDENCE for 500+ feet with increased step length with the least restrictive/no device within 7 day(s). (3.)Ms. Chapito Palacio will perform standing static and dynamic balance activities x 8 minutes with SUPERVISION to improve safety within 7 day(s). (4.)Ms. Chapito Palacio will ascend and descend 4 stairs using one hand rail(s) with SUPERVISION to improve functional mobility and safety within 7 day(s). PHYSICAL THERAPY: Initial Assessment, Treatment Day: Day of Assessment, AM 9/27/2018 INPATIENT: Hospital Day: 4 [place] / Product Type: Medicare /  
  
NAME/AGE/GENDER: Cesar Roque is a 76 y.o. female PRIMARY DIAGNOSIS: Leukocytosis Post-operative infection Leukocytosis Leukocytosis ICD-10: Treatment Diagnosis:  
 · Other abnormalities of gait and mobility (R26.89) · Repeated Falls (R29.6) Precaution/Allergies: 
Flagyl [metronidazole]; Aspirin; Contrast agent [iodine]; Gabapentin; Gluten; Milk containing products; Morphine; Omnipaque rediflo [iohexol]; and Red dye ASSESSMENT:  
 
Ms. Chapito Palacio presents standing at bedside. She underwent surgery recently and has steri strips on abdomen. She requires supervision with bed mobility and transfers. Patient ambulated 125' with RW with short, shuffling steps and flexed posture. Patient has declined in functional mobiltiy with recent surgery. She also has history of falls and lives alone, so needs to be independent and safe prior to returning home. Ms. Chapito Palacio would benefit from skilled physical therapy (medically necessary) to address her deficits and maximize her function.   She may benefit from outpatient program for PD after her wound heals, if she has transportation. Initiated treatment to include ambulation with cueing to increase step length. Gait pattern improved with cueing. Worked on exercises in room with cueing to increase amplitude and speed of movement. Attempted work on trunk rotation, but this was uncomfortable due to surgery. Will continue to work towards goals. This section established at most recent assessment PROBLEM LIST (Impairments causing functional limitations): 1. Decreased Transfer Abilities 2. Decreased Ambulation Ability/Technique 3. Decreased Balance 4. Decreased Activity Tolerance INTERVENTIONS PLANNED: (Benefits and precautions of physical therapy have been discussed with the patient.) 1. Bed Mobility 2. Gait Training 3. Home Exercise Program (HEP) 4. Therapeutic Activites 5. Therapeutic Exercise/Strengthening 6. Transfer Training 7. education 8. Group Therapy TREATMENT PLAN: Frequency/Duration: 3 times a week for duration of hospital stay Rehabilitation Potential For Stated Goals: Excellent RECOMMENDED REHABILITATION/EQUIPMENT: (at time of discharge pending progress): Due to the probability of continued deficits (see above) this patient will likely need continued skilled physical therapy after discharge. Equipment:  
? None at this time HISTORY:  
History of Present Injury/Illness (Reason for Referral): 
Per MD note, \"Essence Gao is a 76 y.o. female who is s/p open sigmoid colectomy with EEA anastomosis and drainage of localized abscess 9/5/18. Her hospitalization was uneventful. Bowel function returned on POD 2 and she was started on a clear liquid diet. Her diet was advanced slowly and she was able to tolerate a soft diet at discharge. OR cultures grew e.coli and she was treated with Rocephin inpatient and sent to rehab with [facility] for 7 days.  She reported to the ER today with complaints of abdominal pain \"all over\". ER work up was impressive for a WBC of 28,100. She is AF with VSS. A CT scan was obtained that is concerning for diverticulitis. Her abdomen is benign. She denies fever or chills. She has been tolerating a soft diet. She is moving her bowels. \" 
Past Medical History/Comorbidities: Ms. Marcus Yusuf  has a past medical history of Abnormal gait (7/5/2016); Acute serous otitis media of left ear; Anxiety; Bilateral carpal tunnel syndrome (7/5/2016); Chronic abdominal pain (7/5/2016); Dementia due to Parkinson's disease without behavioral disturbance ([address] (7/5/2016); Depression; Diverticulitis; Gastritis; GERD (gastroesophageal reflux disease); Heart murmur; Hypercholesterolemia; Hypertension; Neuropathic pain (7/5/2016); Nontoxic multinodular goiter (7/5/2016); Orthostatic hypotension; Parkinson's disease ([facility] [address] Peripheral neuropathy; Syncope (10/10/2015); Type 2 diabetes mellitus without complication (0/0/7087); Urinary frequency (7/5/2016); Vaginal bleeding; and Weight loss, unintentional. She also has no past medical history of Difficult intubation; Malignant hyperthermia due to anesthesia; or Pseudocholinesterase deficiency. Ms. Marcus Yusuf  has a past surgical history that includes hx cholecystectomy (2001); hx tubal ligation (1974); hx appendectomy (1974); and colonoscopy (N/A, 8/6/2018). Social History/Living Environment:  
Home Environment: Private residence # Steps to Enter: 0 One/Two Story Residence: One story Living Alone: Yes Support Systems: Family member(s), Friends \ neighbors Patient Expects to be Discharged to[de-identified] Other (comment) (patient not sure between rehab and home ) Current DME Used/Available at Home: [employer], straight, Wheelchair Prior Level of Function/Work/Activity: 
Lives at home alone. Has caregiver 3 hours a day. Ambulates with RW, but has fallen 3x in past 6 months.  
   
Number of Personal Factors/Comorbidities that affect the Plan of Care: 3+: HIGH COMPLEXITY EXAMINATION:  
Most Recent Physical Functioning:  
Gross Assessment: 
AROM: Generally decreased, functional 
Strength: Within functional limits Coordination: Generally decreased, functional 
Sensation: Impaired Posture: 
Posture (WDL): Exceptions to [facility] Posture Assessment: Forward head, Rounded shoulders Balance: 
Sitting: Intact Standing: Impaired Standing - Static: Good Standing - Dynamic : Fair Bed Mobility: 
Rolling: Supervision Supine to Sit: Supervision Sit to Supine: Supervision Wheelchair Mobility: 
  
Transfers: 
Sit to Stand: Supervision Stand to Sit: Supervision Gait: 
  
Base of Support: Narrowed Speed/Angela: Shuffled Step Length: Right shortened;Left shortened Gait Abnormalities: Shuffling gait Distance (ft): 125 Feet (ft) (x2) Assistive Device: Walker, rolling Ambulation - Level of Assistance: Contact guard assistance Body Structures Involved: 1. Digestive Structures Body Functions Affected: 1. Sensory/Pain 2. Neuromusculoskeletal 
3. Movement Related 4. Skin Related 5. Digestive Activities and Participation Affected: 1. Mobility 2. Self Care 3. Domestic Life Number of elements that affect the Plan of Care: 4+: HIGH COMPLEXITY CLINICAL PRESENTATION:  
Presentation: Stable and uncomplicated: LOW COMPLEXITY CLINICAL DECISION MAKING:  
[facility] MIRAGE -PAC 6 Clicks Basic Mobility Inpatient Short Form How much difficulty does the patient currently have. .. Unable A Lot A Little None 1. Turning over in bed (including adjusting bedclothes, sheets and blankets)? [] 1   [] 2   [x] 3   [] 4  
2. Sitting down on and standing up from a chair with arms ( e.g., wheelchair, bedside commode, etc.)   [] 1   [] 2   [x] 3   [] 4  
3. Moving from lying on back to sitting on the side of the bed? [] 1   [] 2   [x] 3   [] 4 How much help from another person does the patient currently need. .. Total A Lot A Little None 4. Moving to and from a bed to a chair (including a wheelchair)? [] 1   [] 2   [x] 3   [] 4  
5. Need to walk in hospital room? [] 1   [] 2   [x] 3   [] 4  
6. Climbing 3-5 steps with a railing? [] 1   [] 2   [x] 3   [] 4  
© 2007, Trustees of [address] [place] [address] [address] [address], under license to [employer]. All rights reserved Score:  Initial: 18 Most Recent: X (Date: -- ) Interpretation of Tool:  Represents activities that are increasingly more difficult (i.e. Bed mobility, Transfers, Gait). Score 24 23 22-20 19-15 14-10 9-7 6 Modifier CH CI CJ CK CL CM CN   
 
? Mobility - Walking and Moving Around:  
  - CURRENT STATUS: CK - 40%-59% impaired, limited or restricted  - GOAL STATUS: CJ - 20%-39% impaired, limited or restricted  - D/C STATUS:  ---------------To be determined--------------- [place] / [place] B / Product Type: Medicare /   
 
Medical Necessity:    
· Patient is expected to demonstrate progress in balance and functional technique to increase independence with   and improve safety during transfers/gait. Reason for Services/Other Comments: 
· Patient continues to require skilled intervention due to decline in functional mobility, recent falls. Use of outcome tool(s) and clinical judgement create a POC that gives a: Clear prediction of patient's progress: LOW COMPLEXITY  
  
 
 
 
TREATMENT:  
(In addition to Assessment/Re-Assessment sessions the following treatments were rendered) Pre-treatment Symptoms/Complaints:   
Pain: Initial:  
Pain Intensity 1: 10 
Pain Location 1: Leg 
Pain Orientation 1: Left, Right  Post Session:  Unchanged. Therapeutic Exercise: (  15 minutes):  Exercises per grid below to improve mobility and coordination. Required moderate visual, verbal and manual cues to promote proper body alignment and promote proper speed and execution of exercises. .  Progressed complexity of movement as indicated. DATE: 9/27/18 Ambulation x125' RW 
CGA Hip Flexion x15 AB Long Arc [address] Knee Squeezes Ankle DF/PF Trunk rotation in sit x5 AB Hip ab/ad x15 AAB Key:  A=active, AA=active assisted, P=passive, B=bilaterally, R=right, L=left DF=dorsiflexion, PF=plantarflexion Braces/Orthotics/Lines/Etc:  
· O2 Device: Room air Treatment/Session Assessment:   
· Response to Treatment:  Pleasant and cooperative. · Interdisciplinary Collaboration:  
o Physical Therapist 
o Occupational Therapist 
o Registered Nurse · After treatment position/precautions:  
o Up in chair 
o Bed/Chair-wheels locked 
o Call light within reach 
o RN notified · Compliance with Program/Exercises: Will assess as treatment progresses. · Recommendations/Intent for next treatment session: \"Next visit will focus on advancements to more challenging activities and reduction in assistance provided\". Total Treatment Duration: PT Patient Time In/Time Out Time In: 1043 Time Out: 1115 Kiersten Gracia, PT, DPT

## 2018-09-27 NOTE — PROGRESS NOTES
Hourly rounds completed throughout this shift. Pt c/o nausea and vomited x 2 this shift, Zofran given per MAR. Pt had 1 soft BM this shift. Pt resting in bed; denies needs at this time. Will continue to monitor and report to oncoming night shift nurse.

## 2018-09-27 NOTE — PROGRESS NOTES
CM met with patient to discuss d/c plan. Patient alert and oriented. Patient states she was at [facility] and stated \"they were mean to me\". Patiwient states she's not interested in returning back to [facility]. States that they were not giving her, her parkinson medication. CM made contact with Nelson Dumont and she said she have a bed available at [facility] and patient is interested in going to [facility]. CM will continue to follow. Patient will be transport to [address] [place] [address] by [facility]. Patient was provided room # 102P. Care Management Interventions PCP Verified by CM: Yes (Dr. Edgar Arita) Mode of Transport at Discharge: Other (see comment) [employer]) Transition of Care Consult (CM Consult): Discharge Planning, SNF ([facility]) Discharge Durable Medical Equipment: No 
Physical Therapy Consult: Yes Occupational Therapy Consult: Yes Speech Therapy Consult: No 
Current Support Network: Own Home, Lives Alone Confirm Follow Up Transport: Other (see comment) ([facility]) Plan discussed with Pt/Family/Caregiver: Yes [employer] Information Provided?: No 
Discharge Location Discharge Placement: Rehab Unit Subacute

## 2018-09-27 NOTE — PROGRESS NOTES
Problem: Self Care Deficits Care Plan (Adult) Goal: *Acute Goals and Plan of Care (Insert Text) 1. Patient will complete lower body bathing and dressing with minimal assistance and adaptive equipment as needed. 2. Patient will complete toileting with supervision. 3. Patient will tolerate 30 minutes of OT treatment with 1-2 rest breaks to increase activity tolerance for ADLs. 4. Patient will complete functional transfers with modified independence and adaptive equipment as needed. 5. Patient will complete functional mobility for household distances with supervision and minimal cues to increase amplitude of movement. Timeframe: 7 visits OCCUPATIONAL THERAPY: Initial Assessment, Treatment Day: 1st and AM 9/27/2018 INPATIENT: Hospital Day: 4 [place] / [place] / Product Type: Medicare /  
  
NAME/AGE/GENDER: Shae Brizuela is a 76 y.o. female PRIMARY DIAGNOSIS:  Leukocytosis Post-operative infection Leukocytosis Leukocytosis ICD-10: Treatment Diagnosis:  
 · Generalized Muscle Weakness (M62.81) · Other lack of cordination (R27.8) · Repeated Falls (R29.6) Precautions/Allergies: 
   Flagyl [metronidazole]; Aspirin; Contrast agent [iodine]; Gabapentin; Gluten; Milk containing products; Morphine; Omnipaque rediflo [iohexol]; and Red dye ASSESSMENT:  
 
Ms. Toma Mock presents to the hospital from [facility] with post-op infection. Pt was living alone in [place] [address] with caregivers 3 hrs/day prior to admission for colectomy on 9/5. Pt reports hx of falls. Pt has Parkinson's disease. Pt was sitting up in the chair upon arrival. Pt is very pleasant but does appear to have some cognitive deficits through conversation. Pt is limited with lower body ADL for dressing/bathing. Pt needed maximal to total assistance with donning/doffing socks and shoes.  Pt also assisted with applying lotion to her legs due to pain with bending forward. Pt unable to cross legs and does appear somewhat rigid. Pt completes small shuffling steps with fair dynamic balance. Pt's base of support is narrowed with some scissoring of her LEs. Pt has rolling walker present in the room with pt completing functional mobility with walker and cueing for increasing amplitude of steps. Pt completed rest break and completed more functional mobility in the hallway without walker and working on improving arm swing and amplitude of steps. Pt responds well to cueing but does have decreased attention with pt needing moderate cueing. Pt educated on outpatient PD program (LSVT and PWR) for the future and pt appears receptive. Pt also demonstrates decreased upper body coordination, more so on the L vs R. Pt is currently limited with LB ADL, functional mobility, strength, coordination, and dynamic balance. Pt will benefit from continued OT services to address stated goals and plan of care. This section established at most recent assessment PROBLEM LIST (Impairments causing functional limitations): 1. Decreased Strength 2. Decreased ADL/Functional Activities 3. Decreased Transfer Abilities 4. Decreased Ambulation Ability/Technique 5. Decreased Balance 6. Increased Pain 7. Decreased Activity Tolerance 8. Decreased Flexibility/Joint Mobility 9. Decreased [place] with Home Exercise Program 
10. Decreased Cognition INTERVENTIONS PLANNED: (Benefits and precautions of occupational therapy have been discussed with the patient.) 1. Activities of daily living training 2. Adaptive equipment training 3. Balance training 4. Clothing management 5. Donning&doffing training 6. Neuromuscular re-eduation 7. Therapeutic activity 8. Therapeutic exercise TREATMENT PLAN: Frequency/Duration: Follow patient 3 times per week to address above goals. Rehabilitation Potential For Stated Goals: Excellent RECOMMENDED REHABILITATION/EQUIPMENT: (at time of discharge pending progress): Due to the probability of continued deficits (see above) this patient will likely need continued skilled occupational therapy after discharge. Equipment: ? TBD  
    
 
 
 
OCCUPATIONAL PROFILE AND HISTORY:  
History of Present Injury/Illness (Reason for Referral): 
See H&P Past Medical History/Comorbidities: Ms. Ania Gamez  has a past medical history of Abnormal gait (7/5/2016); Acute serous otitis media of left ear; Anxiety; Bilateral carpal tunnel syndrome (7/5/2016); Chronic abdominal pain (7/5/2016); Dementia due to Parkinson's disease without behavioral disturbance ([facility] [address] (7/5/2016); Depression; Diverticulitis; Gastritis; GERD (gastroesophageal reflux disease); Heart murmur; Hypercholesterolemia; Hypertension; Neuropathic pain (7/5/2016); Nontoxic multinodular goiter (7/5/2016); Orthostatic hypotension; Parkinson's disease ([facility] [address] Peripheral neuropathy; Syncope (10/10/2015); Type 2 diabetes mellitus without complication (6/0/4831); Urinary frequency (7/5/2016); Vaginal bleeding; and Weight loss, unintentional. She also has no past medical history of Difficult intubation; Malignant hyperthermia due to anesthesia; or Pseudocholinesterase deficiency. Ms. Ania Gamez  has a past surgical history that includes hx cholecystectomy (2001); hx tubal ligation (1974); hx appendectomy (1974); and colonoscopy (N/A, 8/6/2018). Social History/Living Environment:  
Home Environment: Private residence # Steps to Enter: 0 One/Two Story Residence: One story Living Alone: Yes Support Systems: Family member(s) Patient Expects to be Discharged to[de-identified] Rehabilitation facility Current DME Used/Available at Home: Cane, straight, Walker, rolling Prior Level of Function/Work/Activity: Pt was living alone in [place] [address] with caregivers 3 hrs/day prior to admission for colectomy on 9/5. Pt reports hx of falls.  Pt was using a cane for functional mobility. Pt's caregivers assisted her with household chores/cooking. Personal Factors:   
      Social Background:  Living alone prior to sx Overall Behavior:  Decreased attention; memory deficits Other factors that influence how disability is experienced by the patient:  Multiple co-morbidities Number of Personal Factors/Comorbidities that affect the Plan of Care: Extensive review of physical, cognitive, and psychosocial performance (3+):  HIGH COMPLEXITY ASSESSMENT OF OCCUPATIONAL PERFORMANCE[de-identified]  
Activities of Daily Living:  
Basic ADLs (From Assessment) Complex ADLs (From Assessment) Feeding: Supervision Oral Facial Hygiene/Grooming: Stand-by assistance Bathing: Moderate assistance Upper Body Dressing: Minimum assistance Lower Body Dressing: Moderate assistance Toileting: Minimum assistance Instrumental ADL Meal Preparation: Total assistance Homemaking: Total assistance Grooming/Bathing/Dressing Activities of Daily Living Cognitive Retraining Safety/Judgement: Fall prevention Bed/Mat Mobility Rolling: Supervision Supine to Sit: Supervision Sit to Supine: Supervision Sit to Stand: Supervision Bed to Chair: Stand-by assistance Scooting: Supervision Most Recent Physical Functioning:  
Gross Assessment: 
AROM: Generally decreased, functional 
Strength: Generally decreased, functional 
Coordination: Generally decreased, functional 
         
  
Posture: 
Posture (WDL): Exceptions to [facility] Posture Assessment: Forward head, Rounded shoulders Balance: 
Sitting: Intact Standing: Impaired Standing - Static: Good Standing - Dynamic : Fair Bed Mobility: 
Rolling: Supervision Supine to Sit: Supervision Sit to Supine: Supervision Scooting: Supervision Wheelchair Mobility: 
  
Transfers: 
Sit to Stand: Supervision Stand to Sit: Supervision Bed to Chair: Stand-by assistance Patient Vitals for the past 6 hrs: BP SpO2 Pulse  
09/27/18 0732 129/63 97 % 71  
09/27/18 1107 158/73 99 % 76 Mental Status Neurologic State: Alert Orientation Level: Oriented to person Cognition: Decreased attention/concentration, Follows commands, Memory loss Perception: Appears intact Perseveration: No perseveration noted Safety/Judgement: Fall prevention Physical Skills Involved: 1. Range of Motion 2. Balance 3. Strength 4. Fine Motor Control 5. Gross Motor Control 6. Pain (acute) 7. Pain (Chronic) Cognitive Skills Affected (resulting in the inability to perform in a timely and safe manner): 1. Executive Function 2. Short Term Recall 3. Sustained Attention 4. Divided Attention Psychosocial Skills Affected: 1. Habits/Routines 2. Environmental Adaptation 3. Self-Awareness Number of elements that affect the Plan of Care: 5+:  HIGH COMPLEXITY CLINICAL DECISION MAKING:  
[facility] AM-PAC 6 Clicks Daily Activity Inpatient Short Form How much help from another person does the patient currently need. .. Total A Lot A Little None 1. Putting on and taking off regular lower body clothing? [] 1   [x] 2   [] 3   [] 4  
2. Bathing (including washing, rinsing, drying)? [] 1   [x] 2   [] 3   [] 4  
3. Toileting, which includes using toilet, bedpan or urinal?   [] 1   [x] 2   [] 3   [] 4  
4. Putting on and taking off regular upper body clothing? [] 1   [] 2   [x] 3   [] 4  
5. Taking care of personal grooming such as brushing teeth? [] 1   [] 2   [x] 3   [] 4  
6. Eating meals? [] 1   [] 2   [x] 3   [] 4  
© 2007, Trustees of [facility], under license to [employer]. All rights reserved Score:  Initial: 15 Most Recent: X (Date: -- ) Interpretation of Tool:  Represents activities that are increasingly more difficult (i.e. Bed mobility, Transfers, Gait). Score 24 23 22-20 19-15 14-10 9-7 6 Modifier CH CI CJ CK CL CM CN   
 
? Self Care:  - CURRENT STATUS: CK - 40%-59% impaired, limited or restricted  - GOAL STATUS: CJ - 20%-39% impaired, limited or restricted  - D/C STATUS:  ---------------To be determined--------------- [place] / [place] PART A [place] B / Product Type: Medicare /   
 
Medical Necessity:    
· Patient demonstrates excellent rehab potential due to higher previous functional level. Reason for Services/Other Comments: 
· Patient continues to require skilled intervention due to decreased independence with ADL/functional transfers. Use of outcome tool(s) and clinical judgement create a POC that gives a: LOW COMPLEXITY  
 
 
 
TREATMENT:  
(In addition to Assessment/Re-Assessment sessions the following treatments were rendered) Pre-treatment Symptoms/Complaints:   
Pain: Initial:  
Pain Intensity 1: 10 
Pain Location 1: Foot, Leg 
Pain Orientation 1: Left, Right Pain Intervention(s) 1: Repositioned  Post Session:  same Therapeutic Activity: (    15): Therapeutic activities including Ambulation on level ground and improving amplitude of movement with steps and arm swing to improve mobility, strength, balance and coordination. Required minimal tactile cues   to promote dynamic balance in standing.  
 
n/a Braces/Orthotics/Lines/Etc:  
· O2 Device: Room air Treatment/Session Assessment:   
· Response to Treatment:  Pt tolerated well with good response to education of taking bigger steps and for turning. · Interdisciplinary Collaboration:  
o Occupational Therapist 
o Registered Nurse · After treatment position/precautions:  
o Up in chair 
o Bed/Chair-wheels locked 
o Call light within reach 
o RN notified · Compliance with Program/Exercises: Will assess as treatment progresses. · Recommendations/Intent for next treatment session: \"Next visit will focus on advancements to more challenging activities and reduction in assistance provided\". Total Treatment Duration: OT Patient Time In/Time Out Time In: 1130 Time Out: 1200 Corinna Mckeon, OT

## 2018-09-28 ENCOUNTER — PATIENT OUTREACH (OUTPATIENT)
Dept: CASE MANAGEMENT | Age: 68
End: 2018-09-28

## 2018-09-28 NOTE — PROGRESS NOTES
Per [facility] patient discharged to [place] [address] a Preferred Provider [place] on 09/27/2018. Patient will be included in weekly care coordination calls, Care Coordinator will send patient discharge disposition to Aleena Macdonald RN [facility]. This note will not be viewable in [address]

## 2019-01-11 ENCOUNTER — HOSPITAL ENCOUNTER (OUTPATIENT)
Dept: LAB | Age: 69
Discharge: HOME | End: 2019-01-11
Payer: MEDICARE

## 2019-01-11 DIAGNOSIS — R68.89 OTHER GENERAL SYMPTOMS AND SIGNS: ICD-10-CM

## 2019-01-11 DIAGNOSIS — D50.9 IRON DEFICIENCY ANEMIA, UNSPECIFIED IRON DEFICIENCY ANEMIA TYPE: ICD-10-CM

## 2019-01-11 DIAGNOSIS — E55.9 VITAMIN D DEFICIENCY: ICD-10-CM

## 2019-01-11 LAB
ALBUMIN SERPL-MCNC: 3.9 G/DL (ref 3.2–4.6)
ALBUMIN/GLOB SERPL: 1.1 {RATIO} (ref 1.2–3.5)
ALP SERPL-CCNC: 95 U/L (ref 50–136)
ALT SERPL-CCNC: <6 U/L (ref 12–65)
ANION GAP SERPL CALC-SCNC: 6 MMOL/L (ref 7–16)
AST SERPL-CCNC: 7 U/L (ref 15–37)
BASOPHILS # BLD: 0 K/UL (ref 0–0.2)
BASOPHILS NFR BLD: 1 % (ref 0–2)
BILIRUB SERPL-MCNC: 0.3 MG/DL (ref 0.2–1.1)
BUN SERPL-MCNC: 17 MG/DL (ref 8–23)
CALCIUM SERPL-MCNC: 8.7 MG/DL (ref 8.3–10.4)
CHLORIDE SERPL-SCNC: 105 MMOL/L (ref 98–107)
CO2 SERPL-SCNC: 29 MMOL/L (ref 21–32)
CREAT SERPL-MCNC: 1.16 MG/DL (ref 0.6–1)
DIFFERENTIAL METHOD BLD: ABNORMAL
EOSINOPHIL # BLD: 0 K/UL (ref 0–0.8)
EOSINOPHIL NFR BLD: 0 % (ref 0.5–7.8)
ERYTHROCYTE [DISTWIDTH] IN BLOOD BY AUTOMATED COUNT: 15.3 % (ref 11.9–14.6)
FERRITIN SERPL-MCNC: 8 NG/ML (ref 8–388)
GLOBULIN SER CALC-MCNC: 3.5 G/DL (ref 2.3–3.5)
GLUCOSE SERPL-MCNC: 82 MG/DL (ref 65–100)
HCT VFR BLD AUTO: 34.1 % (ref 35.8–46.3)
HGB BLD-MCNC: 10.5 G/DL (ref 11.7–15.4)
IMM GRANULOCYTES # BLD AUTO: 0 K/UL (ref 0–0.5)
IMM GRANULOCYTES NFR BLD AUTO: 0 % (ref 0–5)
LYMPHOCYTES # BLD: 1.8 K/UL (ref 0.5–4.6)
LYMPHOCYTES NFR BLD: 29 % (ref 13–44)
MCH RBC QN AUTO: 24 PG (ref 26.1–32.9)
MCHC RBC AUTO-ENTMCNC: 30.8 G/DL (ref 31.4–35)
MCV RBC AUTO: 77.9 FL (ref 79.6–97.8)
MONOCYTES # BLD: 0.5 K/UL (ref 0.1–1.3)
MONOCYTES NFR BLD: 9 % (ref 4–12)
NEUTS SEG # BLD: 3.7 K/UL (ref 1.7–8.2)
NEUTS SEG NFR BLD: 61 % (ref 43–78)
NRBC # BLD: 0 K/UL (ref 0–0.2)
PLATELET # BLD AUTO: 232 K/UL (ref 150–450)
PMV BLD AUTO: 10.4 FL (ref 9.4–12.3)
POTASSIUM SERPL-SCNC: 4.1 MMOL/L (ref 3.5–5.1)
PROT SERPL-MCNC: 7.4 G/DL (ref 6.3–8.2)
RBC # BLD AUTO: 4.38 M/UL (ref 4.05–5.25)
SODIUM SERPL-SCNC: 140 MMOL/L (ref 136–145)
VIT B12 SERPL-MCNC: 224 PG/ML (ref 193–986)
WBC # BLD AUTO: 6.1 K/UL (ref 4.3–11.1)

## 2019-01-11 PROCEDURE — 85025 COMPLETE CBC W/AUTO DIFF WBC: CPT

## 2019-01-11 PROCEDURE — 84165 PROTEIN E-PHORESIS SERUM: CPT

## 2019-01-11 PROCEDURE — 82728 ASSAY OF FERRITIN: CPT

## 2019-01-11 PROCEDURE — 82652 VIT D 1 25-DIHYDROXY: CPT

## 2019-01-11 PROCEDURE — 86334 IMMUNOFIX E-PHORESIS SERUM: CPT

## 2019-01-11 PROCEDURE — 36415 COLL VENOUS BLD VENIPUNCTURE: CPT

## 2019-01-11 PROCEDURE — 80053 COMPREHEN METABOLIC PANEL: CPT

## 2019-01-11 PROCEDURE — 82607 VITAMIN B-12: CPT

## 2019-01-14 LAB
1,25(OH)2D3 SERPL-MCNC: 99.6 PG/ML (ref 19.9–79.3)
ALBUMIN SERPL ELPH-MCNC: 3.8 G/DL (ref 3.2–5.6)
ALBUMIN/GLOB SERPL: 1.2 {RATIO}
ALPHA1 GLOB SERPL ELPH-MCNC: 0.31 G/DL (ref 0.1–0.4)
ALPHA2 GLOB SERPL ELPH-MCNC: 0.8 G/DL (ref 0.4–1.2)
B-GLOBULIN SERPL QL ELPH: 1.23 G/DL (ref 0.6–1.3)
GAMMA GLOB MFR SERPL ELPH: 0.97 G/DL (ref 0.5–1.6)
IGA SERPL-MCNC: 144 MG/DL (ref 85–499)
IGG SERPL-MCNC: 842 MG/DL (ref 610–1616)
IGM SERPL-MCNC: 138 MG/DL (ref 35–242)
M PROTEIN SERPL ELPH-MCNC: NORMAL G/DL
PROT PATTERN SERPL ELPH-IMP: NORMAL
PROT PATTERN SPEC IFE-IMP: NORMAL
PROT SERPL-MCNC: 7.1 G/DL (ref 6.3–8.2)

## 2019-01-17 ENCOUNTER — APPOINTMENT (OUTPATIENT)
Dept: INFUSION THERAPY | Age: 69
End: 2019-01-17
Payer: MEDICARE

## 2019-01-18 ENCOUNTER — HOSPITAL ENCOUNTER (OUTPATIENT)
Dept: INFUSION THERAPY | Age: 69
Discharge: HOME | End: 2019-01-18
Payer: MEDICARE

## 2019-01-18 VITALS
RESPIRATION RATE: 18 BRPM | OXYGEN SATURATION: 99 % | SYSTOLIC BLOOD PRESSURE: 167 MMHG | HEART RATE: 74 BPM | DIASTOLIC BLOOD PRESSURE: 78 MMHG | TEMPERATURE: 98.1 F

## 2019-01-18 DIAGNOSIS — D50.9 IRON DEFICIENCY ANEMIA, UNSPECIFIED IRON DEFICIENCY ANEMIA TYPE: Primary | ICD-10-CM

## 2019-01-18 LAB
IRON SATN MFR SERPL: 7 %
IRON SERPL-MCNC: 28 UG/DL (ref 35–150)
TIBC SERPL-MCNC: 422 UG/DL (ref 250–450)

## 2019-01-18 PROCEDURE — 96365 THER/PROPH/DIAG IV INF INIT: CPT

## 2019-01-18 PROCEDURE — 83540 ASSAY OF IRON: CPT

## 2019-01-18 PROCEDURE — 74011250636 HC RX REV CODE- 250/636: Performed by: INTERNAL MEDICINE

## 2019-01-18 RX ORDER — SODIUM CHLORIDE 0.9 % (FLUSH) 0.9 %
10 SYRINGE (ML) INJECTION AS NEEDED
Status: COMPLETED | OUTPATIENT
Start: 2019-01-18 | End: 2019-01-18

## 2019-01-18 RX ORDER — SODIUM CHLORIDE 0.9 % (FLUSH) 0.9 %
10 SYRINGE (ML) INJECTION AS NEEDED
Status: ACTIVE | OUTPATIENT
Start: 2019-01-18 | End: 2019-01-18

## 2019-01-18 RX ADMIN — SODIUM CHLORIDE 500 ML: 900 INJECTION, SOLUTION INTRAVENOUS at 11:20

## 2019-01-18 RX ADMIN — Medication 10 ML: at 10:55

## 2019-01-18 RX ADMIN — FERRIC CARBOXYMALTOSE INJECTION 750 MG: 50 INJECTION, SOLUTION INTRAVENOUS at 11:00

## 2019-01-18 NOTE — PROGRESS NOTES
Arrived to the [facility]. Day 1 Cycle 1 Injectafer completed. Patient tolerated well. Any issues or concerns during appointment: NO.  Patient aware of next infusion appointment on 02/1/19 (date) at 80 (time). Discharged via wheelchair with daughter.

## 2019-01-22 DIAGNOSIS — R11.2 NAUSEA AND VOMITING, INTRACTABILITY OF VOMITING NOT SPECIFIED, UNSPECIFIED VOMITING TYPE: ICD-10-CM

## 2019-01-22 DIAGNOSIS — R10.32 LLQ ABDOMINAL PAIN: ICD-10-CM

## 2019-01-22 DIAGNOSIS — Z87.19 HISTORY OF DIVERTICULITIS: ICD-10-CM

## 2019-02-01 ENCOUNTER — HOSPITAL ENCOUNTER (OUTPATIENT)
Dept: LAB | Age: 69
Discharge: HOME | End: 2019-02-01
Payer: MEDICARE

## 2019-02-01 ENCOUNTER — HOSPITAL ENCOUNTER (OUTPATIENT)
Dept: INFUSION THERAPY | Age: 69
Discharge: HOME | End: 2019-02-01
Payer: MEDICARE

## 2019-02-01 DIAGNOSIS — D50.9 IRON DEFICIENCY ANEMIA, UNSPECIFIED IRON DEFICIENCY ANEMIA TYPE: ICD-10-CM

## 2019-02-01 DIAGNOSIS — D50.9 IRON DEFICIENCY ANEMIA, UNSPECIFIED IRON DEFICIENCY ANEMIA TYPE: Primary | ICD-10-CM

## 2019-02-01 LAB
ALBUMIN SERPL-MCNC: 3.5 G/DL (ref 3.2–4.6)
ALBUMIN/GLOB SERPL: 1.1 {RATIO} (ref 1.2–3.5)
ALP SERPL-CCNC: 107 U/L (ref 50–136)
ALT SERPL-CCNC: 6 U/L (ref 12–65)
ANION GAP SERPL CALC-SCNC: 6 MMOL/L (ref 7–16)
AST SERPL-CCNC: 9 U/L (ref 15–37)
BASOPHILS # BLD: 0.1 K/UL (ref 0–0.2)
BASOPHILS NFR BLD: 1 % (ref 0–2)
BILIRUB SERPL-MCNC: 0.4 MG/DL (ref 0.2–1.1)
BUN SERPL-MCNC: 16 MG/DL (ref 8–23)
CALCIUM SERPL-MCNC: 8.2 MG/DL (ref 8.3–10.4)
CHLORIDE SERPL-SCNC: 111 MMOL/L (ref 98–107)
CO2 SERPL-SCNC: 25 MMOL/L (ref 21–32)
CREAT SERPL-MCNC: 1.05 MG/DL (ref 0.6–1)
DIFFERENTIAL METHOD BLD: ABNORMAL
EOSINOPHIL # BLD: 0 K/UL (ref 0–0.8)
EOSINOPHIL NFR BLD: 0 % (ref 0.5–7.8)
ERYTHROCYTE [DISTWIDTH] IN BLOOD BY AUTOMATED COUNT: 21.2 % (ref 11.9–14.6)
GLOBULIN SER CALC-MCNC: 3.1 G/DL (ref 2.3–3.5)
GLUCOSE SERPL-MCNC: 101 MG/DL (ref 65–100)
HCT VFR BLD AUTO: 32.7 % (ref 35.8–46.3)
HGB BLD-MCNC: 10.3 G/DL (ref 11.7–15.4)
IMM GRANULOCYTES # BLD AUTO: 0 K/UL (ref 0–0.5)
IMM GRANULOCYTES NFR BLD AUTO: 1 % (ref 0–5)
LYMPHOCYTES # BLD: 1.3 K/UL (ref 0.5–4.6)
LYMPHOCYTES NFR BLD: 22 % (ref 13–44)
MCH RBC QN AUTO: 24.9 PG (ref 26.1–32.9)
MCHC RBC AUTO-ENTMCNC: 31.5 G/DL (ref 31.4–35)
MCV RBC AUTO: 79.2 FL (ref 79.6–97.8)
MONOCYTES # BLD: 0.5 K/UL (ref 0.1–1.3)
MONOCYTES NFR BLD: 8 % (ref 4–12)
NEUTS SEG # BLD: 4.1 K/UL (ref 1.7–8.2)
NEUTS SEG NFR BLD: 68 % (ref 43–78)
NRBC # BLD: 0 K/UL (ref 0–0.2)
PLATELET # BLD AUTO: 232 K/UL (ref 150–450)
PMV BLD AUTO: 10.9 FL (ref 9.4–12.3)
POTASSIUM SERPL-SCNC: 3.5 MMOL/L (ref 3.5–5.1)
PROT SERPL-MCNC: 6.6 G/DL (ref 6.3–8.2)
RBC # BLD AUTO: 4.13 M/UL (ref 4.05–5.25)
SODIUM SERPL-SCNC: 142 MMOL/L (ref 136–145)
WBC # BLD AUTO: 6 K/UL (ref 4.3–11.1)

## 2019-02-01 PROCEDURE — 83921 ORGANIC ACID SINGLE QUANT: CPT

## 2019-02-01 PROCEDURE — 80053 COMPREHEN METABOLIC PANEL: CPT

## 2019-02-01 PROCEDURE — 74011250636 HC RX REV CODE- 250/636: Performed by: INTERNAL MEDICINE

## 2019-02-01 PROCEDURE — 85025 COMPLETE CBC W/AUTO DIFF WBC: CPT

## 2019-02-01 PROCEDURE — 96365 THER/PROPH/DIAG IV INF INIT: CPT

## 2019-02-01 PROCEDURE — 36415 COLL VENOUS BLD VENIPUNCTURE: CPT

## 2019-02-01 RX ADMIN — SODIUM CHLORIDE 500 ML: 900 INJECTION, SOLUTION INTRAVENOUS at 11:20

## 2019-02-01 RX ADMIN — FERRIC CARBOXYMALTOSE INJECTION 750 MG: 50 INJECTION, SOLUTION INTRAVENOUS at 11:51

## 2019-02-01 NOTE — PROGRESS NOTES
[facility] Injectafer  completed. Patient tolerated well. Any issues or concerns during appointment: none. Patient aware of next infusion appointment on 02.15.2019 (date) at [phone] (time). Discharged ambulatory with wheel walker and daughter to home.

## 2019-02-05 LAB
Lab: ABNORMAL
METHYLMALONATE SERPL-SCNC: 462 NMOL/L (ref 0–378)

## 2019-02-15 ENCOUNTER — HOSPITAL ENCOUNTER (OUTPATIENT)
Dept: LAB | Age: 69
Discharge: HOME | End: 2019-02-15
Payer: MEDICARE

## 2019-02-15 ENCOUNTER — HOSPITAL ENCOUNTER (OUTPATIENT)
Dept: INFUSION THERAPY | Age: 69
Discharge: HOME | End: 2019-02-15
Payer: MEDICARE

## 2019-02-15 VITALS
OXYGEN SATURATION: 98 % | DIASTOLIC BLOOD PRESSURE: 63 MMHG | RESPIRATION RATE: 18 BRPM | HEART RATE: 66 BPM | SYSTOLIC BLOOD PRESSURE: 112 MMHG

## 2019-02-15 DIAGNOSIS — D50.9 IRON DEFICIENCY ANEMIA, UNSPECIFIED IRON DEFICIENCY ANEMIA TYPE: Primary | ICD-10-CM

## 2019-02-15 DIAGNOSIS — D50.8 OTHER IRON DEFICIENCY ANEMIA: ICD-10-CM

## 2019-02-15 PROBLEM — E53.8 VITAMIN B12 DEFICIENCY: Status: ACTIVE | Noted: 2019-02-15

## 2019-02-15 LAB
ALBUMIN SERPL-MCNC: 4.1 G/DL (ref 3.2–4.6)
ALBUMIN/GLOB SERPL: 1.3 {RATIO} (ref 1.2–3.5)
ALP SERPL-CCNC: 131 U/L (ref 50–136)
ALT SERPL-CCNC: 8 U/L (ref 12–65)
ANION GAP SERPL CALC-SCNC: 4 MMOL/L (ref 7–16)
AST SERPL-CCNC: 11 U/L (ref 15–37)
BASOPHILS # BLD: 0 K/UL (ref 0–0.2)
BASOPHILS NFR BLD: 1 % (ref 0–2)
BILIRUB SERPL-MCNC: 0.6 MG/DL (ref 0.2–1.1)
BUN SERPL-MCNC: 12 MG/DL (ref 8–23)
CALCIUM SERPL-MCNC: 8.5 MG/DL (ref 8.3–10.4)
CHLORIDE SERPL-SCNC: 105 MMOL/L (ref 98–107)
CO2 SERPL-SCNC: 28 MMOL/L (ref 21–32)
CREAT SERPL-MCNC: 1.09 MG/DL (ref 0.6–1)
DIFFERENTIAL METHOD BLD: ABNORMAL
EOSINOPHIL # BLD: 0 K/UL (ref 0–0.8)
EOSINOPHIL NFR BLD: 0 % (ref 0.5–7.8)
ERYTHROCYTE [DISTWIDTH] IN BLOOD BY AUTOMATED COUNT: 22.6 % (ref 11.9–14.6)
GLOBULIN SER CALC-MCNC: 3.1 G/DL (ref 2.3–3.5)
GLUCOSE SERPL-MCNC: 91 MG/DL (ref 65–100)
HCT VFR BLD AUTO: 37 % (ref 35.8–46.3)
HGB BLD-MCNC: 11.7 G/DL (ref 11.7–15.4)
IMM GRANULOCYTES # BLD AUTO: 0 K/UL (ref 0–0.5)
IMM GRANULOCYTES NFR BLD AUTO: 0 % (ref 0–5)
LYMPHOCYTES # BLD: 0.8 K/UL (ref 0.5–4.6)
LYMPHOCYTES NFR BLD: 17 % (ref 13–44)
MCH RBC QN AUTO: 26 PG (ref 26.1–32.9)
MCHC RBC AUTO-ENTMCNC: 31.6 G/DL (ref 31.4–35)
MCV RBC AUTO: 82.2 FL (ref 79.6–97.8)
MONOCYTES # BLD: 0.5 K/UL (ref 0.1–1.3)
MONOCYTES NFR BLD: 10 % (ref 4–12)
NEUTS SEG # BLD: 3.5 K/UL (ref 1.7–8.2)
NEUTS SEG NFR BLD: 72 % (ref 43–78)
NRBC # BLD: 0 K/UL (ref 0–0.2)
PLATELET # BLD AUTO: 178 K/UL (ref 150–450)
PMV BLD AUTO: 10.4 FL (ref 9.4–12.3)
POTASSIUM SERPL-SCNC: 3.5 MMOL/L (ref 3.5–5.1)
PROT SERPL-MCNC: 7.2 G/DL (ref 6.3–8.2)
RBC # BLD AUTO: 4.5 M/UL (ref 4.05–5.25)
SODIUM SERPL-SCNC: 137 MMOL/L (ref 136–145)
WBC # BLD AUTO: 4.9 K/UL (ref 4.3–11.1)

## 2019-02-15 PROCEDURE — 74011250636 HC RX REV CODE- 250/636: Performed by: INTERNAL MEDICINE

## 2019-02-15 PROCEDURE — 96365 THER/PROPH/DIAG IV INF INIT: CPT

## 2019-02-15 PROCEDURE — 85025 COMPLETE CBC W/AUTO DIFF WBC: CPT

## 2019-02-15 PROCEDURE — 96361 HYDRATE IV INFUSION ADD-ON: CPT

## 2019-02-15 PROCEDURE — 36415 COLL VENOUS BLD VENIPUNCTURE: CPT

## 2019-02-15 PROCEDURE — 80053 COMPREHEN METABOLIC PANEL: CPT

## 2019-02-15 RX ORDER — SODIUM CHLORIDE 0.9 % (FLUSH) 0.9 %
10 SYRINGE (ML) INJECTION AS NEEDED
Status: ACTIVE | OUTPATIENT
Start: 2019-02-15 | End: 2019-02-16

## 2019-02-15 RX ADMIN — Medication 10 ML: at 13:10

## 2019-02-15 RX ADMIN — FERRIC CARBOXYMALTOSE INJECTION 750 MG: 50 INJECTION, SOLUTION INTRAVENOUS at 13:15

## 2019-02-15 RX ADMIN — SODIUM CHLORIDE 500 ML: 900 INJECTION, SOLUTION INTRAVENOUS at 13:35

## 2019-02-15 NOTE — PROGRESS NOTES
Pt to area without complaints. Received treatment per order, tolerated well. Aware of next appt follow up with Dr [email or web address]. Advised to call dr with any issues/concerns. Discharged home without complaints.

## 2019-03-22 ENCOUNTER — HOSPITAL ENCOUNTER (OUTPATIENT)
Dept: LAB | Age: 69
Discharge: HOME | End: 2019-03-22
Payer: MEDICARE

## 2019-03-22 DIAGNOSIS — E53.8 VITAMIN B12 DEFICIENCY: ICD-10-CM

## 2019-03-22 DIAGNOSIS — D50.9 IRON DEFICIENCY ANEMIA, UNSPECIFIED IRON DEFICIENCY ANEMIA TYPE: ICD-10-CM

## 2019-03-22 LAB
ALBUMIN SERPL-MCNC: 4 G/DL (ref 3.2–4.6)
ALBUMIN/GLOB SERPL: 1.3 {RATIO} (ref 1.2–3.5)
ALP SERPL-CCNC: 133 U/L (ref 50–136)
ALT SERPL-CCNC: 8 U/L (ref 12–65)
ANION GAP SERPL CALC-SCNC: 10 MMOL/L
ANION GAP SERPL CALC-SCNC: ABNORMAL MMOL/L (ref 7–16)
AST SERPL-CCNC: 14 U/L (ref 15–37)
BASOPHILS # BLD: 0 K/UL (ref 0–0.2)
BASOPHILS NFR BLD: 0 % (ref 0–2)
BILIRUB SERPL-MCNC: 1 MG/DL (ref 0.2–1.1)
BUN SERPL-MCNC: 22 MG/DL (ref 8–23)
CALCIUM SERPL-MCNC: 8.5 MG/DL (ref 8.3–10.4)
CHLORIDE SERPL-SCNC: 105 MMOL/L (ref 98–107)
CO2 SERPL-SCNC: 28 MMOL/L (ref 21–32)
CREAT SERPL-MCNC: 1.26 MG/DL (ref 0.6–1)
DIFFERENTIAL METHOD BLD: ABNORMAL
EOSINOPHIL # BLD: 0 K/UL (ref 0–0.8)
EOSINOPHIL NFR BLD: 0 % (ref 0.5–7.8)
ERYTHROCYTE [DISTWIDTH] IN BLOOD BY AUTOMATED COUNT: 23.1 % (ref 11.9–14.6)
FERRITIN SERPL-MCNC: 1309 NG/ML (ref 8–388)
GLOBULIN SER CALC-MCNC: 3.2 G/DL (ref 2.3–3.5)
GLUCOSE SERPL-MCNC: 104 MG/DL (ref 65–100)
HCT VFR BLD AUTO: 41.8 % (ref 35.8–46.3)
HGB BLD-MCNC: 13.4 G/DL (ref 11.7–15.4)
IMM GRANULOCYTES # BLD AUTO: 0 K/UL (ref 0–0.5)
IMM GRANULOCYTES NFR BLD AUTO: 1 % (ref 0–5)
LYMPHOCYTES # BLD: 0.9 K/UL (ref 0.5–4.6)
LYMPHOCYTES NFR BLD: 13 % (ref 13–44)
MCH RBC QN AUTO: 27.1 PG (ref 26.1–32.9)
MCHC RBC AUTO-ENTMCNC: 32.1 G/DL (ref 31.4–35)
MCV RBC AUTO: 84.6 FL (ref 79.6–97.8)
MONOCYTES # BLD: 0.6 K/UL (ref 0.1–1.3)
MONOCYTES NFR BLD: 10 % (ref 4–12)
NEUTS SEG # BLD: 4.9 K/UL (ref 1.7–8.2)
NEUTS SEG NFR BLD: 76 % (ref 43–78)
NRBC # BLD: 0 K/UL (ref 0–0.2)
PLATELET # BLD AUTO: 170 K/UL (ref 150–450)
PMV BLD AUTO: 10.5 FL (ref 9.4–12.3)
POTASSIUM SERPL-SCNC: 3.8 MMOL/L (ref 3.5–5.1)
PROT SERPL-MCNC: 7.2 G/DL (ref 6.3–8.2)
RBC # BLD AUTO: 4.94 M/UL (ref 4.05–5.25)
SODIUM SERPL-SCNC: 143 MMOL/L (ref 136–145)
VIT B12 SERPL-MCNC: 1677 PG/ML (ref 193–986)
WBC # BLD AUTO: 6.5 K/UL (ref 4.3–11.1)

## 2019-03-22 PROCEDURE — 85025 COMPLETE CBC W/AUTO DIFF WBC: CPT

## 2019-03-22 PROCEDURE — 82728 ASSAY OF FERRITIN: CPT

## 2019-03-22 PROCEDURE — 36415 COLL VENOUS BLD VENIPUNCTURE: CPT

## 2019-03-22 PROCEDURE — 82607 VITAMIN B-12: CPT

## 2019-03-22 PROCEDURE — 80053 COMPREHEN METABOLIC PANEL: CPT

## 2019-04-16 ENCOUNTER — HOSPITAL ENCOUNTER (OUTPATIENT)
Age: 69
Setting detail: OUTPATIENT SURGERY
Discharge: HOME | End: 2019-04-16
Attending: INTERNAL MEDICINE | Admitting: INTERNAL MEDICINE
Payer: MEDICARE

## 2019-04-16 ENCOUNTER — ANESTHESIA EVENT (OUTPATIENT)
Dept: ENDOSCOPY | Age: 69
End: 2019-04-16
Payer: MEDICARE

## 2019-04-16 ENCOUNTER — ANESTHESIA (OUTPATIENT)
Dept: ENDOSCOPY | Age: 69
End: 2019-04-16
Payer: MEDICARE

## 2019-04-16 VITALS
HEIGHT: 66 IN | RESPIRATION RATE: 18 BRPM | TEMPERATURE: 97 F | SYSTOLIC BLOOD PRESSURE: 160 MMHG | WEIGHT: 107 LBS | DIASTOLIC BLOOD PRESSURE: 89 MMHG | BODY MASS INDEX: 17.19 KG/M2 | HEART RATE: 72 BPM | OXYGEN SATURATION: 99 %

## 2019-04-16 PROCEDURE — 74011250636 HC RX REV CODE- 250/636: Performed by: ANESTHESIOLOGY

## 2019-04-16 PROCEDURE — 76060000032 HC ANESTHESIA 0.5 TO 1 HR: Performed by: INTERNAL MEDICINE

## 2019-04-16 PROCEDURE — 74011250636 HC RX REV CODE- 250/636

## 2019-04-16 PROCEDURE — 76040000025: Performed by: INTERNAL MEDICINE

## 2019-04-16 RX ORDER — SODIUM CHLORIDE, SODIUM LACTATE, POTASSIUM CHLORIDE, CALCIUM CHLORIDE 600; 310; 30; 20 MG/100ML; MG/100ML; MG/100ML; MG/100ML
25 INJECTION, SOLUTION INTRAVENOUS CONTINUOUS
Status: DISCONTINUED | OUTPATIENT
Start: 2019-04-16 | End: 2019-04-16 | Stop reason: HOSPADM

## 2019-04-16 RX ORDER — PROPOFOL 10 MG/ML
INJECTION, EMULSION INTRAVENOUS AS NEEDED
Status: DISCONTINUED | OUTPATIENT
Start: 2019-04-16 | End: 2019-04-16 | Stop reason: HOSPADM

## 2019-04-16 RX ORDER — PROPOFOL 10 MG/ML
INJECTION, EMULSION INTRAVENOUS
Status: DISCONTINUED | OUTPATIENT
Start: 2019-04-16 | End: 2019-04-16 | Stop reason: HOSPADM

## 2019-04-16 RX ORDER — ONDANSETRON 2 MG/ML
4 INJECTION INTRAMUSCULAR; INTRAVENOUS ONCE
Status: COMPLETED | OUTPATIENT
Start: 2019-04-16 | End: 2019-04-16

## 2019-04-16 RX ORDER — SODIUM CHLORIDE, SODIUM LACTATE, POTASSIUM CHLORIDE, CALCIUM CHLORIDE 600; 310; 30; 20 MG/100ML; MG/100ML; MG/100ML; MG/100ML
100 INJECTION, SOLUTION INTRAVENOUS CONTINUOUS
Status: DISCONTINUED | OUTPATIENT
Start: 2019-04-16 | End: 2019-04-16 | Stop reason: HOSPADM

## 2019-04-16 RX ADMIN — SODIUM CHLORIDE, SODIUM LACTATE, POTASSIUM CHLORIDE, AND CALCIUM CHLORIDE: 600; 310; 30; 20 INJECTION, SOLUTION INTRAVENOUS at 11:17

## 2019-04-16 RX ADMIN — SODIUM CHLORIDE, SODIUM LACTATE, POTASSIUM CHLORIDE, AND CALCIUM CHLORIDE 25 ML/HR: 600; 310; 30; 20 INJECTION, SOLUTION INTRAVENOUS at 10:48

## 2019-04-16 RX ADMIN — ONDANSETRON 4 MG: 2 INJECTION INTRAMUSCULAR; INTRAVENOUS at 10:48

## 2019-04-16 RX ADMIN — PROPOFOL 10 MG: 10 INJECTION, EMULSION INTRAVENOUS at 11:30

## 2019-04-16 RX ADMIN — PROPOFOL 120 MCG/KG/MIN: 10 INJECTION, EMULSION INTRAVENOUS at 11:30

## 2019-04-16 RX ADMIN — PROPOFOL 10 MG: 10 INJECTION, EMULSION INTRAVENOUS at 11:28

## 2019-04-16 RX ADMIN — PROPOFOL 50 MG: 10 INJECTION, EMULSION INTRAVENOUS at 11:26

## 2019-04-16 NOTE — ANESTHESIA PREPROCEDURE EVALUATION
Anesthetic History PONV Review of Systems / Medical History Patient summary reviewed and pertinent labs reviewed Pulmonary Within defined limits Neuro/Psych Psychiatric history (Depression) and dementia Comments: Parkinson's Cardiovascular Hypertension: well controlled Hyperlipidemia Exercise tolerance: >4 METS Comments: Orthostatic hypotension Pre syncopal episodes Nml EF  
GI/Hepatic/Renal 
  
GERD: well controlled Endo/Other Diabetes: well controlled, type 2 Hypothyroidism: well controlled Other Findings Comments: Peripheral neuropathy Wt. Loss Sigmoid colectomy Nausea this morning Physical Exam 
 
Airway Mallampati: II 
TM Distance: 4 - 6 cm Neck ROM: normal range of motion Mouth opening: Normal 
 
 Cardiovascular Rhythm: regular Rate: normal 
 
Murmur: Grade 2, Aortic area Dental 
 
Dentition: Caps/crowns Pulmonary Breath sounds clear to auscultation Abdominal 
GI exam deferred Other Findings Anesthetic Plan ASA: 3 Anesthesia type: total IV anesthesia Induction: Intravenous Anesthetic plan and risks discussed with: Patient

## 2019-04-16 NOTE — OP NOTES
Endoscopic Gastroduodenoscopy Procedure Note    Indications: Unexplained iron deficiency anaemia    Anesthesia/Sedation: MAC IV          Procedure Details     Informed consent was obtained for the procedure, including conscious sedation. Risks of infection, perforation, hemorrhage, adverse drug reaction and aspiration were discussed. The patient was placed in the left lateral decubitus position. She was monitored continuously with ECG tracing, pulse oximetry, blood pressure monitoring, and direct observation. The [id number] gastroscope was inserted into the mouth and advanced under direct vision to the second portion of the duodenum. A careful inspection was made as the gastroscope was withdrawn, including a retroflexed view of the proximal stomach; findings and interventions are described below. Appropriate photodocumentation was obtained. Findings:       ESOPHAGUS: The esophagus is normal. The proximal, mid, and distal portions are normal. The Z-Line is intact. STOMACH: The fundus on antegrade and retroflex views is normal. The body, antrum, and pylorus are normal.   DUODENUM: The bulb and second portions are normal.      Specimens: none    Estimated Blood Loss: None           Complications:   None; patient tolerated the procedure well. Attending Attestation:  I performed the procedure. Impression:    -Normal upper endoscopy, with no endoscopic evidence of neoplasia or mucosal abnormality. Recommendations:  1.  Proceed to colonoscopy

## 2019-04-16 NOTE — OP NOTES
COLONOSCOPY    DATE of PROCEDURE: 4/16/2019    MEDICATION:  MAC      INDICATIONS: iron deficiency anemia     INSTRUMENT: [id number]    PROCEDURE: After obtaining informed consent, the patient was placed in the left lateral position and sedated. The endoscope was advanced to the cecum where the appendiceal orifice and ileocecal valve were identified. On withdrawal, the colon was carefully inspected. Retroflexion was performed in the rectum. The patient was taken to the recovery area in stable condition. FINDINGS:  Prep quality was fair. Pan-colonic diverticulosis present. The anastomosis was seen in the sigmoid colon, visible staples, otherwise normal. No large masses or polyps visualized. No fresh or old blood. Estimated blood loss: 0-minimal         IMPRESSION:  1. Pan-colonic diverticulosis     PLAN:  1. Follow up with referring MD   2. Follow up with me in clinic.    3. VIJAYA has resolved, likely due to excessive PPI and decreased oral intake      Gerda Samuel MD  [facility] [place]

## 2019-04-16 NOTE — ANESTHESIA POSTPROCEDURE EVALUATION
Procedure(s): ESOPHAGOGASTRODUODENOSCOPY (EGD) COLONOSCOPY/ 18. 
 
total IV anesthesia Anesthesia Post Evaluation Multimodal analgesia: multimodal analgesia not used between 6 hours prior to anesthesia start to PACU discharge Patient location during evaluation: bedside Patient participation: complete - patient participated Level of consciousness: awake and alert Pain management: adequate Airway patency: patent Anesthetic complications: no 
Cardiovascular status: hemodynamically stable Respiratory status: acceptable Hydration status: acceptable Vitals Value Taken Time /89 4/16/2019 12:29 PM  
Temp 36.1 °C (97 °F) 4/16/2019 11:52 AM  
Pulse 72 4/16/2019 12:29 PM  
Resp 18 4/16/2019 11:59 AM  
SpO2 99 % 4/16/2019 12:29 PM  
Vitals shown include unvalidated device data.

## 2019-04-16 NOTE — DISCHARGE INSTRUCTIONS
Gastrointestinal Esophagogastroduodenoscopy (EGD) - Upper Exam Discharge Instructions    1. Call Dr. Solomon Duggan at [phone] for any problems or questions. 2. Contact the doctor's office for follow up appointment as directed. 3. Medication may cause drowsiness for several hours, therefore:  · Do not drive or operate machinery for remainder of the day. · No alcohol today. · Do not make any important or legal decisions for 24 hours. · Do not sign any legal documents for 24 hours. 5. Ordinarily, you may resume regular diet and activity after exam unless otherwise specified by your physician. 6. For mild soreness in your throat you may use throat lozenges or warm salt-water gargles as needed. Instructions given to Lenard Gordon and other family members. Gastrointestinal Colonoscopy/Flexible Sigmoidoscopy - Lower Exam Discharge Instructions  1. Call Dr. Solomon Duggan at [phone] for any problems or questions. 2. Contact the doctors office for follow up appointment as directed  3. Medication may cause drowsiness for several hours, therefore:  · Do not drive or operate machinery for reminder of the day. · No alcohol today. · Do not make any important or legal decisions for 24 hours. · Do not sign any legal documents for 24 hours. 4. Ordinarily, you may resume regular diet and activity after exam unless otherwise specified by your physician. 5. Because of air put into your colon during exam, you may experience some abdominal distension, relieved by the passage of gas, for several hours. 6. Contact your physician if you have any of the following:  · Excessive amount of bleeding - large amount when having a bowel movement. Occasional specks of blood with bowel movement would not be unusual.  · Severe abdominal pain  · Fever or Chills    Any additional instructions: Follow up as needed      Instructions given to Lenard Gordon and other family members. PLAN:  1.  Follow up with referring MD   2. Follow up with me in clinic.    3. VIJAYA has resolved, likely due to excessive PPI and decreased oral intake

## 2019-04-16 NOTE — ROUTINE PROCESS
Pt. Discharged to car by Maira M Celis with family . Vital signs stable. Able to tolerate PO fluids. Passing gas.  Seen by MD.

## 2019-06-13 PROBLEM — E11.21 TYPE 2 DIABETES MELLITUS WITH NEPHROPATHY (HCC): Status: RESOLVED | Noted: 2018-01-22 | Resolved: 2019-06-13

## 2019-06-13 PROBLEM — G47.9 SLEEP DISORDER: Status: ACTIVE | Noted: 2019-06-13

## 2019-06-20 ENCOUNTER — HOSPITAL ENCOUNTER (EMERGENCY)
Age: 69
Discharge: LEFT BEFORE DISCHARGE | End: 2019-06-20
Attending: EMERGENCY MEDICINE
Payer: MEDICARE

## 2019-06-20 VITALS
RESPIRATION RATE: 18 BRPM | HEIGHT: 66 IN | BODY MASS INDEX: 17.19 KG/M2 | HEART RATE: 72 BPM | TEMPERATURE: 97.4 F | OXYGEN SATURATION: 98 % | DIASTOLIC BLOOD PRESSURE: 74 MMHG | WEIGHT: 107 LBS | SYSTOLIC BLOOD PRESSURE: 134 MMHG

## 2019-06-20 PROCEDURE — 75810000275 HC EMERGENCY DEPT VISIT NO LEVEL OF CARE: Performed by: EMERGENCY MEDICINE

## 2019-07-08 ENCOUNTER — HOME HEALTH ADMISSION (OUTPATIENT)
Dept: HOME HEALTH SERVICES | Facility: HOME HEALTH | Age: 69
End: 2019-07-08

## 2019-07-10 ENCOUNTER — HOME CARE VISIT (OUTPATIENT)
Dept: SCHEDULING | Facility: HOME HEALTH | Age: 69
End: 2019-07-10

## 2019-07-10 NOTE — Clinical Note
[facility] to  [facility].

## 2019-07-18 ENCOUNTER — TELEPHONE (OUTPATIENT)
Dept: NUTRITION | Age: 69
End: 2019-07-18

## 2019-07-18 NOTE — TELEPHONE ENCOUNTER
Nutrition Counseling: Contacted pt regarding referral. See notes documented in Nutrition Counseling Referral for details. Will close referral for this office and notify referring provider.     [address] [employer]  [phone]

## 2019-07-19 ENCOUNTER — HOSPITAL ENCOUNTER (OUTPATIENT)
Dept: LAB | Age: 69
Discharge: HOME | End: 2019-07-19
Payer: MEDICARE

## 2019-07-19 DIAGNOSIS — D50.9 IRON DEFICIENCY ANEMIA, UNSPECIFIED IRON DEFICIENCY ANEMIA TYPE: ICD-10-CM

## 2019-07-19 DIAGNOSIS — E53.8 VITAMIN B12 DEFICIENCY: ICD-10-CM

## 2019-07-19 LAB
ALBUMIN SERPL-MCNC: 3.7 G/DL (ref 3.2–4.6)
ALBUMIN/GLOB SERPL: 1.3 {RATIO} (ref 1.2–3.5)
ALP SERPL-CCNC: 100 U/L (ref 50–136)
ALT SERPL-CCNC: 7 U/L (ref 12–65)
ANION GAP SERPL CALC-SCNC: 5 MMOL/L (ref 7–16)
AST SERPL-CCNC: 10 U/L (ref 15–37)
BASOPHILS # BLD: 0 K/UL (ref 0–0.2)
BASOPHILS NFR BLD: 1 % (ref 0–2)
BILIRUB SERPL-MCNC: 0.4 MG/DL (ref 0.2–1.1)
BUN SERPL-MCNC: 14 MG/DL (ref 8–23)
CALCIUM SERPL-MCNC: 8.5 MG/DL (ref 8.3–10.4)
CHLORIDE SERPL-SCNC: 107 MMOL/L (ref 98–107)
CO2 SERPL-SCNC: 31 MMOL/L (ref 21–32)
CREAT SERPL-MCNC: 0.99 MG/DL (ref 0.6–1)
DIFFERENTIAL METHOD BLD: ABNORMAL
EOSINOPHIL # BLD: 0.1 K/UL (ref 0–0.8)
EOSINOPHIL NFR BLD: 2 % (ref 0.5–7.8)
ERYTHROCYTE [DISTWIDTH] IN BLOOD BY AUTOMATED COUNT: 13.3 % (ref 11.9–14.6)
FERRITIN SERPL-MCNC: 834 NG/ML (ref 8–388)
GLOBULIN SER CALC-MCNC: 2.8 G/DL (ref 2.3–3.5)
GLUCOSE SERPL-MCNC: 83 MG/DL (ref 65–100)
HCT VFR BLD AUTO: 38.4 % (ref 35.8–46.3)
HGB BLD-MCNC: 12.2 G/DL (ref 11.7–15.4)
IMM GRANULOCYTES # BLD AUTO: 0 K/UL (ref 0–0.5)
IMM GRANULOCYTES NFR BLD AUTO: 1 % (ref 0–5)
LYMPHOCYTES # BLD: 0.7 K/UL (ref 0.5–4.6)
LYMPHOCYTES NFR BLD: 17 % (ref 13–44)
MCH RBC QN AUTO: 29.4 PG (ref 26.1–32.9)
MCHC RBC AUTO-ENTMCNC: 31.8 G/DL (ref 31.4–35)
MCV RBC AUTO: 92.5 FL (ref 79.6–97.8)
MONOCYTES # BLD: 0.4 K/UL (ref 0.1–1.3)
MONOCYTES NFR BLD: 10 % (ref 4–12)
NEUTS SEG # BLD: 2.8 K/UL (ref 1.7–8.2)
NEUTS SEG NFR BLD: 70 % (ref 43–78)
NRBC # BLD: 0 K/UL (ref 0–0.2)
PLATELET # BLD AUTO: 180 K/UL (ref 150–450)
PMV BLD AUTO: 10.6 FL (ref 9.4–12.3)
POTASSIUM SERPL-SCNC: 4.1 MMOL/L (ref 3.5–5.1)
PROT SERPL-MCNC: 6.5 G/DL (ref 6.3–8.2)
RBC # BLD AUTO: 4.15 M/UL (ref 4.05–5.25)
SODIUM SERPL-SCNC: 143 MMOL/L (ref 136–145)
VIT B12 SERPL-MCNC: 334 PG/ML (ref 193–986)
WBC # BLD AUTO: 4 K/UL (ref 4.3–11.1)

## 2019-07-19 PROCEDURE — 80053 COMPREHEN METABOLIC PANEL: CPT

## 2019-07-19 PROCEDURE — 82607 VITAMIN B-12: CPT

## 2019-07-19 PROCEDURE — 36415 COLL VENOUS BLD VENIPUNCTURE: CPT

## 2019-07-19 PROCEDURE — 82728 ASSAY OF FERRITIN: CPT

## 2019-07-19 PROCEDURE — 85025 COMPLETE CBC W/AUTO DIFF WBC: CPT

## 2019-07-23 ENCOUNTER — HOSPITAL ENCOUNTER (EMERGENCY)
Age: 69
Discharge: HOME | End: 2019-07-23
Attending: EMERGENCY MEDICINE
Payer: MEDICARE

## 2019-07-23 VITALS
HEART RATE: 62 BPM | SYSTOLIC BLOOD PRESSURE: 140 MMHG | WEIGHT: 119 LBS | OXYGEN SATURATION: 97 % | HEIGHT: 66 IN | BODY MASS INDEX: 19.13 KG/M2 | TEMPERATURE: 98.1 F | RESPIRATION RATE: 18 BRPM | DIASTOLIC BLOOD PRESSURE: 72 MMHG

## 2019-07-23 DIAGNOSIS — R10.13 ABDOMINAL PAIN, EPIGASTRIC: Primary | ICD-10-CM

## 2019-07-23 DIAGNOSIS — K21.9 GASTROESOPHAGEAL REFLUX DISEASE, ESOPHAGITIS PRESENCE NOT SPECIFIED: ICD-10-CM

## 2019-07-23 LAB
ALBUMIN SERPL-MCNC: 4.2 G/DL (ref 3.2–4.6)
ALBUMIN/GLOB SERPL: 1.4 {RATIO} (ref 1.2–3.5)
ALP SERPL-CCNC: 116 U/L (ref 50–136)
ALT SERPL-CCNC: 8 U/L (ref 12–65)
ANION GAP SERPL CALC-SCNC: 9 MMOL/L (ref 7–16)
AST SERPL-CCNC: 14 U/L (ref 15–37)
BASOPHILS # BLD: 0 K/UL (ref 0–0.2)
BASOPHILS NFR BLD: 0 % (ref 0–2)
BILIRUB SERPL-MCNC: 0.7 MG/DL (ref 0.2–1.1)
BUN SERPL-MCNC: 14 MG/DL (ref 8–23)
CALCIUM SERPL-MCNC: 9 MG/DL (ref 8.3–10.4)
CHLORIDE SERPL-SCNC: 104 MMOL/L (ref 98–107)
CO2 SERPL-SCNC: 29 MMOL/L (ref 21–32)
CREAT SERPL-MCNC: 1.07 MG/DL (ref 0.6–1)
DIFFERENTIAL METHOD BLD: ABNORMAL
EOSINOPHIL # BLD: 0 K/UL (ref 0–0.8)
EOSINOPHIL NFR BLD: 0 % (ref 0.5–7.8)
ERYTHROCYTE [DISTWIDTH] IN BLOOD BY AUTOMATED COUNT: 13.2 % (ref 11.9–14.6)
GLOBULIN SER CALC-MCNC: 2.9 G/DL (ref 2.3–3.5)
GLUCOSE SERPL-MCNC: 102 MG/DL (ref 65–100)
HCT VFR BLD AUTO: 40.7 % (ref 35.8–46.3)
HGB BLD-MCNC: 13.3 G/DL (ref 11.7–15.4)
IMM GRANULOCYTES # BLD AUTO: 0 K/UL (ref 0–0.5)
IMM GRANULOCYTES NFR BLD AUTO: 0 % (ref 0–5)
LIPASE SERPL-CCNC: 187 U/L (ref 73–393)
LYMPHOCYTES # BLD: 0.9 K/UL (ref 0.5–4.6)
LYMPHOCYTES NFR BLD: 12 % (ref 13–44)
MCH RBC QN AUTO: 29.4 PG (ref 26.1–32.9)
MCHC RBC AUTO-ENTMCNC: 32.7 G/DL (ref 31.4–35)
MCV RBC AUTO: 90 FL (ref 79.6–97.8)
MONOCYTES # BLD: 0.5 K/UL (ref 0.1–1.3)
MONOCYTES NFR BLD: 7 % (ref 4–12)
NEUTS SEG # BLD: 5.6 K/UL (ref 1.7–8.2)
NEUTS SEG NFR BLD: 80 % (ref 43–78)
NRBC # BLD: 0 K/UL (ref 0–0.2)
PLATELET # BLD AUTO: 191 K/UL (ref 150–450)
PMV BLD AUTO: 10.6 FL (ref 9.4–12.3)
POTASSIUM SERPL-SCNC: 4 MMOL/L (ref 3.5–5.1)
PROT SERPL-MCNC: 7.1 G/DL (ref 6.3–8.2)
RBC # BLD AUTO: 4.52 M/UL (ref 4.05–5.2)
SODIUM SERPL-SCNC: 142 MMOL/L (ref 136–145)
TROPONIN I SERPL-MCNC: <0.02 NG/ML (ref 0.02–0.05)
WBC # BLD AUTO: 7.1 K/UL (ref 4.3–11.1)

## 2019-07-23 PROCEDURE — 74011250637 HC RX REV CODE- 250/637: Performed by: EMERGENCY MEDICINE

## 2019-07-23 PROCEDURE — 99285 EMERGENCY DEPT VISIT HI MDM: CPT | Performed by: EMERGENCY MEDICINE

## 2019-07-23 PROCEDURE — 80053 COMPREHEN METABOLIC PANEL: CPT

## 2019-07-23 PROCEDURE — 83690 ASSAY OF LIPASE: CPT

## 2019-07-23 PROCEDURE — 74011250636 HC RX REV CODE- 250/636: Performed by: EMERGENCY MEDICINE

## 2019-07-23 PROCEDURE — 84484 ASSAY OF TROPONIN QUANT: CPT

## 2019-07-23 PROCEDURE — 96374 THER/PROPH/DIAG INJ IV PUSH: CPT | Performed by: EMERGENCY MEDICINE

## 2019-07-23 PROCEDURE — 74011000250 HC RX REV CODE- 250: Performed by: EMERGENCY MEDICINE

## 2019-07-23 PROCEDURE — 85025 COMPLETE CBC W/AUTO DIFF WBC: CPT

## 2019-07-23 PROCEDURE — 93005 ELECTROCARDIOGRAM TRACING: CPT | Performed by: EMERGENCY MEDICINE

## 2019-07-23 RX ORDER — SODIUM CHLORIDE 9 MG/ML
500 INJECTION, SOLUTION INTRAVENOUS ONCE
Status: COMPLETED | OUTPATIENT
Start: 2019-07-23 | End: 2019-07-23

## 2019-07-23 RX ORDER — ONDANSETRON 2 MG/ML
8 INJECTION INTRAMUSCULAR; INTRAVENOUS
Status: COMPLETED | OUTPATIENT
Start: 2019-07-23 | End: 2019-07-23

## 2019-07-23 RX ORDER — MAG HYDROX/ALUMINUM HYD/SIMETH 200-200-20
30 SUSPENSION, ORAL (FINAL DOSE FORM) ORAL
Status: COMPLETED | OUTPATIENT
Start: 2019-07-23 | End: 2019-07-23

## 2019-07-23 RX ORDER — LIDOCAINE HYDROCHLORIDE 20 MG/ML
15 SOLUTION OROPHARYNGEAL
Status: COMPLETED | OUTPATIENT
Start: 2019-07-23 | End: 2019-07-23

## 2019-07-23 RX ORDER — ONDANSETRON HYDROCHLORIDE 8 MG/1
8 TABLET, FILM COATED ORAL
Qty: 15 TAB | Refills: 0 | Status: SHIPPED | OUTPATIENT
Start: 2019-07-23

## 2019-07-23 RX ORDER — ONDANSETRON 2 MG/ML
4 INJECTION INTRAMUSCULAR; INTRAVENOUS
Status: DISCONTINUED | OUTPATIENT
Start: 2019-07-23 | End: 2019-07-23

## 2019-07-23 RX ADMIN — LIDOCAINE HYDROCHLORIDE 15 ML: 20 SOLUTION ORAL; TOPICAL at 18:28

## 2019-07-23 RX ADMIN — ALUMINUM HYDROXIDE, MAGNESIUM HYDROXIDE, AND SIMETHICONE 30 ML: 200; 200; 20 SUSPENSION ORAL at 18:27

## 2019-07-23 RX ADMIN — ONDANSETRON 8 MG: 2 INJECTION INTRAMUSCULAR; INTRAVENOUS at 18:28

## 2019-07-23 RX ADMIN — SODIUM CHLORIDE 500 ML: 900 INJECTION, SOLUTION INTRAVENOUS at 18:28

## 2019-07-23 NOTE — ED TRIAGE NOTES
Pt sts \"For the last two months I have been having stomach problems, and spitting up mucous and having stomach pains. I have diverticulitis and I am supposed to see my GI tomorrow\". Patient presents ambulatory to facility from home via by private vehicle. . Pt appears to be in mild distress. Patient complains of abdominal pain. chronic onset of symptoms was several months ago and has unchanged since that time. Pain localized to diffusely without radiation. Pain described as aching and rated as 8/10. The pain is exacerbated by food and is relieved by nothing. Patient also complains of anorexia, nausea and vomiting. Patient confirms hx of diverticulitis. Towards end of triage, pt localizes pain to epigastric region.  EKG done

## 2019-07-23 NOTE — DISCHARGE INSTRUCTIONS
Patient Education   continue current medications. Zofran every 6-8 hours as needed for nausea. Take over-the-counter extra strength Gaviscon 2-3 times daily as needed for breakthrough reflux symptoms. Follow-up at your scheduled GI appointment tomorrow for possible change from Nexium to another medication such as a Dexilant depending on your GI doctors recommendation. Indigestion (Dyspepsia or Heartburn): Care Instructions  Your Care Instructions  Sometimes it can be hard to pinpoint the cause of indigestion. (It is also called dyspepsia or heartburn.) Most cases of an upset stomach with bloating, burning, burping, and nausea are minor and go away within several hours. Home treatment and over-the-counter medicine often are able to control symptoms. But if you take medicine to relieve your indigestion without making diet and lifestyle changes, your symptoms are likely to return again and again. If you get indigestion often, it may be a sign of a more serious medical problem. Be sure to follow up with your doctor, who may want to do tests to be sure of the cause of your indigestion. Follow-up care is a key part of your treatment and safety. Be sure to make and go to all appointments, and call your doctor if you are having problems. It's also a good idea to know your test results and keep a list of the medicines you take. How can you care for yourself at home? · Your doctor may recommend over-the-counter medicine. For mild or occasional indigestion, antacids such as Gaviscon, Mylanta, Maalox, or Tums, may help. Be safe with medicines. Be careful when you take over-the-counter antacid medicines. Many of these medicines have aspirin in them. Read the label to make sure that you are not taking more than the recommended dose. Too much aspirin can be harmful. · Your doctor also may recommend over-the-counter acid reducers, such as Pepcid AC, Tagamet HB, Zantac 75, or Prilosec.  Read and follow all instructions on the label. If you use these medicines often, talk with your doctor. · Change your eating habits. ? It's best to eat several small meals instead of two or three large meals. ? After you eat, wait 2 to 3 hours before you lie down. ? Chocolate, mint, and alcohol can make GERD worse. ? Spicy foods, foods that have a lot of acid (like tomatoes and oranges), and coffee can make GERD symptoms worse in some people. If your symptoms are worse after you eat a certain food, you may want to stop eating that food to see if your symptoms get better. · Do not smoke or chew tobacco. Smoking can make GERD worse. If you need help quitting, talk to your doctor about stop-smoking programs and medicines. These can increase your chances of quitting for good. · If you have GERD symptoms at night, raise the head of your bed 6 to 8 inches. You can do this by putting the frame on blocks or placing a foam wedge under the head of your mattress. (Adding extra pillows does not work.)  · Do not wear tight clothing around your middle. · Lose weight if you need to. Losing just 5 to 10 pounds can help. · Do not take anti-inflammatory medicines, such as aspirin, ibuprofen (Advil, Motrin), or naproxen (Aleve). These can irritate the stomach. If you need a pain medicine, try acetaminophen (Tylenol), which does not cause stomach upset. When should you call for help? Call your doctor now or seek immediate medical care if:    · You have new or worse belly pain.     · You are vomiting.    Watch closely for changes in your health, and be sure to contact your doctor if:    · You have new or worse symptoms of indigestion.     · You have trouble or pain swallowing.     · You are losing weight.     · You do not get better as expected. Where can you learn more? Go to http://livan-brit.info/. Enter O624 in the search box to learn more about \"Indigestion (Dyspepsia or Heartburn): Care Instructions. \"  Current as of: November 7, 2018  Content Version: 12.1  © 2553-2230 [employer]. Care instructions adapted under license by [employer] (which disclaims liability or warranty for this information). If you have questions about a medical condition or this instruction, always ask your healthcare professional. [address] any warranty or liability for your use of this information. Patient Education        Abdominal Pain: Care Instructions  Your Care Instructions    Abdominal pain has many possible causes. Some aren't serious and get better on their own in a few days. Others need more testing and treatment. If your pain continues or gets worse, you need to be rechecked and may need more tests to find out what is wrong. You may need surgery to correct the problem. Don't ignore new symptoms, such as fever, nausea and vomiting, urination problems, pain that gets worse, and dizziness. These may be signs of a more serious problem. Your doctor may have recommended a follow-up visit in the next 8 to 12 hours. If you are not getting better, you may need more tests or treatment. The doctor has checked you carefully, but problems can develop later. If you notice any problems or new symptoms, get medical treatment right away. Follow-up care is a key part of your treatment and safety. Be sure to make and go to all appointments, and call your doctor if you are having problems. It's also a good idea to know your test results and keep a list of the medicines you take. How can you care for yourself at home? · Rest until you feel better. · To prevent dehydration, drink plenty of fluids, enough so that your urine is light yellow or clear like water. Choose water and other caffeine-free clear liquids until you feel better. If you have kidney, heart, or liver disease and have to limit fluids, talk with your doctor before you increase the amount of fluids you drink.   · If your stomach is upset, eat mild foods, such as rice, dry toast or crackers, bananas, and applesauce. Try eating several small meals instead of two or three large ones. · Wait until 48 hours after all symptoms have gone away before you have spicy foods, alcohol, and drinks that contain caffeine. · Do not eat foods that are high in fat. · Avoid anti-inflammatory medicines such as aspirin, ibuprofen (Advil, Motrin), and naproxen (Aleve). These can cause stomach upset. Talk to your doctor if you take daily aspirin for another health problem. When should you call for help? Call 911 anytime you think you may need emergency care. For example, call if:    · You passed out (lost consciousness).     · You pass maroon or very bloody stools.     · You vomit blood or what looks like coffee grounds.     · You have new, severe belly pain.    Call your doctor now or seek immediate medical care if:    · Your pain gets worse, especially if it becomes focused in one area of your belly.     · You have a new or higher fever.     · Your stools are black and look like tar, or they have streaks of blood.     · You have unexpected vaginal bleeding.     · You have symptoms of a urinary tract infection. These may include:  ? Pain when you urinate. ? Urinating more often than usual.  ? Blood in your urine.     · You are dizzy or lightheaded, or you feel like you may faint.    Watch closely for changes in your health, and be sure to contact your doctor if:    · You are not getting better after 1 day (24 hours). Where can you learn more? Go to http://livan-brit.info/. Enter J118 in the search box to learn more about \"Abdominal Pain: Care Instructions. \"  Current as of: September 23, 2018  Content Version: 12.1  © 7907-8758 [employer]. Care instructions adapted under license by [employer] (which disclaims liability or warranty for this information).  If you have questions about a medical condition or this instruction, always ask your healthcare professional. [place] [address] any warranty or liability for your use of this information.

## 2019-07-23 NOTE — ED NOTES
IV fluids are still infusion in patient at this time. This RN to complete discharge once fluids are complete. Patient has her cell phone and is calling her family member to pick her up.

## 2019-07-23 NOTE — ED PROVIDER NOTES
Patient with several months of epigastric and chest burning worse with eating and swallowing. Patient on Nexium twice daily and Carafate without relief. She has a GI appointment tomorrow. She denies any blood in her bowel movements or black tarry slimy bowel movements. She denies any urinary symptoms. No shortness of breath or fever cough cold congestion. The history is provided by the patient. Abdominal Pain    This is a chronic problem. Episode onset: everal months. The problem occurs daily. The problem has not changed since onset. The pain is associated with vomiting (ast vomited a few days ago). The pain is located in the epigastric region. The quality of the pain is burning. The pain is moderate. Associated symptoms include nausea, vomiting and chest pain. Pertinent negatives include no fever, no diarrhea, no hematochezia, no melena, no constipation, no dysuria, no frequency and no back pain. Associated symptoms comments: Dysphasia, pain with swallowing. The pain is worsened by eating. The pain is relieved by nothing. Past Medical History:   Diagnosis Date    Abnormal gait 7/5/2016    Acute serous otitis media of left ear     Anxiety     Bilateral carpal tunnel syndrome 7/5/2016    Chronic abdominal pain 7/5/2016    Dementia due to Parkinson's disease without behavioral disturbance ([facility] [address] 7/5/2016    Depression     managed by meds    Diverticulitis     Gastritis     GERD (gastroesophageal reflux disease)     Heart murmur     [facility]  last echo10/2015    Hypercholesterolemia     Hypertension     does not require medication    Nausea & vomiting     Neuropathic pain 7/5/2016    Nontoxic multinodular goiter 7/5/2016    Orthostatic hypotension     managed by meds    Parkinson's disease ([facility] [address]     dx 2014- sometimes requires walker or wheelchair when tired    Peripheral neuropathy     Syncope 10/10/2015    Urinary frequency 7/5/2016    Vaginal bleeding     Weight loss, unintentional     50 lbs over 2 yrs       Past Surgical History:   Procedure Laterality Date    COLONOSCOPY N/A 8/6/2018    COLONOSCOPY/ 19 performed [facility] at [facility]COPY    COLONOSCOPY N/A 4/16/2019    COLONOSCOPY/ 18 performed by [facility]ally Rangel [facility] at [facility] ENDOSCOPY    HX APPENDECTOMY  1974    HX CHOLECYSTECTOMY  2001    HX TUBAL LIGATION  1974         Family History:   Problem Relation Age of Onset    Alzheimer Mother     Diabetes Father     Cancer Father         prostate    Stroke Father     Diabetes Sister     Diabetes Brother     Cancer Brother         kidney and prostate    Sickle Cell Anemia Son         2 sons     Breast Cancer Neg Hx        Social History     Socioeconomic History    Marital status:      Spouse name: Not on file    Number of children: Not on file    Years of education: Not on file    Highest education level: Not on file   Occupational History    Not on file   Social Needs    Financial resource strain: Not on file    Food insecurity:     Worry: Not on file     Inability: Not on file    Transportation needs:     Medical: Not on file     Non-medical: Not on file   Tobacco Use    Smoking status: Never Smoker    Smokeless tobacco: Never Used   Substance and Sexual Activity    Alcohol use: No    Drug use: No    Sexual activity: Not on file   Lifestyle    Physical activity:     Days per week: Not on file     Minutes per session: Not on file    Stress: Not on file   Relationships    Social connections:     Talks on phone: Not on file     Gets together: Not on file     Attends Oriental orthodox service: Not on file     Active member of club or organization: Not on file     Attends meetings of clubs or organizations: Not on file     Relationship status: Not on file    Intimate partner violence:     Fear of current or ex partner: Not on file     Emotionally abused: Not on file     Physically abused: Not on file     Forced sexual activity: Not on file   Other Topics Concern    Not on file   Social History Narrative    Not on file         ALLERGIES: Flagyl [metronidazole]; Nsaids (non-steroidal anti-inflammatory drug); Peanut; Aspirin; Contrast agent [iodine]; Gabapentin; Gluten; Iodinated contrast- oral and iv dye; Milk containing products; Morphine; Omnipaque rediflo [iohexol]; Red dye; and Wheat    Review of Systems   Constitutional: Negative for fever. HENT: Negative for congestion and rhinorrhea. Respiratory: Negative for cough and shortness of breath. Cardiovascular: Positive for chest pain. Negative for leg swelling. Gastrointestinal: Positive for abdominal pain, nausea and vomiting. Negative for constipation, diarrhea, hematochezia and melena. Endocrine: Negative for polydipsia and polyuria. Genitourinary: Negative for dysuria and frequency. Musculoskeletal: Negative for back pain. Skin: Negative for rash. Neurological: Negative for weakness and numbness. Psychiatric/Behavioral: The patient is not nervous/anxious. Vitals:    07/23/19 1716   BP: 136/73   Pulse: 77   Resp: 16   Temp: 98 °F (36.7 °C)   SpO2: 98%   Weight: 54 kg (119 lb)   Height: 5' 6\" (1.676 m)            Physical Exam   Constitutional: She appears well-developed and well-nourished. She does not appear ill. No distress. HENT:   Head: Normocephalic. Eyes: Pupils are equal, round, and reactive to light. Cardiovascular: Normal rate and regular rhythm. Murmur heard. Pulmonary/Chest: Effort normal and breath sounds normal.   Abdominal: Soft. She exhibits no distension and no mass. There is tenderness (mild) in the epigastric area. There is no rebound and no guarding. Musculoskeletal: She exhibits no edema, tenderness or deformity. Cogwheel rigidity present, Parkinson tremor present. Slow motor movements. Lymphadenopathy:     She has no cervical adenopathy. Neurological: She is alert.  She exhibits abnormal muscle tone (cogwheel rigidity, slow motor movements,Limited range of motion). Skin: Skin is warm and dry. Nursing note and vitals reviewed. MDM  Number of Diagnoses or Management Options  Diagnosis management comments: Rule out MI and I believe a single troponin will be suspicion given the prolonged duration of symptoms. Symptoms sound GI related and likely represent acid reflux esophagitis or ulcer. Rule out pancreatitis with lipase. Patient has had multiple CT scans in the past.  I do not believe CT scan or further cancer causing risk of  radiation are indicated for this chronic issue.        Amount and/or Complexity of Data Reviewed  Clinical lab tests: ordered and reviewed (Results for orders placed or performed during the hospital encounter of 07/23/19  -CBC WITH AUTOMATED DIFF       Result                      Value             Ref Range           WBC                         7.1               4.3 - 11.1 K*       RBC                         4.52              4.05 - 5.2 M*       HGB                         13.3              11.7 - 15.4 *       HCT                         40.7              35.8 - 46.3 %       MCV                         90.0              79.6 - 97.8 *       MCH                         29.4              26.1 - 32.9 *       MCHC                        32.7              31.4 - 35.0 *       RDW                         13.2              11.9 - 14.6 %       PLATELET                    191               150 - 450 K/*       MPV                         10.6              9.4 - 12.3 FL       ABSOLUTE NRBC               0.00              0.0 - 0.2 K/*       DF                          AUTOMATED                             NEUTROPHILS                 80 (H)            43 - 78 %           LYMPHOCYTES                 12 (L)            13 - 44 %           MONOCYTES                   7                 4.0 - 12.0 %        EOSINOPHILS                 0 (L)             0.5 - 7.8 %         BASOPHILS                   0 0.0 - 2.0 %         IMMATURE GRANULOCYTES       0                 0.0 - 5.0 %         ABS. NEUTROPHILS            5.6               1.7 - 8.2 K/*       ABS. LYMPHOCYTES            0.9               0.5 - 4.6 K/*       ABS. MONOCYTES              0.5               0.1 - 1.3 K/*       ABS. EOSINOPHILS            0.0               0.0 - 0.8 K/*       ABS. BASOPHILS              0.0               0.0 - 0.2 K/*       ABS. IMM. GRANS.            0.0               0.0 - 0.5 K/*  -METABOLIC PANEL, COMPREHENSIVE       Result                      Value             Ref Range           Sodium                      142               136 - 145 mm*       Potassium                   4.0               3.5 - 5.1 mm*       Chloride                    104               98 - 107 mmo*       CO2                         29                21 - 32 mmol*       Anion gap                   9                 7 - 16 mmol/L       Glucose                     102 (H)           65 - 100 mg/*       BUN                         14                8 - 23 MG/DL        Creatinine                  1. 07 (H)          0.6 - 1.0 MG*       GFR est AA                  >60               >60 ml/min/1*       GFR est non-AA              54 (L)            >60 ml/min/1*       Calcium                     9.0               8.3 - 10.4 M*       Bilirubin, total            0.7               0.2 - 1.1 MG*       ALT (SGPT)                  8 (L)             12 - 65 U/L         AST (SGOT)                  14 (L)            15 - 37 U/L         Alk.  phosphatase            116               50 - 136 U/L        Protein, total              7.1               6.3 - 8.2 g/*       Albumin                     4.2               3.2 - 4.6 g/*       Globulin                    2.9               2.3 - 3.5 g/*       A-G Ratio                   1.4               1.2 - 3.5      -LIPASE       Result                      Value             Ref Range           Lipase 187               73 - 393 U/L   -TROPONIN I       Result                      Value             Ref Range           Troponin-I, Qt.             <0.02 (L)         0.02 - 0.05 *  )           Procedures

## 2019-07-24 LAB
ATRIAL RATE: 81 BPM
CALCULATED P AXIS, ECG09: 71 DEGREES
CALCULATED R AXIS, ECG10: 31 DEGREES
CALCULATED T AXIS, ECG11: 6 DEGREES
DIAGNOSIS, 93000: NORMAL
P-R INTERVAL, ECG05: 150 MS
Q-T INTERVAL, ECG07: 382 MS
QRS DURATION, ECG06: 80 MS
QTC CALCULATION (BEZET), ECG08: 443 MS
VENTRICULAR RATE, ECG03: 81 BPM

## 2019-07-24 NOTE — ED NOTES
I have reviewed discharge instructions with the patient. The patient verbalized understanding. Patient left ED via Discharge Method: ambulatory to Home with son Harley Burt and granddaughter Mrevin Moore. Opportunity for questions and clarification provided. Patient given 1 scripts. To continue your aftercare when you leave the hospital, you may receive an automated call from our care team to check in on how you are doing. This is a free service and part of our promise to provide the best care and service to meet your aftercare needs.  If you have questions, or wish to unsubscribe from this service please call [phone]. Thank you for Choosing our [facility] Emergency Department.

## 2020-01-11 ENCOUNTER — HOSPITAL ENCOUNTER (EMERGENCY)
Age: 70
Discharge: HOME | End: 2020-01-11
Attending: EMERGENCY MEDICINE
Payer: MEDICARE

## 2020-01-11 VITALS
BODY MASS INDEX: 22.5 KG/M2 | TEMPERATURE: 98.2 F | HEIGHT: 66 IN | RESPIRATION RATE: 16 BRPM | WEIGHT: 140 LBS | HEART RATE: 69 BPM | DIASTOLIC BLOOD PRESSURE: 96 MMHG | OXYGEN SATURATION: 98 % | SYSTOLIC BLOOD PRESSURE: 151 MMHG

## 2020-01-11 DIAGNOSIS — I95.9 HYPOTENSION, UNSPECIFIED HYPOTENSION TYPE: Primary | ICD-10-CM

## 2020-01-11 LAB
ALBUMIN SERPL-MCNC: 3.5 G/DL (ref 3.2–4.6)
ALBUMIN/GLOB SERPL: 1 {RATIO} (ref 1.2–3.5)
ALP SERPL-CCNC: 71 U/L (ref 50–136)
ALT SERPL-CCNC: 10 U/L (ref 12–65)
ANION GAP SERPL CALC-SCNC: 4 MMOL/L (ref 7–16)
AST SERPL-CCNC: 17 U/L (ref 15–37)
ATRIAL RATE: 258 BPM
BASOPHILS # BLD: 0.1 K/UL (ref 0–0.2)
BASOPHILS NFR BLD: 1 % (ref 0–2)
BILIRUB SERPL-MCNC: 0.5 MG/DL (ref 0.2–1.1)
BUN SERPL-MCNC: 19 MG/DL (ref 8–23)
CALCIUM SERPL-MCNC: 8.9 MG/DL (ref 8.3–10.4)
CALCULATED R AXIS, ECG10: 37 DEGREES
CALCULATED T AXIS, ECG11: -13 DEGREES
CARBAMAZEPINE SERPL-MCNC: <0.5 UG/ML (ref 4–12)
CHLORIDE SERPL-SCNC: 109 MMOL/L (ref 98–107)
CO2 SERPL-SCNC: 31 MMOL/L (ref 21–32)
CREAT SERPL-MCNC: 1.3 MG/DL (ref 0.6–1)
DIAGNOSIS, 93000: NORMAL
DIFFERENTIAL METHOD BLD: ABNORMAL
EOSINOPHIL # BLD: 0.2 K/UL (ref 0–0.8)
EOSINOPHIL NFR BLD: 3 % (ref 0.5–7.8)
ERYTHROCYTE [DISTWIDTH] IN BLOOD BY AUTOMATED COUNT: 13 % (ref 11.9–14.6)
GLOBULIN SER CALC-MCNC: 3.5 G/DL (ref 2.3–3.5)
GLUCOSE SERPL-MCNC: 156 MG/DL (ref 65–100)
HCT VFR BLD AUTO: 37.5 % (ref 35.8–46.3)
HGB BLD-MCNC: 11.1 G/DL (ref 11.7–15.4)
IMM GRANULOCYTES # BLD AUTO: 0.1 K/UL (ref 0–0.5)
IMM GRANULOCYTES NFR BLD AUTO: 1 % (ref 0–5)
LYMPHOCYTES # BLD: 1.2 K/UL (ref 0.5–4.6)
LYMPHOCYTES NFR BLD: 18 % (ref 13–44)
MCH RBC QN AUTO: 31 PG (ref 26.1–32.9)
MCHC RBC AUTO-ENTMCNC: 29.6 G/DL (ref 31.4–35)
MCV RBC AUTO: 104.7 FL (ref 79.6–97.8)
MONOCYTES # BLD: 0.7 K/UL (ref 0.1–1.3)
MONOCYTES NFR BLD: 10 % (ref 4–12)
NEUTS SEG # BLD: 4.2 K/UL (ref 1.7–8.2)
NEUTS SEG NFR BLD: 67 % (ref 43–78)
NRBC # BLD: 0 K/UL (ref 0–0.2)
PLATELET # BLD AUTO: 74 K/UL (ref 150–450)
PLATELET COMMENTS,PCOM: ABNORMAL
PMV BLD AUTO: 11.6 FL (ref 9.4–12.3)
POTASSIUM SERPL-SCNC: 3.9 MMOL/L (ref 3.5–5.1)
PROT SERPL-MCNC: 7 G/DL (ref 6.3–8.2)
Q-T INTERVAL, ECG07: 372 MS
QRS DURATION, ECG06: 72 MS
QTC CALCULATION (BEZET), ECG08: 412 MS
RBC # BLD AUTO: 3.58 M/UL (ref 4.05–5.2)
RBC MORPH BLD: ABNORMAL
SODIUM SERPL-SCNC: 144 MMOL/L (ref 136–145)
TROPONIN I SERPL-MCNC: <0.02 NG/ML (ref 0.02–0.05)
VENTRICULAR RATE, ECG03: 74 BPM
WBC # BLD AUTO: 6.5 K/UL (ref 4.3–11.1)

## 2020-01-11 PROCEDURE — 96376 TX/PRO/DX INJ SAME DRUG ADON: CPT | Performed by: EMERGENCY MEDICINE

## 2020-01-11 PROCEDURE — 93005 ELECTROCARDIOGRAM TRACING: CPT | Performed by: EMERGENCY MEDICINE

## 2020-01-11 PROCEDURE — 74011250636 HC RX REV CODE- 250/636: Performed by: EMERGENCY MEDICINE

## 2020-01-11 PROCEDURE — 80053 COMPREHEN METABOLIC PANEL: CPT

## 2020-01-11 PROCEDURE — 74011250637 HC RX REV CODE- 250/637: Performed by: EMERGENCY MEDICINE

## 2020-01-11 PROCEDURE — 96374 THER/PROPH/DIAG INJ IV PUSH: CPT | Performed by: EMERGENCY MEDICINE

## 2020-01-11 PROCEDURE — 84484 ASSAY OF TROPONIN QUANT: CPT

## 2020-01-11 PROCEDURE — 96361 HYDRATE IV INFUSION ADD-ON: CPT | Performed by: EMERGENCY MEDICINE

## 2020-01-11 PROCEDURE — 80156 ASSAY CARBAMAZEPINE TOTAL: CPT

## 2020-01-11 PROCEDURE — 99284 EMERGENCY DEPT VISIT MOD MDM: CPT | Performed by: EMERGENCY MEDICINE

## 2020-01-11 PROCEDURE — 85025 COMPLETE CBC W/AUTO DIFF WBC: CPT

## 2020-01-11 RX ORDER — ONDANSETRON 2 MG/ML
INJECTION INTRAMUSCULAR; INTRAVENOUS
Status: ACTIVE
Start: 2020-01-11 | End: 2020-01-12

## 2020-01-11 RX ORDER — LORAZEPAM 1 MG/1
1 TABLET ORAL
Status: COMPLETED | OUTPATIENT
Start: 2020-01-11 | End: 2020-01-11

## 2020-01-11 RX ORDER — ONDANSETRON 2 MG/ML
4 INJECTION INTRAMUSCULAR; INTRAVENOUS
Status: COMPLETED | OUTPATIENT
Start: 2020-01-11 | End: 2020-01-11

## 2020-01-11 RX ORDER — ONDANSETRON 2 MG/ML
INJECTION INTRAMUSCULAR; INTRAVENOUS
Status: ACTIVE
Start: 2020-01-11 | End: 2020-01-11

## 2020-01-11 RX ORDER — LORAZEPAM 1 MG/1
TABLET ORAL
Status: ACTIVE
Start: 2020-01-11 | End: 2020-01-12

## 2020-01-11 RX ORDER — SODIUM CHLORIDE, SODIUM LACTATE, POTASSIUM CHLORIDE, CALCIUM CHLORIDE 600; 310; 30; 20 MG/100ML; MG/100ML; MG/100ML; MG/100ML
200 INJECTION, SOLUTION INTRAVENOUS CONTINUOUS
Status: DISCONTINUED | OUTPATIENT
Start: 2020-01-11 | End: 2020-01-11 | Stop reason: HOSPADM

## 2020-01-11 RX ADMIN — ONDANSETRON 4 MG: 2 INJECTION INTRAMUSCULAR; INTRAVENOUS at 10:41

## 2020-01-11 RX ADMIN — SODIUM CHLORIDE, SODIUM LACTATE, POTASSIUM CHLORIDE, AND CALCIUM CHLORIDE 200 ML/HR: 600; 310; 30; 20 INJECTION, SOLUTION INTRAVENOUS at 10:41

## 2020-01-11 RX ADMIN — ONDANSETRON 4 MG: 2 INJECTION INTRAMUSCULAR; INTRAVENOUS at 14:57

## 2020-01-11 RX ADMIN — LORAZEPAM 1 MG: 1 TABLET ORAL at 15:00

## 2020-01-11 NOTE — DISCHARGE INSTRUCTIONS
Slow position changes  Take your medication for hypotension (she states it starts with an \"M\" and may be Midodrine  Make certain you are taking in adequate liquids

## 2020-01-11 NOTE — ED NOTES
I have reviewed discharge instructions with the patient. The patient verbalized understanding. Patient left ED via Discharge Method: wheelchair to Home with daughter). Opportunity for questions and clarification provided. Patient given 0 scripts. To continue your aftercare when you leave the hospital, you may receive an automated call from our care team to check in on how you are doing. This is a free service and part of our promise to provide the best care and service to meet your aftercare needs.  If you have questions, or wish to unsubscribe from this service please call [phone]. Thank you for Choosing our [facility] Emergency Department.

## 2020-01-11 NOTE — ED NOTES
Brady discharge medication and plan additionally with her daughter. Daughter is a hospice nurse.   States and confirms that she does have midodrine at home    Cantu is requesting additional Xanax and have informed her that this is best provided by primary care provider physician also wishes that we increase her pain medication once again with her degree of baseline debility from Parkinson's do not feel as though without acute injury or other event that medicines beyond her available tramadol would be appropriate

## 2020-01-11 NOTE — ED PROVIDER NOTES
Patient is here with some weakness. She also has some nausea but has not vomited. History of labile blood pressure with periods of both hyper and hypotension. She denies any chest pain. She has had no fevers or chills. No cough or sputum. Compliant with her medications. He does have some baseline issues related to her Parkinson's and mild dementia changes as well. Our department she has no acute change and actually has returned to her baseline with persistence of this over several hour. The history is provided by the patient and a relative. Hypotension    This is a recurrent problem. Pertinent negatives include no confusion, no somnolence, no unresponsiveness and no weakness. Past Medical History:   Diagnosis Date    Abnormal gait 7/5/2016    Acute serous otitis media of left ear     Anxiety     Bilateral carpal tunnel syndrome 7/5/2016    Chronic abdominal pain 7/5/2016    Dementia due to Parkinson's disease without behavioral disturbance ([facility] [address] 7/5/2016    Depression     managed by meds    Diverticulitis     Gastritis     GERD (gastroesophageal reflux disease)     Heart murmur     follow[facility] by [facility] cardiology.  last echo10/2015    Hypercholesterolemia     Hypertension     does not require medication    Nausea & vomiting     Neuropathic pain 7/5/2016    Nontoxic multinodular goiter 7/5/2016    Orthostatic hypotension     managed by meds    Parkinson's disease ([facility] [address]     dx 2014- sometimes requires walker or wheelchair when tired    Peripheral neuropathy     Syncope 10/10/2015    Urinary frequency 7/5/2016    Vaginal bleeding     Weight loss, unintentional     50 lbs over 2 yrs       Past Surgical History:   Procedure Laterality Date    COLONOSCOPY N/A 8/6/2018    COLONOSCOPY/ 19 performed by Anna Rinaldi MD at [address] [place] [address] N/A 4/16/2019    COLONOSCOPY/ 18 performed by Nehemias Ngo MD at [address] [place] [address] Dr Aly WELCH CHOLECYSTECTOMY  2001    HX TUBAL LIGATION  1974         Family History:   Problem Relation Age of Onset    Alzheimer Mother     Diabetes Father     Cancer Father         prostate    Stroke Father     Diabetes Sister     Diabetes Brother     Cancer Brother         kidney and prostate    Sickle Cell Anemia Son         2 sons     Breast Cancer Neg Hx        Social History     Socioeconomic History    Marital status:      Spouse name: Not on file    Number of children: Not on file    Years of education: Not on file    Highest education level: Not on file   Occupational History    Not on file   Social Needs    Financial resource strain: Not on file    Food insecurity:     Worry: Not on file     Inability: Not on file    Transportation needs:     Medical: Not on file     Non-medical: Not on file   Tobacco Use    Smoking status: Never Smoker    Smokeless tobacco: Never Used   Substance and Sexual Activity    Alcohol use: No    Drug use: No    Sexual activity: Not on file   Lifestyle    Physical activity:     Days per week: Not on file     Minutes per session: Not on file    Stress: Not on file   Relationships    Social connections:     Talks on phone: Not on file     Gets together: Not on file     Attends Religion service: Not on file     Active member of club or organization: Not on file     Attends meetings of clubs or organizations: Not on file     Relationship status: Not on file    Intimate partner violence:     Fear of current or ex partner: Not on file     Emotionally abused: Not on file     Physically abused: Not on file     Forced sexual activity: Not on file   Other Topics Concern    Not on file   Social History Narrative    Not on file         ALLERGIES: Flagyl [metronidazole]; Nsaids (non-steroidal anti-inflammatory drug); Peanut; Aspirin; Contrast agent [iodine]; Gabapentin; Gluten;  Iodinated contrast media; Milk containing products; Morphine; Omnipaque rediflo [iohexol]; Red dye; and Wheat    Review of Systems   Constitutional: Positive for fatigue. Negative for chills and fever. HENT: Negative. Respiratory: Negative for cough and shortness of breath. Cardiovascular: Negative. Gastrointestinal: Negative for abdominal pain and blood in stool. Neurological: Negative for weakness. Baseline Parkinson changes   Psychiatric/Behavioral: Positive for dysphoric mood. Negative for confusion. All other systems reviewed and are negative. Vitals:    01/11/20 0938   BP: 134/60   Pulse: 74   Resp: 16   Temp: 98.2 °F (36.8 °C)   SpO2: 100%   Weight: 63.5 kg (140 lb)   Height: 5' 6\" (1.676 m)            Physical Exam  Vitals signs and nursing note reviewed. Constitutional:       General: She is not in acute distress. Appearance: She is well-developed. HENT:      Head: Atraumatic. Eyes:      General: No scleral icterus. Neck:      Musculoskeletal: Neck supple. Cardiovascular:      Rate and Rhythm: Normal rate. Pulses: Normal pulses. Heart sounds: Murmur present. Pulmonary:      Effort: Pulmonary effort is normal. No respiratory distress. Breath sounds: Normal breath sounds. No wheezing or rhonchi. Abdominal:      General: Abdomen is flat. Bowel sounds are normal.      Palpations: Abdomen is soft. Tenderness: There is no tenderness. There is no right CVA tenderness, left CVA tenderness or guarding. Musculoskeletal:      Right lower leg: No edema. Left lower leg: No edema. Skin:     General: Skin is warm and dry. Coloration: Skin is not jaundiced. Findings: No erythema. Neurological:      Mental Status: She is alert and oriented to person, place, and time. Mental status is at baseline. Sensory: No sensory deficit. Psychiatric:         Mood and Affect: Mood is anxious. Affect is flat. Affect is not labile. Behavior: Behavior normal. Behavior is not agitated or aggressive. Thought Content:  Thought content normal.          MDM  Number of Diagnoses or Management Options  Hypotension, unspecified hypotension type:   Diagnosis management comments: Patient with history of similar in the past.  At present hemoglobin is stable electrolytes significant acute pathology other than does appear to be mildly volume depleted. She is given gentle hydration in our department and has returned to her baseline. Recheck of blood pressures show no orthostasis. Patient has a daughter who is hospice registered nurse and can follow her closely. Patient is to return with any worsening. Amount and/or Complexity of Data Reviewed  Clinical lab tests: ordered and reviewed    Risk of Complications, Morbidity, and/or Mortality  Presenting problems: moderate  Diagnostic procedures: low  Management options: moderate    Patient Progress  Patient progress: stable         Procedures      Recent Results (from the past 12 hour(s))   METABOLIC PANEL, COMPREHENSIVE    Collection Time: 01/11/20  9:48 AM   Result Value Ref Range    Sodium 144 136 - 145 mmol/L    Potassium 3.9 3.5 - 5.1 mmol/L    Chloride 109 (H) 98 - 107 mmol/L    CO2 31 21 - 32 mmol/L    Anion gap 4 (L) 7 - 16 mmol/L    Glucose 156 (H) 65 - 100 mg/dL    BUN 19 8 - 23 MG/DL    Creatinine 1.30 (H) 0.6 - 1.0 MG/DL    GFR est AA 52 (L) >60 ml/min/1.73m2    GFR est non-AA 43 (L) >60 ml/min/1.73m2    Calcium 8.9 8.3 - 10.4 MG/DL    Bilirubin, total 0.5 0.2 - 1.1 MG/DL    ALT (SGPT) 10 (L) 12 - 65 U/L    AST (SGOT) 17 15 - 37 U/L    Alk.  phosphatase 71 50 - 136 U/L    Protein, total 7.0 6.3 - 8.2 g/dL    Albumin 3.5 3.2 - 4.6 g/dL    Globulin 3.5 2.3 - 3.5 g/dL    A-G Ratio 1.0 (L) 1.2 - 3.5     TROPONIN I    Collection Time: 01/11/20  9:48 AM   Result Value Ref Range    Troponin-I, Qt. <0.02 (L) 0.02 - 0.05 NG/ML   EKG, 12 LEAD, INITIAL    Collection Time: 01/11/20  9:55 AM   Result Value Ref Range    Ventricular Rate 74 BPM    Atrial Rate 258 BPM    QRS Duration 72 ms    Q-T Interval 372 ms    QTC Calculation (Bezet) 412 ms    Calculated R Axis 37 degrees    Calculated T Axis -13 degrees    Diagnosis       !!! Poor data quality, interpretation may be adversely affected  Sinus rhtyhm with Premature ventricular complexes  Nonspecific T wave abnormality  Abnormal ECG  When compared with ECG of 23-JUL-2019 17:21,  Poor data quality in current ECG precludes serial comparison  Confirmed by ST RUSTAM ROMERO MD (), BREANA G ([id number]) on 1/11/2020 12:09:12 PM     CBC WITH AUTOMATED DIFF    Collection Time: 01/11/20 10:34 AM   Result Value Ref Range    WBC 6.5 4.3 - 11.1 K/uL    RBC 3.58 (L) 4.05 - 5.2 M/uL    HGB 11.1 (L) 11.7 - 15.4 g/dL    HCT 37.5 35.8 - 46.3 %    .7 (H) 79.6 - 97.8 FL    MCH 31.0 26.1 - 32.9 PG    MCHC 29.6 (L) 31.4 - 35.0 g/dL    RDW 13.0 11.9 - 14.6 %    PLATELET 74 (L) 640 - 450 K/uL    MPV 11.6 9.4 - 12.3 FL    ABSOLUTE NRBC 0.00 0.0 - 0.2 K/uL    NEUTROPHILS 67 43 - 78 %    LYMPHOCYTES 18 13 - 44 %    MONOCYTES 10 4.0 - 12.0 %    EOSINOPHILS 3 0.5 - 7.8 %    BASOPHILS 1 0.0 - 2.0 %    IMMATURE GRANULOCYTES 1 0.0 - 5.0 %    ABS. NEUTROPHILS 4.2 1.7 - 8.2 K/UL    ABS. LYMPHOCYTES 1.2 0.5 - 4.6 K/UL    ABS. MONOCYTES 0.7 0.1 - 1.3 K/UL    ABS. EOSINOPHILS 0.2 0.0 - 0.8 K/UL    ABS. BASOPHILS 0.1 0.0 - 0.2 K/UL    ABS. IMM.  GRANS. 0.1 0.0 - 0.5 K/UL    RBC COMMENTS NORMOCYTIC/NORMOCHROMIC      PLATELET COMMENTS DECREASED      DF AUTOMATED

## 2020-01-11 NOTE — ED TRIAGE NOTES
EMS called to home for fall. Pt had hypotension and dizziness. Pt has been dizzy since last night. When walking to couch she fell in floor. No injuries. bp initially 80/40, hr 60s, bgl 199.  12 lead NSR. No interventions by EMS. bp now 90/60.

## 2020-01-13 ENCOUNTER — HOSPITAL ENCOUNTER (OUTPATIENT)
Dept: LAB | Age: 70
Discharge: HOME | End: 2020-01-13
Payer: MEDICARE

## 2020-01-13 DIAGNOSIS — E53.8 VITAMIN B12 DEFICIENCY: ICD-10-CM

## 2020-01-13 DIAGNOSIS — D50.8 OTHER IRON DEFICIENCY ANEMIA: ICD-10-CM

## 2020-01-13 DIAGNOSIS — E55.9 VITAMIN D DEFICIENCY: ICD-10-CM

## 2020-01-13 LAB
ALBUMIN SERPL-MCNC: 3.9 G/DL (ref 3.2–4.6)
ALBUMIN/GLOB SERPL: 1.1 {RATIO} (ref 1.2–3.5)
ALP SERPL-CCNC: 76 U/L (ref 50–136)
ALT SERPL-CCNC: 9 U/L (ref 12–65)
ANION GAP SERPL CALC-SCNC: 3 MMOL/L (ref 7–16)
AST SERPL-CCNC: 23 U/L (ref 15–37)
BASOPHILS # BLD: 0 K/UL (ref 0–0.2)
BASOPHILS NFR BLD: 1 % (ref 0–2)
BILIRUB SERPL-MCNC: 0.5 MG/DL (ref 0.2–1.1)
BUN SERPL-MCNC: 15 MG/DL (ref 8–23)
CALCIUM SERPL-MCNC: 8.8 MG/DL (ref 8.3–10.4)
CHLORIDE SERPL-SCNC: 106 MMOL/L (ref 98–107)
CO2 SERPL-SCNC: 31 MMOL/L (ref 21–32)
CREAT SERPL-MCNC: 1.19 MG/DL (ref 0.6–1)
DIFFERENTIAL METHOD BLD: ABNORMAL
EOSINOPHIL # BLD: 0.1 K/UL (ref 0–0.8)
EOSINOPHIL NFR BLD: 2 % (ref 0.5–7.8)
ERYTHROCYTE [DISTWIDTH] IN BLOOD BY AUTOMATED COUNT: 12.5 % (ref 11.9–14.6)
FERRITIN SERPL-MCNC: 894 NG/ML (ref 8–388)
GLOBULIN SER CALC-MCNC: 3.5 G/DL (ref 2.3–3.5)
GLUCOSE SERPL-MCNC: 117 MG/DL (ref 65–100)
HCT VFR BLD AUTO: 43.2 % (ref 35.8–46.3)
HGB BLD-MCNC: 14 G/DL (ref 11.7–15.4)
IMM GRANULOCYTES # BLD AUTO: 0.1 K/UL (ref 0–0.5)
IMM GRANULOCYTES NFR BLD AUTO: 1 % (ref 0–5)
LYMPHOCYTES # BLD: 0.9 K/UL (ref 0.5–4.6)
LYMPHOCYTES NFR BLD: 12 % (ref 13–44)
MCH RBC QN AUTO: 30.4 PG (ref 26.1–32.9)
MCHC RBC AUTO-ENTMCNC: 32.4 G/DL (ref 31.4–35)
MCV RBC AUTO: 93.9 FL (ref 79.6–97.8)
MONOCYTES # BLD: 0.4 K/UL (ref 0.1–1.3)
MONOCYTES NFR BLD: 6 % (ref 4–12)
NEUTS SEG # BLD: 5.5 K/UL (ref 1.7–8.2)
NEUTS SEG NFR BLD: 78 % (ref 43–78)
NRBC # BLD: 0 K/UL (ref 0–0.2)
PLATELET # BLD AUTO: 184 K/UL (ref 150–450)
PMV BLD AUTO: 10.5 FL (ref 9.4–12.3)
POTASSIUM SERPL-SCNC: 4.1 MMOL/L (ref 3.5–5.1)
PROT SERPL-MCNC: 7.4 G/DL (ref 6.3–8.2)
RBC # BLD AUTO: 4.6 M/UL (ref 4.05–5.25)
SODIUM SERPL-SCNC: 140 MMOL/L (ref 136–145)
VIT B12 SERPL-MCNC: 694 PG/ML (ref 193–986)
WBC # BLD AUTO: 7 K/UL (ref 4.3–11.1)

## 2020-01-13 PROCEDURE — 82607 VITAMIN B-12: CPT

## 2020-01-13 PROCEDURE — 36415 COLL VENOUS BLD VENIPUNCTURE: CPT

## 2020-01-13 PROCEDURE — 82728 ASSAY OF FERRITIN: CPT

## 2020-01-13 PROCEDURE — 85025 COMPLETE CBC W/AUTO DIFF WBC: CPT

## 2020-01-13 PROCEDURE — 82306 VITAMIN D 25 HYDROXY: CPT

## 2020-01-13 PROCEDURE — 80053 COMPREHEN METABOLIC PANEL: CPT

## 2020-01-14 LAB — 25(OH)D3+25(OH)D2 SERPL-MCNC: 28.5 NG/ML (ref 30–100)

## 2020-05-12 ENCOUNTER — APPOINTMENT (OUTPATIENT)
Dept: GENERAL RADIOLOGY | Age: 70
End: 2020-05-12
Payer: MEDICARE

## 2020-05-12 ENCOUNTER — HOSPITAL ENCOUNTER (EMERGENCY)
Age: 70
Discharge: HOME | End: 2020-05-12
Payer: MEDICARE

## 2020-05-12 VITALS
SYSTOLIC BLOOD PRESSURE: 146 MMHG | DIASTOLIC BLOOD PRESSURE: 75 MMHG | WEIGHT: 140 LBS | TEMPERATURE: 98.3 F | BODY MASS INDEX: 23.32 KG/M2 | OXYGEN SATURATION: 98 % | HEART RATE: 69 BPM | RESPIRATION RATE: 15 BRPM | HEIGHT: 65 IN

## 2020-05-12 DIAGNOSIS — R07.89 ATYPICAL CHEST PAIN: Primary | ICD-10-CM

## 2020-05-12 LAB
ALBUMIN SERPL-MCNC: 4 G/DL (ref 3.2–4.6)
ALBUMIN/GLOB SERPL: 1.3 {RATIO} (ref 1.2–3.5)
ALP SERPL-CCNC: 62 U/L (ref 50–136)
ALT SERPL-CCNC: 9 U/L (ref 12–65)
ANION GAP SERPL CALC-SCNC: 7 MMOL/L (ref 7–16)
AST SERPL-CCNC: 13 U/L (ref 15–37)
ATRIAL RATE: 312 BPM
BASOPHILS # BLD: 0 K/UL (ref 0–0.2)
BASOPHILS NFR BLD: 1 % (ref 0–2)
BILIRUB SERPL-MCNC: 0.6 MG/DL (ref 0.2–1.1)
BUN SERPL-MCNC: 13 MG/DL (ref 8–23)
CALCIUM SERPL-MCNC: 9 MG/DL (ref 8.3–10.4)
CALCULATED R AXIS, ECG10: 61 DEGREES
CALCULATED T AXIS, ECG11: 44 DEGREES
CHLORIDE SERPL-SCNC: 104 MMOL/L (ref 98–107)
CO2 SERPL-SCNC: 29 MMOL/L (ref 21–32)
CREAT SERPL-MCNC: 0.98 MG/DL (ref 0.6–1)
DIAGNOSIS, 93000: NORMAL
DIFFERENTIAL METHOD BLD: NORMAL
EOSINOPHIL # BLD: 0.4 K/UL (ref 0–0.8)
EOSINOPHIL NFR BLD: 6 % (ref 0.5–7.8)
ERYTHROCYTE [DISTWIDTH] IN BLOOD BY AUTOMATED COUNT: 12.7 % (ref 11.9–14.6)
GLOBULIN SER CALC-MCNC: 3.2 G/DL (ref 2.3–3.5)
GLUCOSE SERPL-MCNC: 118 MG/DL (ref 65–100)
HCT VFR BLD AUTO: 44.4 % (ref 35.8–46.3)
HGB BLD-MCNC: 14.8 G/DL (ref 11.7–15.4)
IMM GRANULOCYTES # BLD AUTO: 0.1 K/UL (ref 0–0.5)
IMM GRANULOCYTES NFR BLD AUTO: 1 % (ref 0–5)
LYMPHOCYTES # BLD: 1.3 K/UL (ref 0.5–4.6)
LYMPHOCYTES NFR BLD: 17 % (ref 13–44)
MCH RBC QN AUTO: 30.1 PG (ref 26.1–32.9)
MCHC RBC AUTO-ENTMCNC: 33.3 G/DL (ref 31.4–35)
MCV RBC AUTO: 90.4 FL (ref 79.6–97.8)
MONOCYTES # BLD: 0.6 K/UL (ref 0.1–1.3)
MONOCYTES NFR BLD: 8 % (ref 4–12)
NEUTS SEG # BLD: 5 K/UL (ref 1.7–8.2)
NEUTS SEG NFR BLD: 68 % (ref 43–78)
NRBC # BLD: 0 K/UL (ref 0–0.2)
PLATELET # BLD AUTO: 231 K/UL (ref 150–450)
PMV BLD AUTO: 10 FL (ref 9.4–12.3)
POTASSIUM SERPL-SCNC: 3.6 MMOL/L (ref 3.5–5.1)
PROT SERPL-MCNC: 7.2 G/DL (ref 6.3–8.2)
Q-T INTERVAL, ECG07: 380 MS
QRS DURATION, ECG06: 72 MS
QTC CALCULATION (BEZET), ECG08: 407 MS
RBC # BLD AUTO: 4.91 M/UL (ref 4.05–5.2)
SODIUM SERPL-SCNC: 140 MMOL/L (ref 136–145)
TROPONIN I SERPL-MCNC: <0.02 NG/ML (ref 0.02–0.05)
VENTRICULAR RATE, ECG03: 69 BPM
WBC # BLD AUTO: 7.4 K/UL (ref 4.3–11.1)

## 2020-05-12 PROCEDURE — 80053 COMPREHEN METABOLIC PANEL: CPT

## 2020-05-12 PROCEDURE — 99285 EMERGENCY DEPT VISIT HI MDM: CPT

## 2020-05-12 PROCEDURE — 71045 X-RAY EXAM CHEST 1 VIEW: CPT

## 2020-05-12 PROCEDURE — 74011250637 HC RX REV CODE- 250/637

## 2020-05-12 PROCEDURE — 85025 COMPLETE CBC W/AUTO DIFF WBC: CPT

## 2020-05-12 PROCEDURE — 84484 ASSAY OF TROPONIN QUANT: CPT

## 2020-05-12 PROCEDURE — 93005 ELECTROCARDIOGRAM TRACING: CPT

## 2020-05-12 RX ORDER — HYDROGEN PEROXIDE 3 %
20 SOLUTION, NON-ORAL MISCELLANEOUS DAILY
Qty: 20 CAP | Refills: 0 | Status: SHIPPED | OUTPATIENT
Start: 2020-05-12 | End: 2020-06-01

## 2020-05-12 RX ORDER — DIAZEPAM 5 MG/1
5 TABLET ORAL
Status: COMPLETED | OUTPATIENT
Start: 2020-05-12 | End: 2020-05-12

## 2020-05-12 RX ADMIN — DIAZEPAM 5 MG: 5 TABLET ORAL at 06:47

## 2020-05-12 NOTE — ED NOTES
I have reviewed discharge instructions with the patient. The patient verbalized understanding. Patient left ED via Discharge Method: wheelchair to waiting room to await taxi. Opportunity for questions and clarification provided. Patient given 1 scripts. No e-sign        To continue your aftercare when you leave the hospital, you may receive an automated call from our care team to check in on how you are doing. This is a free service and part of our promise to provide the best care and service to meet your aftercare needs.  If you have questions, or wish to unsubscribe from this service please call [phone]. Thank you for Choosing our [facility] Emergency Department.

## 2020-05-12 NOTE — ED PROVIDER NOTES
75-year-old female with a multitude of complaints. All of them are greater than 1weeks old. She initially complained of shortness of breath and chest pain is been going on for over a week. She also has tremors from her Parkinson's disease she has chronic abdominal pain hypertension. She recently spoke with her neurologist about her shakiness and had some medication changes. Other   This is a chronic problem. The current episode started more than 1 week ago. The problem occurs constantly. The problem has not changed since onset. Associated symptoms include chest pain and shortness of breath. Nothing aggravates the symptoms. Nothing relieves the symptoms. Past Medical History:   Diagnosis Date    Abnormal gait 7/5/2016    Acute serous otitis media of left ear     Anxiety     Bilateral carpal tunnel syndrome 7/5/2016    Chronic abdominal pain 7/5/2016    Dementia due to Parkinson's disease without behavioral disturbance ([address] 7/5/2016    Depression     managed by meds    Diverticulitis     Gastritis     GERD (gastroesophageal reflux disease)     Heart murmur     follow[facility] by [facility]ology.  last echo10/2015    Hypercholesterolemia     Hypertension     does not require medication    Nausea & vomiting     Neurologic orthostatic hypotension ([address] 9/6/2016    Neuropathic pain 7/5/2016    Nontoxic multinodular goiter 7/5/2016    Orthostatic hypotension     managed by meds    Parkinson's disease ([address]     dx 2014- sometimes requires walker or wheelchair when tired    Peripheral neuropathy     Syncope 10/10/2015    Urinary frequency 7/5/2016    Vaginal bleeding     Weight loss, unintentional     50 lbs over 2 yrs       Past Surgical History:   Procedure Laterality Date    COLONOSCOPY N/A 8/6/2018    COLONOSCOPY/ 19 performed by [facility]awn [facility] at [facility] ENDOSCOPY    COLONOSCOPY N/A 4/16/2019    COLONOSCOPY/ 18 performed by Slade Hardy MD at [address] [place] [address] APPENDECTOMY  1974    HX CHOLECYSTECTOMY  2001    HX TUBAL LIGATION  1974         Family History:   Problem Relation Age of Onset    Alzheimer Mother    [address] [facility] Diabetes Father     Cancer Father         prostate    Stroke Father     Diabetes Sister     Diabetes Brother     Cancer Brother         kidney and prostate    Sickle Cell Anemia Son         2 sons     Breast Cancer Neg Hx        Social History     Socioeconomic History    Marital status:      Spouse name: Not on file    Number of children: Not on file    Years of education: Not on file    Highest education level: Not on file   Occupational History    Not on file   Social Needs    Financial resource strain: Not on file    Food insecurity     Worry: Not on file     Inability: Not on file    Transportation needs     Medical: Not on file     Non-medical: Not on file   Tobacco Use    Smoking status: Never Smoker    Smokeless tobacco: Never Used   Substance and Sexual Activity    Alcohol use: No    Drug use: No    Sexual activity: Not on file   Lifestyle    Physical activity     Days per week: Not on file     Minutes per session: Not on file    Stress: Not on file   Relationships    Social connections     Talks on phone: Not on file     Gets together: Not on file     Attends [employer] service: Not on file     Active member of club or organization: Not on file     Attends meetings of clubs or organizations: Not on file     Relationship status: Not on file    Intimate partner violence     Fear of current or ex partner: Not on file     Emotionally abused: Not on file     Physically abused: Not on file     Forced sexual activity: Not on file   Other Topics Concern    Not on file   Social History Narrative    Not on file         ALLERGIES: Flagyl [metronidazole]; Nsaids (non-steroidal anti-inflammatory drug); Peanut; Aspirin; Contrast agent [iodine]; Gabapentin; Gluten;  Iodinated contrast media; Milk containing products; Morphine; Omnipaque rediflo [iohexol]; Red dye; and Wheat    Review of Systems   Constitutional: Negative. Negative for activity change. HENT: Negative. Eyes: Negative. Respiratory: Positive for shortness of breath. Cardiovascular: Positive for chest pain. Gastrointestinal: Negative. Genitourinary: Negative. Musculoskeletal: Negative. Skin: Negative. Neurological: Negative. Psychiatric/Behavioral: Negative. All other systems reviewed and are negative. Vitals:    05/12/20 0612   BP: 163/80   Pulse: 98   Resp: 18   Temp: 98.3 °F (36.8 °C)   SpO2: 98%   Weight: 63.5 kg (140 lb)   Height: 5' 5\" (1.651 m)            Physical Exam  Vitals signs and nursing note reviewed. Constitutional:       General: She is not in acute distress. Appearance: She is well-developed. She is not diaphoretic. HENT:      Head: Normocephalic and atraumatic. Right Ear: External ear normal.      Left Ear: External ear normal.      Nose: Nose normal.      Mouth/Throat:      Pharynx: No oropharyngeal exudate. Eyes:      General: No scleral icterus. Right eye: No discharge. Left eye: No discharge. Conjunctiva/sclera: Conjunctivae normal.      Pupils: Pupils are equal, round, and reactive to light. Neck:      Musculoskeletal: Normal range of motion and neck supple. Vascular: No JVD. Trachea: No tracheal deviation. Cardiovascular:      Rate and Rhythm: Normal rate and regular rhythm. Pulmonary:      Effort: Pulmonary effort is normal. No respiratory distress. Breath sounds: Normal breath sounds. No stridor. No wheezing. Chest:      Chest wall: No tenderness. Abdominal:      General: Bowel sounds are normal. There is no distension. Palpations: Abdomen is soft. There is no mass. Tenderness: There is no abdominal tenderness. Musculoskeletal: Normal range of motion. General: No tenderness. Skin:     General: Skin is warm and dry.       Coloration: Skin is not pale. Findings: No erythema or rash. Neurological:      Mental Status: She is alert and oriented to person, place, and time. Cranial Nerves: No cranial nerve deficit. Psychiatric:         Behavior: Behavior normal.         Thought Content: Thought content normal.          MDM  Number of Diagnoses or Management Options  Diagnosis management comments: Patient has complaints of all which are greater than 24 hours most of them are greater than 3 weeks. They have been addressed by her primary care physician as well as her neurologist.  Cate Quinn work-up is reassuring and we will ask her to follow-up with her primary care.     Risk of Complications, Morbidity, and/or Mortality  Presenting problems: moderate  Diagnostic procedures: moderate  Management options: moderate           Procedures

## 2020-05-12 NOTE — ED TRIAGE NOTES
Pt comes with [facility] c/o increased pain and weakness on both legs and foot. Pt stated to have a hx of Parkinson. Also stated c/o abd pain, back pain, dyspnea, nausea. Pt stated all of this complaints have been going on for 3 months now.

## 2020-05-12 NOTE — DISCHARGE INSTRUCTIONS

## 2020-05-13 ENCOUNTER — PATIENT OUTREACH (OUTPATIENT)
Dept: CASE MANAGEMENT | Age: 70
End: 2020-05-13

## 2020-05-13 NOTE — PROGRESS NOTES
Patient contacted regarding recent discharge and COVID-19 risk   Care Transition Nurse/ Ambulatory Care Manager contacted the patient by telephone to perform post discharge assessment. Verified name and  with patient as identifiers. Patient has following risk factors of: diabetes and HTN. CTN/ACM reviewed discharge instructions, medical action plan and red flags related to discharge diagnosis. Reviewed and educated them on any new and changed medications related to discharge diagnosis. Advised obtaining a 90-day supply of all daily and as-needed medications. Education provided regarding infection prevention, and signs and symptoms of COVID-19 and when to seek medical attention with patient who verbalized understanding. Discussed exposure protocols and quarantine from [address] you at higher risk for severe illness  and given an opportunity for questions and concerns. The patient agrees to contact the COVID-19 hotline [phone] or PCP office for questions related to their healthcare. CTN/ACM provided contact information for future reference. From CDC: Are you at higher risk for severe illness?  Wash your hands often.  Avoid close contact (6 feet, which is about two arm lengths) with people who are sick.  Put distance between yourself and other people if COVID-19 is spreading in your community.  Clean and disinfect frequently touched surfaces.  Avoid all cruise travel and non-essential air travel.  Call your healthcare professional if you have concerns about COVID-19 and your underlying condition or if you are sick.     For more information on steps you can take to protect yourself, see CDC's How to Protect Yourself      Patient/family/caregiver given information for [facility] and agrees to enroll no  Patient's preferred e-mail:  No email  Patient's preferred phone number: *NA  Based on Loop alert triggers, patient will be contacted by nurse care manager for worsening symptoms. Plan for follow-up call in 7-14 days based on severity of symptoms and risk factors. Encouraged PCP f/u.

## 2020-05-27 ENCOUNTER — PATIENT OUTREACH (OUTPATIENT)
Dept: CASE MANAGEMENT | Age: 70
End: 2020-05-27

## 2020-05-28 NOTE — PROGRESS NOTES
RNCM unable to reach pt for final ED ELVIRA. Message left, no return calls. No further outreach planned.

## 2020-08-18 ENCOUNTER — ANESTHESIA EVENT (OUTPATIENT)
Dept: ENDOSCOPY | Age: 70
End: 2020-08-18
Payer: MEDICARE

## 2020-08-18 RX ORDER — SODIUM CHLORIDE, SODIUM LACTATE, POTASSIUM CHLORIDE, CALCIUM CHLORIDE 600; 310; 30; 20 MG/100ML; MG/100ML; MG/100ML; MG/100ML
100 INJECTION, SOLUTION INTRAVENOUS CONTINUOUS
Status: CANCELLED | OUTPATIENT
Start: 2020-08-18

## 2020-08-18 NOTE — PROGRESS NOTES
Confirmed arrival time with patient. No concerns noted at this time.  will be present.     Patient aware of COVID precautions

## 2020-08-19 ENCOUNTER — ANESTHESIA (OUTPATIENT)
Dept: ENDOSCOPY | Age: 70
End: 2020-08-19
Payer: MEDICARE

## 2020-08-19 ENCOUNTER — HOSPITAL ENCOUNTER (OUTPATIENT)
Age: 70
Setting detail: OUTPATIENT SURGERY
Discharge: HOME | End: 2020-08-19
Attending: INTERNAL MEDICINE | Admitting: INTERNAL MEDICINE
Payer: MEDICARE

## 2020-08-19 VITALS
DIASTOLIC BLOOD PRESSURE: 72 MMHG | WEIGHT: 128 LBS | RESPIRATION RATE: 16 BRPM | HEIGHT: 66 IN | BODY MASS INDEX: 20.57 KG/M2 | OXYGEN SATURATION: 98 % | SYSTOLIC BLOOD PRESSURE: 167 MMHG | TEMPERATURE: 97 F | HEART RATE: 66 BPM

## 2020-08-19 PROCEDURE — 88305 TISSUE EXAM BY PATHOLOGIST: CPT

## 2020-08-19 PROCEDURE — 77030021593 HC FCPS BIOP ENDOSC BSC -A: Performed by: INTERNAL MEDICINE

## 2020-08-19 PROCEDURE — 74011250636 HC RX REV CODE- 250/636: Performed by: REGISTERED NURSE

## 2020-08-19 PROCEDURE — 76040000025: Performed by: INTERNAL MEDICINE

## 2020-08-19 PROCEDURE — 74011250636 HC RX REV CODE- 250/636: Performed by: ANESTHESIOLOGY

## 2020-08-19 PROCEDURE — 74011000250 HC RX REV CODE- 250: Performed by: REGISTERED NURSE

## 2020-08-19 PROCEDURE — 76060000031 HC ANESTHESIA FIRST 0.5 HR: Performed by: INTERNAL MEDICINE

## 2020-08-19 RX ORDER — PROPOFOL 10 MG/ML
INJECTION, EMULSION INTRAVENOUS AS NEEDED
Status: DISCONTINUED | OUTPATIENT
Start: 2020-08-19 | End: 2020-08-19 | Stop reason: HOSPADM

## 2020-08-19 RX ORDER — LIDOCAINE HYDROCHLORIDE 20 MG/ML
INJECTION, SOLUTION EPIDURAL; INFILTRATION; INTRACAUDAL; PERINEURAL AS NEEDED
Status: DISCONTINUED | OUTPATIENT
Start: 2020-08-19 | End: 2020-08-19 | Stop reason: HOSPADM

## 2020-08-19 RX ORDER — SODIUM CHLORIDE, SODIUM LACTATE, POTASSIUM CHLORIDE, CALCIUM CHLORIDE 600; 310; 30; 20 MG/100ML; MG/100ML; MG/100ML; MG/100ML
100 INJECTION, SOLUTION INTRAVENOUS CONTINUOUS
Status: DISCONTINUED | OUTPATIENT
Start: 2020-08-19 | End: 2020-08-19 | Stop reason: HOSPADM

## 2020-08-19 RX ORDER — PROPOFOL 10 MG/ML
INJECTION, EMULSION INTRAVENOUS
Status: DISCONTINUED | OUTPATIENT
Start: 2020-08-19 | End: 2020-08-19 | Stop reason: HOSPADM

## 2020-08-19 RX ADMIN — LIDOCAINE HYDROCHLORIDE 60 MG: 20 INJECTION, SOLUTION EPIDURAL; INFILTRATION; INTRACAUDAL; PERINEURAL at 09:59

## 2020-08-19 RX ADMIN — SODIUM CHLORIDE, SODIUM LACTATE, POTASSIUM CHLORIDE, AND CALCIUM CHLORIDE 100 ML/HR: 600; 310; 30; 20 INJECTION, SOLUTION INTRAVENOUS at 09:52

## 2020-08-19 RX ADMIN — PROPOFOL 50 MG: 10 INJECTION, EMULSION INTRAVENOUS at 09:59

## 2020-08-19 RX ADMIN — PROPOFOL 100 MCG/KG/MIN: 10 INJECTION, EMULSION INTRAVENOUS at 09:59

## 2020-08-19 NOTE — H&P
History and Physical for Outpatient Procedure             Date: 8/19/2020     History of Present Illness:  Patient has history of dysphagia and presents for EGD with possible esophageal dilation. Past Medical History:   Diagnosis Date    Abnormal gait     freq falls    Anxiety     Chronic abdominal pain 7/5/2016    Dementia due to Parkinson's disease without behavioral disturbance ([facility] [address] 7/5/2016    Depression     managed by meds    Diverticulitis     Gastritis     GERD (gastroesophageal reflux disease)     Heart murmur     followed by [facility] cardiology.  last echo10/2015    Hypercholesterolemia     Hypertension     does not require medication    Nausea & vomiting     Neurologic orthostatic hypotension (HCC)     Neuropathic pain 7/5/2016    Nontoxic multinodular goiter 7/5/2016    Parkinson's disease ([facility] [address]     dx 2014- sometimes requires walker or wheelchair when tired    Peripheral neuropathy     Syncope     per pt-- due to drop in b/p    Urinary frequency 7/5/2016    Weight loss, unintentional     20 lbs over 2 yrs     Past Surgical History:   Procedure Laterality Date    COLONOSCOPY N/A 8/6/2018    COLONOSCOPY/ 19 performed by Padmini Alba MD at [facility] ENDOSCOPY    COLONOSCOPY N/A 4/16/2019    COLONOSCOPY/ 18 performed by Chapo Eastman MD at [address] [place] [address] HX APPENDECTOMY  1974    HX CHOLECYSTECTOMY  2001    HX TUBAL [address]     In and  Family History   Problem Relation Age of Onset    Alzheimer Mother     Diabetes Father     Cancer Father         prostate    Stroke Father     Diabetes Sister     Diabetes Brother     Cancer Brother         kidney and prostate    Sickle Cell Anemia Son         2 sons     Breast Cancer Neg Hx      Social History     Tobacco Use    Smoking status: Never Smoker    Smokeless tobacco: Never Used   Substance Use Topics    Alcohol use: No        Allergies   Allergen Reactions    Flagyl [Metronidazole] Other (comments) Upset her stomach, nausea    Nsaids (Non-Steroidal Anti-Inflammatory Drug) Diarrhea    Peanut Diarrhea    Aspirin Nausea and Vomiting    Contrast Agent [Iodine] Nausea and Vomiting    Gabapentin Nausea Only    Gluten Other (comments)     Pt is not allergic to gluten - gluten is the only thing she can eat - per pt  *Remove from allergy list per request of pre-assessment RN - 11/29/16    Iodinated Contrast Media Diarrhea    Milk Containing Products Other (comments)    Morphine Drowsiness    Omnipaque Rediflo [Iohexol] Other (comments)     Stomach upset.  Red Dye Other (comments)     Upset stomach.  Wheat Diarrhea     No current facility-administered medications for this encounter. Current Outpatient Medications   Medication Sig    OTHER Domperidone 10 mg tablets. Take 1 tablet three times a day.  amantadine HCL (SYMMETREL) 100 mg capsule Take 1 Cap by mouth two (2) times a day.  ALPRAZolam (XANAX) 0.5 mg tablet Take 1 Tab by mouth three (3) times daily as needed for Anxiety. Max Daily Amount: 1.5 mg. Indications: repeated episodes of anxiety    droxidopa (Northera) 100 mg cap capsule Take 1 Cap by mouth three (3) times daily.  pregabalin (LYRICA) 50 mg capsule Take 50 mg by mouth three (3) times daily.  DULoxetine (CYMBALTA) 30 mg capsule Take 30 mg by mouth daily.  carbidopa-levodopa (SINEMET)  mg per tablet Take 2 tabs by mouth 7 times daily    metoprolol tartrate (LOPRESSOR) 25 mg tablet Take  by mouth two (2) times a day.  levothyroxine (SYNTHROID) 75 mcg tablet Take  by mouth Daily (before breakfast).  linaCLOtide (LINZESS) 145 mcg cap capsule Take  by mouth Daily (before breakfast).  ondansetron hcl (ZOFRAN) 8 mg tablet Take 1 Tab by mouth every eight (8) hours as needed for Nausea.     prochlorperazine (COMPAZINE) 5 mg tablet TAKE 1 TABLET (5 MG) BY MOUTH 3 (THREE) TIMES A DAY AS NEEDED FOR NAUSEA OR VOMITING    acetaminophen (TYLENOL EXTRA STRENGTH) 500 mg tablet Take 1 Tab by mouth every eight (8) hours as needed for Pain.  famotidine (PEPCID) 20 mg tablet Take 20 mg by mouth two (2) times a day.  esomeprazole (NEXIUM) 40 mg capsule Take 40 mg by mouth two (2) times a day.  loratadine (CLARITIN) 10 mg tablet Take 10 mg by mouth daily as needed.  methIMAzole (TAPAZOLE) 10 mg tablet Take 5 mg by mouth daily. Review of Systems:  A detailed 10 organ review of systems is obtained with pertinent positives as listed in the History of Present Illness. All others are negative. Objective:     Physical Exam:  There were no vitals taken for this visit. General:  Alert and oriented. Heart: Regular rate and rhythm  Lungs:  Clear to auscultation bilaterally  Abdomen: Soft, nontender, nondistended    Impression/Plan:     Proceed with EGD with possible dilation as planned. I have discussed with the patient the technique, benefits, alternatives, and risks of these procedures, including medication reaction, immediate or delayed bleeding, or perforation of the gastrointestinal tract.      Signed By: Agustina Sanders MD     August 19, 2020

## 2020-08-19 NOTE — DISCHARGE INSTRUCTIONS
Gastrointestinal Esophagogastroduodenoscopy (EGD) - Upper Exam Discharge Instructions    1. Call Dr. Ken Corona for any problems or questions. 2. Contact the doctor's office for follow up appointment as directed. 3. Medication may cause drowsiness for several hours, therefore:  · Do not drive or operate machinery for remainder of the day. · No alcohol today. · Do not make any important or legal decisions for 24 hours. · Do not sign any legal documents for 24 hours. 5. Ordinarily, you may resume regular diet and activity after exam unless otherwise              specified by your physician. 6. For mild soreness in your throat you may use Cepacol throat lozenges or warm               salt-water gargles as needed. Any additional instructions:    1. Soft diet today. 2. Advance diet tomorrow as tolerated. 3. Nexium 40 mg twice daily, taken prior to breakfast and dinner. 4. Avoid NSAIDs for two days. 5. Follow-up pathology results. 6. Elavil 10 mg at night. 7. Carafate 1 g PO QAC/HS prn.   8. Return to office in 2 months. Instructions given to Elizabeth Thomason and other family members.

## 2020-08-19 NOTE — OP NOTES
Gastroenterology Procedure Note           Procedure:  Esophagogastroduodenoscopy    Date of Procedure:  8/19/2020    Patient:  Erma Brown     1950    Indication:  Dysphagia     Sedation:  MAC    Pre-Procedure Physical Exam:    Mental status:  alert and oriented  Airway:  normal oropharyngeal airway and neck mobility  CV:  regular rate and rhythm  Respiratory:  clear to auscultation    Procedure:  A History and Physical has been performed, and patient medication allergies have been reviewed. Risks of perforation, hemorrhage, adverse drug reaction, and aspiration were discussed. Informed consent was obtained for the procedure, including sedation. The patient was placed in the left lateral decubitus position. The heart rate, oxygen saturations, blood pressure, and response to care were monitored throughout the procedure. The gastroscope was passed through the mouth and advanced under direct vision to the second portion of the duodenum. A careful inspection was made as the gastroscope was withdrawn, including a retroflexed view of the proximal stomach. The patient tolerated the procedure well. Findings:     OROPHARYNX: Cords and pyriform recesses appear normal.   ESOPHAGUS: A benign-appearing intrinsic stenosis is seen in the distal esophagus. Dilation was performed using a 50-Swedish Lopez dilator. Successful dilation was confirmed by the presence of mucosal disruption. Possible short segment Lindsay's esophagus is seen. This is classified as C0/M1 by [place] criteria. Biopsies were obtained for histology. STOMACH: On retroflexion, the flap-valve is classified as Hill Grade II, with a tissue fold at the lesser curvature but with periodic opening and closing around the endoscope.  The fundus on antegrade and retroflexed views is normal. The body, antrum, and pylorus are normal.   DUODENUM: The bulb and second portions are normal.    Specimen:  Yes    Estimated Blood Loss: Minimal    Implant:  None           Impression:    Distal esophageal stricture. Dilated. Possible short segment Lindsay's. Plan:  1. Soft diet today. 2. Advance diet tomorrow as tolerated. 3. Nexium 40 mg twice daily, taken prior to breakfast and dinner. 4. Avoid NSAIDs for 48 hours. 5. Follow-up pathology results. 6. Elavil 10 mg at night. 7. Carafate 1 g PO QAC/HS prn.   8. Return to office in 2 months.      Signed:  Elidia Hernández MD  8/19/2020  7:41 AM

## 2020-08-19 NOTE — ANESTHESIA POSTPROCEDURE EVALUATION
Procedure(s):  ESOPHAGOGASTRODUODENOSCOPY (EGD) POSS DILATION/BMI 21  ESOPHAGOGASTRODUODENAL (EGD) BIOPSY  ESOPHAGEAL DILATION. total IV anesthesia    Anesthesia Post Evaluation      Multimodal analgesia: multimodal analgesia used between 6 hours prior to anesthesia start to PACU discharge  Patient location during evaluation: PACU  Patient participation: complete - patient participated  Level of consciousness: awake  Pain management: adequate  Airway patency: patent  Anesthetic complications: no  Cardiovascular status: acceptable  Respiratory status: spontaneous ventilation and acceptable  Hydration status: acceptable  Post anesthesia nausea and vomiting:  none      INITIAL Post-op Vital signs:   Vitals Value Taken Time   /72 8/19/2020 10:40 AM   Temp     Pulse 67 8/19/2020 10:41 AM   Resp 16 8/19/2020 10:40 AM   SpO2 97 % 8/19/2020 10:41 AM   Vitals shown include unvalidated device data.

## 2020-08-19 NOTE — ANESTHESIA PREPROCEDURE EVALUATION
Anesthetic History     PONV          Review of Systems / Medical History  Patient summary reviewed and pertinent labs reviewed    Pulmonary  Within defined limits                 Neuro/Psych         Neuromuscular disease (Parkinson's), psychiatric history (Depression and Anxiety) and dementia     Cardiovascular    Hypertension: well controlled          Hyperlipidemia    Exercise tolerance: >4 METS  Comments: Orthostatic hypotension  Pre syncopal episodes  Nml EF   GI/Hepatic/Renal     GERD: well controlled           Endo/Other    Diabetes: well controlled, type 2  Hypothyroidism: well controlled       Other Findings   Comments: Peripheral neuropathy           Physical Exam    Airway  Mallampati: I  TM Distance: 4 - 6 cm  Neck ROM: normal range of motion   Mouth opening: Normal     Cardiovascular    Rhythm: regular  Rate: normal    Murmur: Grade 2, Aortic area     Dental    Dentition: Caps/crowns     Pulmonary  Breath sounds clear to auscultation               Abdominal  GI exam deferred       Other Findings            Anesthetic Plan    ASA: 3  Anesthesia type: total IV anesthesia          Induction: Intravenous  Anesthetic plan and risks discussed with: Patient

## 2020-11-13 ENCOUNTER — HOSPITAL ENCOUNTER (EMERGENCY)
Age: 70
Discharge: HOME | End: 2020-11-13
Attending: EMERGENCY MEDICINE
Payer: MEDICARE

## 2020-11-13 VITALS
TEMPERATURE: 97.9 F | OXYGEN SATURATION: 95 % | SYSTOLIC BLOOD PRESSURE: 175 MMHG | RESPIRATION RATE: 16 BRPM | HEART RATE: 72 BPM | DIASTOLIC BLOOD PRESSURE: 80 MMHG

## 2020-11-13 DIAGNOSIS — R11.2 NON-INTRACTABLE VOMITING WITH NAUSEA, UNSPECIFIED VOMITING TYPE: Primary | ICD-10-CM

## 2020-11-13 DIAGNOSIS — I95.1 ORTHOSTATIC HYPOTENSION: ICD-10-CM

## 2020-11-13 DIAGNOSIS — E86.1 HYPOVOLEMIA: ICD-10-CM

## 2020-11-13 LAB
ALBUMIN SERPL-MCNC: 4.3 G/DL (ref 3.2–4.6)
ALBUMIN/GLOB SERPL: 1.4 {RATIO} (ref 1.2–3.5)
ALP SERPL-CCNC: 70 U/L (ref 50–136)
ALT SERPL-CCNC: 6 U/L (ref 12–65)
ANION GAP SERPL CALC-SCNC: 8 MMOL/L (ref 7–16)
APPEARANCE UR: CLEAR
AST SERPL-CCNC: 9 U/L (ref 15–37)
BACTERIA URNS QL MICRO: 0 /HPF
BASOPHILS # BLD: 0.1 K/UL (ref 0–0.2)
BASOPHILS NFR BLD: 1 % (ref 0–2)
BILIRUB SERPL-MCNC: 0.6 MG/DL (ref 0.2–1.1)
BILIRUB UR QL: NEGATIVE
BUN SERPL-MCNC: 12 MG/DL (ref 8–23)
CALCIUM SERPL-MCNC: 8.9 MG/DL (ref 8.3–10.4)
CASTS URNS QL MICRO: ABNORMAL /LPF
CHLORIDE SERPL-SCNC: 104 MMOL/L (ref 98–107)
CO2 SERPL-SCNC: 29 MMOL/L (ref 21–32)
COLOR UR: YELLOW
CREAT SERPL-MCNC: 1.02 MG/DL (ref 0.6–1)
DIFFERENTIAL METHOD BLD: ABNORMAL
EOSINOPHIL # BLD: 0.2 K/UL (ref 0–0.8)
EOSINOPHIL NFR BLD: 3 % (ref 0.5–7.8)
EPI CELLS #/AREA URNS HPF: 0 /HPF
ERYTHROCYTE [DISTWIDTH] IN BLOOD BY AUTOMATED COUNT: 13.3 % (ref 11.9–14.6)
GLOBULIN SER CALC-MCNC: 3.1 G/DL (ref 2.3–3.5)
GLUCOSE SERPL-MCNC: 109 MG/DL (ref 65–100)
GLUCOSE UR STRIP.AUTO-MCNC: NEGATIVE MG/DL
HCT VFR BLD AUTO: 40.2 % (ref 35.8–46.3)
HGB BLD-MCNC: 13.2 G/DL (ref 11.7–15.4)
HGB UR QL STRIP: NEGATIVE
IMM GRANULOCYTES # BLD AUTO: 0.1 K/UL (ref 0–0.5)
IMM GRANULOCYTES NFR BLD AUTO: 1 % (ref 0–5)
KETONES UR QL STRIP.AUTO: 15 MG/DL
LEUKOCYTE ESTERASE UR QL STRIP.AUTO: NEGATIVE
LIPASE SERPL-CCNC: 96 U/L (ref 73–393)
LYMPHOCYTES # BLD: 0.5 K/UL (ref 0.5–4.6)
LYMPHOCYTES NFR BLD: 6 % (ref 13–44)
MCH RBC QN AUTO: 28.8 PG (ref 26.1–32.9)
MCHC RBC AUTO-ENTMCNC: 32.8 G/DL (ref 31.4–35)
MCV RBC AUTO: 87.8 FL (ref 79.6–97.8)
MONOCYTES # BLD: 0.3 K/UL (ref 0.1–1.3)
MONOCYTES NFR BLD: 4 % (ref 4–12)
NEUTS SEG # BLD: 6.9 K/UL (ref 1.7–8.2)
NEUTS SEG NFR BLD: 86 % (ref 43–78)
NITRITE UR QL STRIP.AUTO: NEGATIVE
NRBC # BLD: 0 K/UL (ref 0–0.2)
PH UR STRIP: 6 [PH] (ref 5–9)
PLATELET # BLD AUTO: 202 K/UL (ref 150–450)
PMV BLD AUTO: 10.9 FL (ref 9.4–12.3)
POTASSIUM SERPL-SCNC: 3.8 MMOL/L (ref 3.5–5.1)
PROT SERPL-MCNC: 7.4 G/DL (ref 6.3–8.2)
PROT UR STRIP-MCNC: 30 MG/DL
RBC # BLD AUTO: 4.58 M/UL (ref 4.05–5.2)
RBC #/AREA URNS HPF: ABNORMAL /HPF
SODIUM SERPL-SCNC: 141 MMOL/L (ref 136–145)
SP GR UR REFRACTOMETRY: 1.02 (ref 1–1.02)
UROBILINOGEN UR QL STRIP.AUTO: 0.2 EU/DL (ref 0.2–1)
WBC # BLD AUTO: 8 K/UL (ref 4.3–11.1)
WBC URNS QL MICRO: ABNORMAL /HPF

## 2020-11-13 PROCEDURE — 83690 ASSAY OF LIPASE: CPT

## 2020-11-13 PROCEDURE — 80053 COMPREHEN METABOLIC PANEL: CPT

## 2020-11-13 PROCEDURE — 96361 HYDRATE IV INFUSION ADD-ON: CPT

## 2020-11-13 PROCEDURE — 74011250636 HC RX REV CODE- 250/636: Performed by: EMERGENCY MEDICINE

## 2020-11-13 PROCEDURE — 74011250637 HC RX REV CODE- 250/637: Performed by: EMERGENCY MEDICINE

## 2020-11-13 PROCEDURE — 85025 COMPLETE CBC W/AUTO DIFF WBC: CPT

## 2020-11-13 PROCEDURE — 99284 EMERGENCY DEPT VISIT MOD MDM: CPT

## 2020-11-13 PROCEDURE — 81001 URINALYSIS AUTO W/SCOPE: CPT

## 2020-11-13 PROCEDURE — 96374 THER/PROPH/DIAG INJ IV PUSH: CPT

## 2020-11-13 RX ORDER — CARBIDOPA AND LEVODOPA 25; 100 MG/1; MG/1
2 TABLET ORAL
Status: DISCONTINUED | OUTPATIENT
Start: 2020-11-13 | End: 2020-11-14 | Stop reason: HOSPADM

## 2020-11-13 RX ORDER — ALPRAZOLAM 0.5 MG/1
0.5 TABLET ORAL
Status: COMPLETED | OUTPATIENT
Start: 2020-11-13 | End: 2020-11-13

## 2020-11-13 RX ORDER — DULOXETIN HYDROCHLORIDE 30 MG/1
30 CAPSULE, DELAYED RELEASE ORAL DAILY
Status: DISCONTINUED | OUTPATIENT
Start: 2020-11-14 | End: 2020-11-14 | Stop reason: HOSPADM

## 2020-11-13 RX ORDER — ONDANSETRON 2 MG/ML
4 INJECTION INTRAMUSCULAR; INTRAVENOUS
Status: COMPLETED | OUTPATIENT
Start: 2020-11-13 | End: 2020-11-13

## 2020-11-13 RX ORDER — ONDANSETRON 8 MG/1
8 TABLET, ORALLY DISINTEGRATING ORAL
Qty: 12 TAB | Refills: 1 | Status: SHIPPED | OUTPATIENT
Start: 2020-11-13

## 2020-11-13 RX ORDER — SODIUM CHLORIDE 9 MG/ML
150 INJECTION, SOLUTION INTRAVENOUS CONTINUOUS
Status: DISCONTINUED | OUTPATIENT
Start: 2020-11-13 | End: 2020-11-14 | Stop reason: HOSPADM

## 2020-11-13 RX ADMIN — SODIUM CHLORIDE 150 ML/HR: 900 INJECTION, SOLUTION INTRAVENOUS at 17:10

## 2020-11-13 RX ADMIN — ALPRAZOLAM 0.5 MG: 0.5 TABLET ORAL at 18:44

## 2020-11-13 RX ADMIN — ONDANSETRON 4 MG: 2 INJECTION INTRAMUSCULAR; INTRAVENOUS at 17:10

## 2020-11-13 NOTE — ED PROVIDER NOTES
2:11 PM  Provider in triage    25-year-old female with a history of Parkinson disease presents with complaints of nausea vomiting  States she has been having difficulty swallowing some of her Parkinson's pills. Has a history of gastritis and GERD  Patient denies fever cough or shortness of breath    Exam  Patient is awake and alert appears somewhat frail  Lungs are clear    We will check labs start IV fluids and antiemetics  Further care to be completed in main ER by another provider. Signed By: Hi Castro MD    November 13, 2020   ==================================  Chief complaint : Malaise, weakness, nausea, vomiting    HISTORY OF PRESENT ILLNESS :  Location : Generalized    Quality : Nonbloody emesis    Quantity : 4-5 spells of vomiting today    Timing : Feeling poorly for a week, vomiting started this morning    Severity : Moderate    Alleviating / exacerbating factors : Increased tremors and leg pain due to inability to keep her Parkinson's or neuropathy medicines down    Associated Symptoms : No chest pain, no dyspnea, no URI        Leg Pain    Pertinent negatives include no back pain. Vomiting    Pertinent negatives include no chills, no fever, no abdominal pain, no diarrhea, no headaches, no arthralgias, no cough and no headaches. Past Medical History:   Diagnosis Date    Abnormal gait     freq falls    Anxiety     Chronic abdominal pain 7/5/2016    Dementia due to Parkinson's disease without behavioral disturbance ([facility] [address] 7/5/2016    Depression     managed by meds    Diverticulitis     Gastritis     GERD (gastroesophageal reflux disease)     Heart murmur     followed by [facility] cardiology.  last echo10/2015    Hypercholesterolemia     Hypertension     does not require medication    Nausea & vomiting     Neurologic orthostatic hypotension (HCC)     Neuropathic pain 7/5/2016    Nontoxic multinodular goiter 7/5/2016    Parkinson's disease ([facility] [address]     dx 2014- sometimes requires walker or wheelchair when tired    Peripheral neuropathy     Syncope     per pt-- due to drop in b/p    Urinary frequency 7/5/2016    Weight loss, unintentional     20 lbs over 2 yrs       Past Surgical History:   Procedure Laterality Date    COLONOSCOPY N/A 8/6/2018    COLONOSCOPY/ 19 performed by Jai Jay MD at [address] [place] [address] N/A 4/16/2019    COLONOSCOPY/ 18 performed by Mitchell Noriega MD at [facility] ENDOSCOPY    HX APPENDECTOMY  1974    HX CHOLECYSTECTOMY  2001    HX TUBAL LIGATION  1974         Family History:   Problem Relation Age of Onset    Alzheimer Mother     Diabetes Father     Cancer Father         prostate    Stroke Father     Diabetes Sister     Diabetes Brother     Cancer Brother         kidney and prostate    Sickle Cell Anemia Son         2 sons     Breast Cancer Neg Hx        Social History     Socioeconomic History    Marital status:      Spouse name: Not on file    Number of children: Not on file    Years of education: Not on file    Highest education level: Not on file   Occupational History    Not on file   Social Needs    Financial resource strain: Not on file    Food insecurity     Worry: Not on file     Inability: Not on file    Transportation needs     Medical: Not on file     Non-medical: Not on file   Tobacco Use    Smoking status: Never Smoker    Smokeless tobacco: Never Used   Substance and Sexual Activity    Alcohol use: No    Drug use: No    Sexual activity: Not on file   Lifestyle    Physical activity     Days per week: Not on file     Minutes per session: Not on file    Stress: Not on file   Relationships    Social connections     Talks on phone: Not on file     Gets together: Not on file     Attends Buddhist service: Not on file     Active member of club or organization: Not on file     Attends meetings of clubs or organizations: Not on file     Relationship status: Not on file    Intimate partner violence Fear of current or ex partner: Not on file     Emotionally abused: Not on file     Physically abused: Not on file     Forced sexual activity: Not on file   Other Topics Concern    Not on file   Social History Narrative    Not on file         ALLERGIES: Flagyl [metronidazole]; Nsaids (non-steroidal anti-inflammatory drug); Peanut; Aspirin; Contrast agent [iodine]; Gabapentin; Gluten; Iodinated contrast media; Milk containing products; Morphine; Omnipaque rediflo [iohexol]; Red dye; and Wheat    Review of Systems   Constitutional: Positive for activity change and fatigue. Negative for chills and fever. HENT: Negative for rhinorrhea and sore throat. Eyes: Negative for discharge and redness. Respiratory: Negative for cough and shortness of breath. Cardiovascular: Negative for chest pain and palpitations. Gastrointestinal: Positive for nausea and vomiting. Negative for abdominal pain and diarrhea. Genitourinary: Positive for decreased urine volume and urgency. Musculoskeletal: Negative for arthralgias and back pain. Skin: Negative for rash. Neurological: Positive for tremors. Negative for dizziness and headaches. All other systems reviewed and are negative. Vitals:    11/13/20 1408   BP: (!) 155/76   Pulse: 85   Resp: 16   Temp: 97.9 °F (36.6 °C)   SpO2: 97%            Physical Exam  Vitals signs and nursing note reviewed. Constitutional:       General: She is not in acute distress. Appearance: Normal appearance. She is well-developed. She is not ill-appearing, toxic-appearing or diaphoretic. Comments: Dizziness on orthostatics, heart rate accelerates by 19, drops by drops by 10   HENT:      Head: Normocephalic and atraumatic. Eyes:      General: No scleral icterus. Right eye: No discharge. Left eye: No discharge. Conjunctiva/sclera: Conjunctivae normal.      Pupils: Pupils are equal, round, and reactive to light.    Neck:      Musculoskeletal: Normal range of motion and neck supple. Cardiovascular:      Rate and Rhythm: Normal rate and regular rhythm. Heart sounds: Normal heart sounds. No murmur. No gallop. Pulmonary:      Effort: Pulmonary effort is normal. No respiratory distress. Breath sounds: Normal breath sounds. No wheezing or rales. Abdominal:      General: Bowel sounds are normal.      Palpations: Abdomen is soft. Tenderness: There is no abdominal tenderness. There is no guarding. Musculoskeletal: Normal range of motion. Skin:     General: Skin is warm and dry. Neurological:      General: No focal deficit present. Mental Status: She is alert and oriented to person, place, and time. Mental status is at baseline. Motor: No abnormal muscle tone. Comments: cni 2-12 grossly   Psychiatric:         Mood and Affect: Mood normal.         Behavior: Behavior normal.          MDM  Number of Diagnoses or Management Options  Diagnosis management comments: Medical decision making note:  Generalized weakness with nausea and vomiting compounding Parkinson's disease. We will give some fluids, antiemetics, and around of her Parkinson's medicines  Slight orthostasis noted reevaluate after fluids and medicines and labs  This concludes the \"medical decision making note\" part of this emergency department visit note.            Procedures

## 2020-11-13 NOTE — ED TRIAGE NOTES
Pt arrives from home via EMS. Pt states her MD prescribed meds for parkinson, but she can't swallow the pill and has arm and leg pain. Pt states her stomach is burning and has pepcid but didn't take it. Pt took celebrex on empty stomack. Pt states issues exist x 2 weeks. , 97.3 /110 in route. Pt states the pills are too big for her to swallow.

## 2020-11-14 NOTE — DISCHARGE INSTRUCTIONS
Patient Education        Nausea and Vomiting: Care Instructions  Your Care Instructions     When you are nauseated, you may feel weak and sweaty and notice a lot of saliva in your mouth. Nausea often leads to vomiting. Most of the time you do not need to worry about nausea and vomiting, but they can be signs of other illnesses. Two common causes of nausea and vomiting are stomach flu and food poisoning. Nausea and vomiting from viral stomach flu will usually start to improve within 24 hours. Nausea and vomiting from food poisoning may last from 12 to 48 hours. The doctor has checked you carefully, but problems can develop later. If you notice any problems or new symptoms, get medical treatment right away. Follow-up care is a key part of your treatment and safety. Be sure to make and go to all appointments, and call your doctor if you are having problems. It's also a good idea to know your test results and keep a list of the medicines you take. How can you care for yourself at home? · To prevent dehydration, drink plenty of fluids, enough so that your urine is light yellow or clear like water. Choose water and other caffeine-free clear liquids until you feel better. If you have kidney, heart, or liver disease and have to limit fluids, talk with your doctor before you increase the amount of fluids you drink. · Rest in bed until you feel better. · When you are able to eat, try clear soups, mild foods, and liquids until all symptoms are gone for 12 to 48 hours. Other good choices include dry toast, crackers, cooked cereal, and gelatin dessert, such as Jell-O. When should you call for help? Call 911 anytime you think you may need emergency care. For example, call if:    · You passed out (lost consciousness). Call your doctor now or seek immediate medical care if:    · You have symptoms of dehydration, such as:  ? Dry eyes and a dry mouth. ? Passing only a little dark urine. ?  Feeling thirstier than usual.   · You have new or worsening belly pain.     · You have a new or higher fever.     · You vomit blood or what looks like coffee grounds. Watch closely for changes in your health, and be sure to contact your doctor if:    · You have ongoing nausea and vomiting.     · Your vomiting is getting worse.     · Your vomiting lasts longer than 2 days.     · You are not getting better as expected. Where can you learn more? Go to http://www.burdick.com/  Enter H591 in the search box to learn more about \"Nausea and Vomiting: Care Instructions. \"  Current as of: June 26, 2019               Content Version: 12.6  © 3851-6702 [employer]. Care instructions adapted under license by [employer] (which disclaims liability or warranty for this information). If you have questions about a medical condition or this instruction, always ask your healthcare professional. [address] any warranty or liability for your use of this information.

## 2020-11-14 NOTE — ED NOTES
I have reviewed discharge instructions with the patient. The patient verbalized understanding. Patient left ED via Discharge Method: wheelchair to Home with family. Opportunity for questions and clarification provided. Patient given 1 scripts. To continue your aftercare when you leave the hospital, you may receive an automated call from our care team to check in on how you are doing. This is a free service and part of our promise to provide the best care and service to meet your aftercare needs.  If you have questions, or wish to unsubscribe from this service please call [phone]. Thank you for Choosing our [address] [place] [address] Emergency Department.

## 2020-11-14 NOTE — ED NOTES
Pt daughter called several times. Phone went straight to voicemail, \"mailbox full\". Pt does not have keys to her house. Pt does not have phone and cannot recall any other phone number. Dinner tray given to pt while she is waiting.

## 2021-01-04 NOTE — ED TRIAGE NOTES
EMS brought patient in from home for multiple complains. Reports leg pain, abdominal pain, neck and back pain for three years.  History of Parkinson's, HTN and neuropathy I approve and recommend she get it
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
## 2021-08-18 ENCOUNTER — ANESTHESIA EVENT (OUTPATIENT)
Dept: ENDOSCOPY | Age: 71
End: 2021-08-18
Payer: MEDICARE

## 2021-08-18 RX ORDER — SODIUM CHLORIDE, SODIUM LACTATE, POTASSIUM CHLORIDE, CALCIUM CHLORIDE 600; 310; 30; 20 MG/100ML; MG/100ML; MG/100ML; MG/100ML
100 INJECTION, SOLUTION INTRAVENOUS CONTINUOUS
Status: CANCELLED | OUTPATIENT
Start: 2021-08-18

## 2021-08-19 ENCOUNTER — ANESTHESIA (OUTPATIENT)
Dept: ENDOSCOPY | Age: 71
End: 2021-08-19
Payer: MEDICARE

## 2021-08-19 ENCOUNTER — HOSPITAL ENCOUNTER (OUTPATIENT)
Age: 71
Setting detail: OUTPATIENT SURGERY
Discharge: HOME | End: 2021-08-19
Attending: INTERNAL MEDICINE | Admitting: INTERNAL MEDICINE
Payer: MEDICARE

## 2021-08-19 VITALS
DIASTOLIC BLOOD PRESSURE: 65 MMHG | RESPIRATION RATE: 17 BRPM | BODY MASS INDEX: 19.29 KG/M2 | OXYGEN SATURATION: 99 % | HEART RATE: 103 BPM | TEMPERATURE: 98.8 F | SYSTOLIC BLOOD PRESSURE: 147 MMHG | HEIGHT: 66 IN | WEIGHT: 120 LBS

## 2021-08-19 PROCEDURE — 74011250636 HC RX REV CODE- 250/636: Performed by: ANESTHESIOLOGY

## 2021-08-19 PROCEDURE — 77030021593 HC FCPS BIOP ENDOSC BSC -A: Performed by: INTERNAL MEDICINE

## 2021-08-19 PROCEDURE — 76060000032 HC ANESTHESIA 0.5 TO 1 HR: Performed by: INTERNAL MEDICINE

## 2021-08-19 PROCEDURE — 77030008969: Performed by: INTERNAL MEDICINE

## 2021-08-19 PROCEDURE — 2709999900 HC NON-CHARGEABLE SUPPLY: Performed by: INTERNAL MEDICINE

## 2021-08-19 PROCEDURE — 76040000025: Performed by: INTERNAL MEDICINE

## 2021-08-19 PROCEDURE — 74011000250 HC RX REV CODE- 250: Performed by: REGISTERED NURSE

## 2021-08-19 PROCEDURE — 88305 TISSUE EXAM BY PATHOLOGIST: CPT

## 2021-08-19 PROCEDURE — 74011250636 HC RX REV CODE- 250/636: Performed by: REGISTERED NURSE

## 2021-08-19 RX ORDER — PROPOFOL 10 MG/ML
INJECTION, EMULSION INTRAVENOUS
Status: DISCONTINUED | OUTPATIENT
Start: 2021-08-19 | End: 2021-08-19 | Stop reason: HOSPADM

## 2021-08-19 RX ORDER — HYDRALAZINE HYDROCHLORIDE 20 MG/ML
10 INJECTION INTRAMUSCULAR; INTRAVENOUS
Status: DISCONTINUED | OUTPATIENT
Start: 2021-08-19 | End: 2021-08-19 | Stop reason: HOSPADM

## 2021-08-19 RX ORDER — SODIUM CHLORIDE, SODIUM LACTATE, POTASSIUM CHLORIDE, CALCIUM CHLORIDE 600; 310; 30; 20 MG/100ML; MG/100ML; MG/100ML; MG/100ML
INJECTION, SOLUTION INTRAVENOUS
Status: DISCONTINUED | OUTPATIENT
Start: 2021-08-19 | End: 2021-08-19 | Stop reason: HOSPADM

## 2021-08-19 RX ORDER — LIDOCAINE HYDROCHLORIDE 20 MG/ML
INJECTION, SOLUTION EPIDURAL; INFILTRATION; INTRACAUDAL; PERINEURAL AS NEEDED
Status: DISCONTINUED | OUTPATIENT
Start: 2021-08-19 | End: 2021-08-19 | Stop reason: HOSPADM

## 2021-08-19 RX ORDER — PROPOFOL 10 MG/ML
INJECTION, EMULSION INTRAVENOUS AS NEEDED
Status: DISCONTINUED | OUTPATIENT
Start: 2021-08-19 | End: 2021-08-19 | Stop reason: HOSPADM

## 2021-08-19 RX ORDER — HYDRALAZINE HYDROCHLORIDE 20 MG/ML
10 INJECTION INTRAMUSCULAR; INTRAVENOUS ONCE
Status: COMPLETED | OUTPATIENT
Start: 2021-08-19 | End: 2021-08-19

## 2021-08-19 RX ADMIN — LIDOCAINE HYDROCHLORIDE 60 MG: 20 INJECTION, SOLUTION EPIDURAL; INFILTRATION; INTRACAUDAL; PERINEURAL at 11:27

## 2021-08-19 RX ADMIN — HYDRALAZINE HYDROCHLORIDE 10 MG: 20 INJECTION, SOLUTION INTRAMUSCULAR; INTRAVENOUS at 12:29

## 2021-08-19 RX ADMIN — SODIUM CHLORIDE, SODIUM LACTATE, POTASSIUM CHLORIDE, AND CALCIUM CHLORIDE: 600; 310; 30; 20 INJECTION, SOLUTION INTRAVENOUS at 11:18

## 2021-08-19 RX ADMIN — PROPOFOL 50 MG: 10 INJECTION, EMULSION INTRAVENOUS at 11:27

## 2021-08-19 RX ADMIN — PROPOFOL 100 MCG/KG/MIN: 10 INJECTION, EMULSION INTRAVENOUS at 11:27

## 2021-08-19 NOTE — DISCHARGE INSTRUCTIONS
Gastrointestinal Esophagogastroduodenoscopy (EGD) - Upper Exam Discharge Instructions    1. Call Dr. Rick Go office for any problems or questions. 2. Contact the doctor's office for follow up appointment as directed. 3. Medication may cause drowsiness for several hours, therefore:  · Do not drive or operate machinery for remainder of the day. · No alcohol today. · Do not make any important or legal decisions for 24 hours. · Do not sign any legal documents for 24 hours. 5. Ordinarily, you may resume regular diet and activity after exam unless otherwise  specified by your physician. 6. For mild soreness in your throat you may use Cepacol throat lozenges or warm salt-water gargles as needed. Any additional instructions: 1. Office will contact you with pathology results and follow-up appointments. 2. Avoid NSAIDs such as Ibuprofen and Naproxen. 3. Nexium 40 mg twice daily, taken prior to breakfast and dinner. 4. Carafate 1 g by mouth at night. Dr. Nenita Gold advised a minimum of 2 doses daily for at least the next 4 weeks on a trial basis. 5. GI cocktail every 6 hours as needed. 6. MRCP in 12 months for surveillance of pancreatic cyst.  7. Return to office in 2 months.

## 2021-08-19 NOTE — H&P
History and Physical for Procedure             Date: 8/19/2021     History of Present Illness:  Patient presents to undergo EGD and EUS. Past Medical History:   Diagnosis Date    Abnormal gait     freq falls    Anxiety     Chronic abdominal pain 7/5/2016    Dementia due to Parkinson's disease without behavioral disturbance ([facility] [address] 7/5/2016    Depression     managed by meds    Diverticulitis     Gastritis     GERD (gastroesophageal reflux disease)     Heart murmur     followed by [facility] cardiology.  last echo10/2015    Hypercholesterolemia     Hypertension     does not require medication    Nausea & vomiting     Neurologic orthostatic hypotension (HCC)     Neuropathic pain 7/5/2016    Nontoxic multinodular goiter 7/5/2016    Parkinson's disease ([facility] [address]     dx 2014- sometimes requires walker or wheelchair when tired    Peripheral neuropathy     Syncope     per pt-- due to drop in b/p    Urinary frequency 7/5/2016    Weight loss, unintentional     20 lbs over 2 yrs     Past Surgical History:   Procedure Laterality Date    COLONOSCOPY N/A 8/6/2018    COLONOSCOPY/ 19 performed by Adelia Ring MD at [facility] ENDOSCOPY    COLONOSCOPY N/A 4/16/2019    COLONOSCOPY/ 18 performed by Leeanne Verduzco MD at [address] [place] [address] HX APPENDECTOMY  1974    HX CHOLECYSTECTOMY  2001    HX TUBAL [address]     In and  Family History   Problem Relation Age of Onset    Alzheimer Mother     Diabetes Father     Cancer Father         prostate    Stroke Father     Diabetes Sister     Diabetes Brother     Cancer Brother         kidney and prostate    Sickle Cell Anemia Son         2 sons     Breast Cancer Neg Hx      Social History     Tobacco Use    Smoking status: Never Smoker    Smokeless tobacco: Never Used   Substance Use Topics    Alcohol use: No        Allergies   Allergen Reactions    Flagyl [Metronidazole] Other (comments)     Upset her stomach, nausea    Nsaids (Non-Steroidal Anti-Inflammatory Drug) Diarrhea    Peanut Diarrhea    Aspirin Nausea and Vomiting    Contrast Agent [Iodine] Nausea and Vomiting    Gabapentin Nausea Only    Gluten Other (comments)     Pt is not allergic to gluten - gluten is the only thing she can eat - per pt  *Remove from allergy list per request of pre-assessment RN - 11/29/16    Iodinated Contrast Media Diarrhea    Milk Containing Products Other (comments)    Morphine Drowsiness    Omnipaque Rediflo [Iohexol] Other (comments)     Stomach upset.  Red Dye Other (comments)     Upset stomach.  Wheat Diarrhea     No current facility-administered medications for this encounter. Current Outpatient Medications   Medication Sig    droxidopa (Northera) 100 mg cap capsule Take 1 Cap by mouth three (3) times daily.  ALPRAZolam (XANAX) 0.5 mg tablet Take 1 Tab by mouth three (3) times daily as needed for Anxiety. Max Daily Amount: 1.5 mg. Indications: repeated episodes of anxiety    DULoxetine (Cymbalta) 30 mg capsule Take 1 Cap by mouth daily.  carbidopa-levodopa ER (Sinemet CR)  mg per tablet Take 1 Tab by mouth three (3) times daily.  pregabalin (LYRICA) 50 mg capsule Take 1 Cap by mouth two (2) times a day. Max Daily Amount: 100 mg.  carbidopa-levodopa (Sinemet)  mg per tablet Take 2 tabs by mouth 7 times daily    fexofenadine (ALLEGRA) 60 mg tablet Take 60 mg by mouth two (2) times a day.  docusate sodium (Colace) 100 mg capsule Take 100 mg by mouth daily.  celecoxib (CeleBREX) 100 mg capsule Take  by mouth daily as needed for Pain.  metoprolol succinate (TOPROL-XL) 25 mg XL tablet Take 12.5 mg by mouth as needed.  ondansetron (ZOFRAN ODT) 8 mg disintegrating tablet Take 1 Tab by mouth every eight (8) hours as needed for Nausea.  OTHER Domperidone 10 mg tablets. Take 1 tablet three times a day.  levothyroxine (SYNTHROID) 75 mcg tablet Take  by mouth Daily (before breakfast).     linaCLOtide (LINZESS) 145 mcg cap capsule Take  by mouth Daily (before breakfast).  ondansetron hcl (ZOFRAN) 8 mg tablet Take 1 Tab by mouth every eight (8) hours as needed for Nausea.  prochlorperazine (COMPAZINE) 5 mg tablet TAKE 1 TABLET (5 MG) BY MOUTH 3 (THREE) TIMES A DAY AS NEEDED FOR NAUSEA OR VOMITING    acetaminophen (TYLENOL EXTRA STRENGTH) 500 mg tablet Take 1 Tab by mouth every eight (8) hours as needed for Pain.  famotidine (PEPCID) 20 mg tablet Take 40 mg by mouth two (2) times a day.  esomeprazole (NEXIUM) 40 mg capsule Take 40 mg by mouth two (2) times a day.  loratadine (CLARITIN) 10 mg tablet Take 10 mg by mouth daily as needed. Review of Systems:  A detailed 10 organ review of systems is obtained with pertinent positives as listed in the History of Present Illness. All others are negative. Objective:     Physical Exam:  There were no vitals taken for this visit. General:  Alert and oriented. Heart: Regular rate and rhythm  Lungs:  Clear to auscultation bilaterally  Abdomen: Soft, nontender, nondistended    Impression/Plan:     Proceed with EGD and EUS as planned. I have discussed with the patient the technique, benefits, alternatives, and risks of these procedures, including medication reaction, immediate or delayed bleeding, or perforation of the gastrointestinal tract.      Signed By: Karen Leija MD     August 19, 2021

## 2021-08-19 NOTE — ROUTINE PROCESS
VSS. Patient denies any complaints at this time. Discharge education provided to patient and daughter. Patient discharged out via wheelchair with RN.

## 2021-08-19 NOTE — OP NOTES
Procedure:  Esophagogastroduodenoscopy, Endoscopic ultrasound with Doppler     Date of Procedure:  8/19/2021    Patient:  Faustino Rahman     1950    Indication:  Epigastric pain, GERD, dysphagia, dilated common bile duct     Sedation:  MAC    Pre-Procedure Physical Exam:    Mental status:  alert and oriented  Airway:  normal oropharyngeal airway and neck mobility  CV:  regular rate and rhythm  Respiratory:  clear to auscultation    Procedure:  A History and Physical has been performed, and patient medication allergies have been reviewed. Risks of perforation, hemorrhage, adverse drug reaction, and aspiration were discussed. Informed consent was obtained for the procedure, including sedation. The patient was placed in the left lateral decubitus position. The heart rate, oxygen saturations, blood pressure, and response to care were monitored throughout the procedure. The linear echoendoscope was passed through the mouth and advanced under direct vision to the distal duodenum. As the scope was slowly withdrawn, detailed endoscopic images were obtained from the surrounding organs. The patient tolerated the procedure well. The gastroscope was passed through the mouth and advanced under direct vision to the second portion of the duodenum. A careful inspection was made as the gastroscope was withdrawn, including a retroflexed view of the proximal stomach. The patient tolerated the procedure well. Findings:     ENDOSCOPIC FINDINGS:      OROPHARYNX: Cords and pyriform recesses appear normal.   ESOPHAGUS: A benign-appearing intrinsic stenosis is seen in the distal esophagus. Serial dilation was performed using 48-56 Fr Lopez dilators. Successful dilation was confirmed by the presence of mucosal disruption. STOMACH: On retroflexion, the flap-valve is classified as Hill Grade II, with a tissue fold at the lesser curvature but with periodic opening and closing around the endoscope. Copious bilous fluid and erythematous gastritis was seen in the body and antrum. Biopsies were obtained from the body and antrum for H pylori. DUODENUM: The bulb and second portions are normal. The ampulla is well-visualized endoscopically and appears normal.    PANCREAS:  The pancreas is well-visualized from head to tail. In the uncinate process there is a 6 x 6 mm complex cyst with single internal septation but no mural nodules. There may be a communication with the main pancreatic duct though this cannot be visualized. The main pancreatic duct is mildly dilated throughout much of its course measuring up to 4 mm in the head, 3 mm in the body and 1 mm in the tail. Pancreas divisum is not seen. BILIARY TREE: The gallbladder is absent. The common bile duct is well-visualized from its insertion in the ampulla to the bifurcation of right and left hepatic ducts. It is dilated, measuring up to 9 mm maximally with a gradual taper down to the level of the ampulla. There are no intraductal stones, sludge or debris. The ampulla appears normal endosonographically. OTHER ORGANS:  Views of the left lobe of the liver demonstrate no solid mass lesions. There is no ascites in the upper abdomen. The left adrenal gland appears normal. The major vascular structures including the portal vein, splenic vessels and celiac artery appear unremarkable. There are no pathologically enlarged posterior mediastinal or upper abdominal lymph nodes. Specimen:  Yes    Estimated Blood Loss:  Minimal    Implant:  None           Impression:    1. Esophageal stricture. Dilated. 2. Bile reflux gastritis. This is the most likely source of chronic nausea. 3. Dilated common bile duct. In the absence of biliary obstruction, this likely reflects benign post-cholecystectomy biliary dilatation. Dilated common bile duct. In the absence of biliary obstruction, this likely reflects benign post-cholecystectomy biliary dilatation.    4. Complex cyst in the uncinate process. No definite communication is seen with the main pancreatic duct. This is too small for FNA. No worrisome features are currently present. Plan:  1. Follow up pathology results. Treat H pylori if positive. 2. Avoid NSAIDs such as Ibuprofen and Naproxen. 3. Nexium 40 mg twice daily, taken prior to breakfast and dinner. 4. Carafate 1 g PO QAC/HS prn. I have advised a minimum of 2 doses daily for at least the next 4 weeks on a trial basis. 5. GI cocktail every 6 hours as needed. 6. MRCP in 12 months for surveillance of pancreatic cyst.  7. Return to office in 2 months.      Signed:  Eri Hooker MD  8/19/2021  10:33 AM

## 2021-08-19 NOTE — PROGRESS NOTES
65 Dr. Alayna Thurston notified of /88. Orders given for hydralazine 10mg IV.   1229 10mg IV hydralazine given.

## 2021-08-19 NOTE — ANESTHESIA POSTPROCEDURE EVALUATION
Procedure(s):  ESOPHAGOGASTRODUODENOSCOPY (EGD) / BMI 19  ENDOSCOPIC ULTRASOUND (EUS)  ESOPHAGEAL DILATION  ESOPHAGOGASTRODUODENAL (EGD) BIOPSY. total IV anesthesia    Anesthesia Post Evaluation      Multimodal analgesia: multimodal analgesia used between 6 hours prior to anesthesia start to PACU discharge  Patient location during evaluation: bedside  Patient participation: complete - patient participated  Level of consciousness: awake and responsive to light touch  Pain management: adequate  Airway patency: patent  Anesthetic complications: no  Cardiovascular status: acceptable, hemodynamically stable, blood pressure returned to baseline and stable  Respiratory status: acceptable, unassisted, spontaneous ventilation and nonlabored ventilation  Hydration status: acceptable        INITIAL Post-op Vital signs:   Vitals Value Taken Time   /74 08/19/21 1244   Temp 37.1 °C (98.8 °F) 08/19/21 1158   Pulse 104 08/19/21 1249   Resp 20 08/19/21 1240   SpO2 99 % 08/19/21 1249   Vitals shown include unvalidated device data.

## 2022-03-19 PROBLEM — K57.92 DIVERTICULITIS: Status: ACTIVE | Noted: 2018-09-24

## 2022-03-19 PROBLEM — K57.20 DIVERTICULITIS OF LARGE INTESTINE WITH ABSCESS: Status: ACTIVE | Noted: 2018-09-05

## 2022-03-19 PROBLEM — G47.9 SLEEP DISORDER: Status: ACTIVE | Noted: 2019-06-13

## 2022-03-19 PROBLEM — R10.9 ABDOMINAL PAIN: Status: ACTIVE | Noted: 2018-09-24

## 2022-03-19 PROBLEM — K57.80 DIVERTICULAR DISEASE OF INTESTINE WITH PERFORATION AND ABSCESS: Status: ACTIVE | Noted: 2017-11-17

## 2022-03-20 PROBLEM — D72.829 LEUKOCYTOSIS: Status: ACTIVE | Noted: 2018-09-24

## 2022-03-20 PROBLEM — D50.9 IRON DEFICIENCY ANEMIA: Status: ACTIVE | Noted: 2019-01-11

## 2022-03-20 PROBLEM — E53.8 VITAMIN B12 DEFICIENCY: Status: ACTIVE | Noted: 2019-02-15

## 2022-03-20 PROBLEM — K59.00 CONSTIPATION: Status: ACTIVE | Noted: 2017-11-17

## 2022-03-20 PROBLEM — T81.40XA POST-OPERATIVE INFECTION: Status: ACTIVE | Noted: 2018-09-24

## 2022-05-27 ENCOUNTER — OFFICE VISIT (OUTPATIENT)
Dept: NEUROLOGY | Age: 72
End: 2022-05-27
Payer: MEDICARE

## 2022-05-27 VITALS
WEIGHT: 119 LBS | HEART RATE: 60 BPM | BODY MASS INDEX: 19.13 KG/M2 | DIASTOLIC BLOOD PRESSURE: 70 MMHG | HEIGHT: 66 IN | SYSTOLIC BLOOD PRESSURE: 114 MMHG

## 2022-05-27 DIAGNOSIS — F02.80 DEMENTIA DUE TO PARKINSON'S DISEASE WITHOUT BEHAVIORAL DISTURBANCE (HCC): ICD-10-CM

## 2022-05-27 DIAGNOSIS — T42.8X5A MOTOR FLUCTUATIONS RELATED TO MEDICATION USE IN PARKINSON'S DISEASE (HCC): ICD-10-CM

## 2022-05-27 DIAGNOSIS — T42.8X5A LEVODOPA-INDUCED DYSKINESIA: ICD-10-CM

## 2022-05-27 DIAGNOSIS — G20 DEMENTIA DUE TO PARKINSON'S DISEASE WITHOUT BEHAVIORAL DISTURBANCE (HCC): ICD-10-CM

## 2022-05-27 DIAGNOSIS — G20 PARKINSON'S DISEASE (HCC): Primary | ICD-10-CM

## 2022-05-27 DIAGNOSIS — G20 MOTOR FLUCTUATIONS RELATED TO MEDICATION USE IN PARKINSON'S DISEASE (HCC): ICD-10-CM

## 2022-05-27 DIAGNOSIS — G24.01 LEVODOPA-INDUCED DYSKINESIA: ICD-10-CM

## 2022-05-27 DIAGNOSIS — R26.89 ABNORMALITY OF GAIT DUE TO IMPAIRMENT OF BALANCE: ICD-10-CM

## 2022-05-27 PROCEDURE — 1090F PRES/ABSN URINE INCON ASSESS: CPT | Performed by: PSYCHIATRY & NEUROLOGY

## 2022-05-27 PROCEDURE — G8400 PT W/DXA NO RESULTS DOC: HCPCS | Performed by: PSYCHIATRY & NEUROLOGY

## 2022-05-27 PROCEDURE — 1123F ACP DISCUSS/DSCN MKR DOCD: CPT | Performed by: PSYCHIATRY & NEUROLOGY

## 2022-05-27 PROCEDURE — 3017F COLORECTAL CA SCREEN DOC REV: CPT | Performed by: PSYCHIATRY & NEUROLOGY

## 2022-05-27 PROCEDURE — 99215 OFFICE O/P EST HI 40 MIN: CPT | Performed by: PSYCHIATRY & NEUROLOGY

## 2022-05-27 PROCEDURE — G8420 CALC BMI NORM PARAMETERS: HCPCS | Performed by: PSYCHIATRY & NEUROLOGY

## 2022-05-27 PROCEDURE — 4004F PT TOBACCO SCREEN RCVD TLK: CPT | Performed by: PSYCHIATRY & NEUROLOGY

## 2022-05-27 PROCEDURE — G8428 CUR MEDS NOT DOCUMENT: HCPCS | Performed by: PSYCHIATRY & NEUROLOGY

## 2022-05-27 RX ORDER — ALUMINA, MAGNESIA, AND SIMETHICONE 2400; 2400; 240 MG/30ML; MG/30ML; MG/30ML
15 SUSPENSION ORAL EVERY 6 HOURS PRN
COMMUNITY
Start: 2021-09-14

## 2022-05-27 RX ORDER — DICYCLOMINE HYDROCHLORIDE 10 MG/1
10 CAPSULE ORAL
COMMUNITY

## 2022-05-27 NOTE — PROGRESS NOTES
Frank Alejandro  [address] [place] [address] Dr, [address] [facility], [address] [place] [address]  Phone: [phone] Fax [phone]  Julieta Hamlin MD      Patient: Kevin Coronado  Provider: Julieta Hamlin MD    CC:   Chief Complaint   Patient presents with    Neurologic Problem     3 month follow up     Referring Provider:    History of Present Illness:     Kevin Coronado is a 67 y.o. RH female who presents for follow up of Parkinson's disease. She is accompanied by a caregiver. She was last seen virtually in March 2022. She presents for follow-up and continued management of Parkinson's disease diagnosed approximately 2014.  Records suggest tremors of the hands at onset with later development of generalized slowness, stiffness, and gait changes.  She was previously followed by [facility] neurology.     Of note, Ms. Rosado has been a challenging patient to manage, complicated by inconsistency with respect to medications and she is often not a very good historian when trying to describe what medications she is taking and if they are effective or not.  She has been on and off several medications in past both Parkinson's disease as well as for persistent neuropathic pain of her lower extremities.     She reports taking the following:  Sinemet  mg 0.5 to 1 tablets every 3 hours. Droxidopa 100 mg tablets (reports she only takes it usually daily or as needed if her blood pressure is low)  Xanax 0.5 mg 2 times a day as needed     Previous medication trials include: Amantadine, Gocvori, Pinky Ast been noted in the past and to my knowledge she is not taking these medications.     She presents today for follow-up. Overall it appears things have been relatively stable. She continues to take levodopa as noted above though she still can be very erratic with her dosing. Polypharmacy has been a problem in the past we have prefer to be as conservative as we can with medications.   As long as she does not take more than a tablet of her Sinemet and she has less \"shaking\" which I presume to be levodopa induced dyskinesias. Add-on therapy in general has been poorly tolerated in the past even with extended release versions of levodopa.     She currently lives alone but has a home aide for several hours a day who assists with cooking, cleaning, and personal hygiene. There has been discussion of her moving back to [place] with her daughter in the future.     She continues to have some difficulty with orthostatic hypotension and she is taking her blood pressure regularly throughout the day.  She takes it a droxidopa only as needed if her blood pressure runs low and also has as needed beta-blockers for when it runs too high.  She has a lot of confusion with respect to these medications and we spent some time today discussing what a proper regimen would be. Review of Systems:   Review of Systems   Constitutional: Negative for fatigue. HENT: Negative for congestion. Eyes: Negative for visual disturbance. Respiratory: Negative for cough. Cardiovascular: Negative for chest pain. Gastrointestinal: Positive for abdominal pain. Genitourinary: Negative for dysuria. Musculoskeletal: Positive for gait problem and myalgias. Skin: Negative for rash. Allergic/Immunologic: Negative for immunocompromised state. Neurological: Positive for dizziness, tremors and weakness. Psychiatric/Behavioral: Positive for confusion and decreased concentration. Lab/Imaging Review:   I REVIEWED PERTINENT LABS, IMAGES, AND REPORTS WITH THE PATIENT PERSONALLY, DIRECTLY AND FULLY. THE MOST PERTINENT FINDINGS ARE NOTED BELOW:    EEG January 2021:  IMPRESSION:   Mildly slow background.     CT Head January 2021:  FINDINGS:   Skull and scalp: No evidence of acute osseous abnormality.  Mastoids are aerated. Imaged paranasal sinuses are aerated. No radiopaque foreign bodies or evidence of acute abnormality.  No acute large vascular territory ischemic changes, acute hemorrhage, hydrocephalus, or mass effect.  Mild diffuse age-related atrophy. Minimal periventricular white matter low-attenuation likely reflecting gliosis from chronic small vessel ischemic change. IMPRESSION:   No CT evidence of acute intracranial abnormality.     CT Head March 2019:   FINDINGS:  The brain parenchyma appears normal in attenuation and morphology no mass infarct or hemorrhage is suggested. The ventricular system appears to be normal in overall size and morphology. The intra and extra-axial CSF spaces appear normal; no subarachnoid subdural or intraventricular hemorrhage is seen. The major visualized intracranial arterial and venous structures identified demonstrate no obvious abnormality in attenuation and morphology. The adjacent facial and orbital soft tissues visualized appear unremarkable. The visualized calvarium and facial bones appear unremarkable. IMPRESSION:  NORMAL CRANIAL CT WITHOUT CONTRAST ADMINISTRATION.     MRI Brain March 2016:   IMPRESSION:  1. MULTIPLE SMALL NONSPECIFIC T2-HYPERINTENSE FOCI IN THE PERIVENTRICULAR SUBCORTICAL WHITE MATTER OF BOTH CEREBRAL HEMISPHERES IN A RELATIVELY SYMMETRIC PATTERN WITHOUT MASS EFFECT OR RESTRICTED DIFFUSION.   SUSPECT MULTIFOCAL ISCHEMIC SEQUELA. 2. NO EVIDENCE OF HYDROCEPHALUS.  NO ATROPHY IN EXCESS OF WHAT IS EXPECTED FOR AGE. 3. NO EVIDENCE OF RECENT ISCHEMIC INSULT. Past Medical History:     Past medical history, surgical history, social history, family history, medications, and allergies were reviewed and updated as appropriate.      PAST MEDICAL HISTORY:  Past Medical History:   Diagnosis Date    Abnormal gait     freq falls    Anxiety     Chronic abdominal pain 7/5/2016    Dementia due to Parkinson's disease without behavioral disturbance ([facility] [address] 7/5/2016    Depression     managed by meds    Diverticulitis     Gastritis     GERD (gastroesophageal reflux disease)     Heart murmur     followed by [facility] cardiology. last echo10/2015    Hypercholesterolemia     Hypertension     does not require medication    Nausea & vomiting     Neurologic orthostatic hypotension (HCC)     Neuropathic pain 7/5/2016    Nontoxic multinodular goiter 7/5/2016    Parkinson's disease ([facility] [address]     dx 2014- sometimes requires walker or wheelchair when tired    Peripheral neuropathy     Syncope     per pt-- due to drop in b/p    Urinary frequency 7/5/2016    Weight loss, unintentional     20 lbs over 2 yrs     PAST SURGICAL HISTORY:   Past Surgical History:   Procedure Laterality Date    APPENDECTOMY  1974    CHOLECYSTECTOMY  2001    COLONOSCOPY N/A 8/6/2018    COLONOSCOPY/ 23 [facility]ump [facility]    COLONOSCOPY N/A 4/16/2019    COLONOSCOPY/ 18 performed by Дмитрий Moreno MD at [address] [place] [address]:  Family History   Problem Relation Age of Onset    Stroke Father     Cancer Father         prostate    Diabetes Father     Alzheimer's Disease Mother     Cancer Brother         kidney and prostate    Sickle Cell Anemia Son         2 sons     Diabetes Brother     Diabetes Sister     Breast Cancer Neg Hx       SOCIAL HISTORY:  Social History     Socioeconomic History    Marital status:       Spouse name: Not on file    Number of children: Not on file    Years of education: Not on file    Highest education level: Not on file   Occupational History    Not on file   Tobacco Use    Smoking status: Never Smoker    Smokeless tobacco: Never Used   Substance and Sexual Activity    Alcohol use: No    Drug use: No    Sexual activity: Not on file   Other Topics Concern    Not on file   Social History Narrative    Not on file     Social Determinants of Health     Financial Resource Strain:     Difficulty of Paying Living Expenses: Not on file   Food Insecurity:     Worried About Running Out of Food in the Last Year: Not on file    Ran Out of Food in the Last Year: Not on file   Transportation Needs:     Lack of Transportation (Medical): Not on file    Lack of Transportation (Non-Medical): Not on file   Physical Activity:     Days of Exercise per Week: Not on file    Minutes of Exercise per Session: Not on file   Stress:     Feeling of Stress : Not on file   Social Connections:     Frequency of Communication with Friends and Family: Not on file    Frequency of Social Gatherings with Friends and Family: Not on file    Attends Synagogue Services: Not on file    Active Member of [address] [place] [address] [employer] or Organizations: Not on file    Attends Club or Organization Meetings: Not on file    Marital Status: Not on file   Intimate Partner Violence:     Fear of Current or Ex-Partner: Not on file    Emotionally Abused: Not on file    Physically Abused: Not on file    Sexually Abused: Not on file   Housing Stability:     Unable to Pay for Housing in the Last Year: Not on file    Number of [place] in the Last Year: Not on file    Unstable Housing in the Last Year: Not on file       Medications/Allergies:     MEDICATIONS:   Outpatient Encounter Medications as of 5/27/2022   Medication Sig Dispense Refill    dicyclomine (BENTYL) 10 MG capsule Take 10 mg by mouth 4 times daily (before meals and nightly)      aluminum & magnesium hydroxide-simethicone (MYLANTA) 400-400-40 MG/5ML SUSP Take 15 mLs by mouth every 6 hours as needed      acetaminophen (TYLENOL) 500 MG tablet Take 500 mg by mouth every 8 hours as needed      ALPRAZolam (XANAX) 0.5 MG tablet Take 0.5 mg by mouth 3 times daily as needed.       carbidopa-levodopa (SINEMET)  MG per tablet Take 1.5 tablets by mouth q3hrs (7 times a day)      docusate (COLACE, DULCOLAX) 100 MG CAPS Take 100 mg by mouth daily      Droxidopa 100 MG CAPS Take 100 mg by mouth 3 times daily      esomeprazole (NEXIUM) 40 MG delayed release capsule Take 40 mg by mouth 2 times daily      famotidine (PEPCID) 20 MG tablet Take 40 mg by mouth 2 times daily      levothyroxine (SYNTHROID) 75 MCG tablet Take by mouth every morning (before breakfast)      linaclotide (LINZESS) 145 MCG capsule Take by mouth every morning (before breakfast)      loratadine (CLARITIN) 10 MG tablet Take 10 mg by mouth daily as needed      metoprolol succinate (TOPROL XL) 25 MG extended release tablet Take 12.5 mg by mouth as needed      ondansetron (ZOFRAN-ODT) 8 MG TBDP disintegrating tablet Take 8 mg by mouth every 8 hours as needed      ondansetron (ZOFRAN) 8 MG tablet Take 8 mg by mouth every 8 hours as needed      [DISCONTINUED] carbidopa-levodopa (SINEMET CR)  MG per extended release tablet Take 2 tablets by mouth       No facility-administered encounter medications on file as of 5/27/2022. ALLERGIES:  Allergies   Allergen Reactions    Metronidazole Other (See Comments)     Upset her stomach, nausea    Gabapentin Nausea Only    Gluten Meal Other (See Comments)     Pt is not allergic to gluten - gluten is the only thing she can eat - per pt  *Remove from allergy list per request of pre-assessment RN - 11/29/16    Iohexol Other (See Comments)     Stomach upset.  Morphine Other (See Comments)    Red Dye Other (See Comments)     Upset stomach.  Wheat Bran Diarrhea    Aspirin Nausea And Vomiting    Iodine Nausea And Vomiting       Physical Exam:     /70   Pulse 60   Ht 5' 6\" (1.676 m)   Wt 119 lb (54 kg)   BMI 19.21 kg/m²     General Exam:  General - Well nourished, in a wheelchair, in no apparent distress. HEENT - Normocephalic, atraumatic. Sclera anicteric. Oropharynx clear. Neck - Supple without masses  Cardiovascular - Regular rate and rhythm. No carotid bruits. Lungs - Non-labored breathing.   Abdomen - Soft, nontender, nondistended. Extremities - Peripheral pulses intact. No edema and no rashes.      Neurological Exam:      MS/Language/Speech - Alert. Oriented x 3. Language fluent. Speech shows slight hypophonia. Not much stuttering today.       Cranial Nerves - PERRL. Eye movements full with saccadic pursuits. No nystagmus. There is mildly reduced facial expression without asymmetry. Tongue and palate were midline. There is a shoulder elevation on the right as compared to the left.      Motor - There are no dyskinetic movements noted of the lower extremities today but have been noted in the past.  There is mild slowing on the right and mild to moderate slowing on the left. There is mild rigidity on the right and more mild to moderate rigidity of the left in both upper and lower extremities.     Sensory - Normal to light touch and vibration throughout.     Cerebellar - No ataxia or dysmetria.      Reflexes (R/L): Biceps (2+/2+), Triceps (2+/2+), Brachioradialis (2+/2+), Patellar (2+/2+), Ankle (0+/0+). Buckley's was negative and plantar responses were flexor.      Gait - She was able to rise from her wheelchair without much effort today. She walks with use of a Rollator but exhibits pretty adequate strides and there is no freezing. No dyskinesias. Assessment and Plan:     Camelia Santana is a 67 y.o. female who presents with the following issues:     Tl Lo was seen today for neurologic problem. Diagnoses and all orders for this visit:    Parkinson's disease ([address]    Abnormality of gait due to impairment of balance    Motor fluctuations related to medication use in Parkinson's disease ([address]    Dementia due to Parkinson's disease without behavioral disturbance ([address]    Levodopa-induced dyskinesia      Ms. Denisse Rios presents for follow-up and continued management of her Parkinson's disease which is complicated by several issues including gait and balance impairment, levodopa-induced dyskinesias, motor fluctuations related to use of levodopa, cognitive impairment likely consistent with Parkinson's disease dementia.       Her course has been complicated by significant inconsistency with respect to medication usage and timing which is always made it very challenging for us to make changes. I have always encouraged her to bring all of her medications for review.     In general when she has more consistent with her levodopa she tends to have less OFF time and less motor fluctuations. As long as she is fairly conservative with each dose and she tends to have less \"shaking\" which appears to be more levodopa induced dyskinesia. I have continue to encourage her to take Sinemet  mg 0.5 to 1 tablet every 3 hours. We spent some time today discussing this regimen.     I have also recommended that she continue droxidopa 100 mg 3 times a day and would only defer dose if her blood pressure is very high systolic blood pressure greater than 180. She has metoprolol to take as needed during these times.     She may continue Xanax 0.5 mg 3 times daily as needed for anxiety. Continue to monitor for any worsening psychosis or psychiatric disturbances given what is already likely a history of PD dementia. Follow up in 3 months. Signature: Isai Gustafson MD      Date:  5/30/2022    [facility]   [address] , [address]  Ph: [phone]  Fax: [phone]         I have personally interviewed and examined Ms. Markus Kirby and I have personally reviewed all relevant records including labs and imaging as noted above. I have written all aspects of this note. More than 50% of this time was used for counseling regarding my diagnosis, prognosis, and plans for management. Total visit time: 44 minutes.

## 2022-05-27 NOTE — PATIENT INSTRUCTIONS
Continue droxidopa 100 mg capsules. Take 1 capsule three times a day around meal times. If bp is high (top number over 180), then relax for about 30 minutes and then recheck your blood pressure. If still high, then you can take the metoprolol. Continue levodopa  mg tablets. Take either 0.5 or 1 tablet every 3 hours. Do not take more than 1 tablet. If you are feeling stiff/slow then take 1 tablet. If you are feeling ok but it is time for your next dose then you can take just 1/2 tablet. If you need some extra in the middle of the night, then ok to take an extra tablet.

## 2022-05-30 ASSESSMENT — ENCOUNTER SYMPTOMS
COUGH: 0
ABDOMINAL PAIN: 1

## 2022-06-20 ENCOUNTER — TELEPHONE (OUTPATIENT)
Dept: NEUROLOGY | Age: 72
End: 2022-06-20

## 2022-06-20 NOTE — TELEPHONE ENCOUNTER
Called and discussed. Advised that she continue her Sinemet  mg as prescribed but not to take more than a tablet at a time to avoid dyskinesias and other adverse effects.

## 2022-06-20 NOTE — TELEPHONE ENCOUNTER
Pt called and stated when she takes sinemet, she is feeling a burning sensation all over her body. She feels like she is having a hot flash. She would like to speak to Dr. Ivonne Robertson about this.

## 2022-06-23 ENCOUNTER — TELEPHONE (OUTPATIENT)
Dept: NEUROLOGY | Age: 72
End: 2022-06-23

## 2022-07-05 ENCOUNTER — TELEPHONE (OUTPATIENT)
Dept: NEUROLOGY | Age: 72
End: 2022-07-05

## 2022-07-05 NOTE — TELEPHONE ENCOUNTER
Requested Prescriptions     Signed Prescriptions Disp Refills    carbidopa-levodopa (SINEMET)  MG per tablet 210 tablet 11     Si/2-1 tab q3hrs (7 times a day)     Authorizing Provider: Kaylen Mancuso     Ordering User: Faisal Garce

## 2022-07-18 ENCOUNTER — TELEPHONE (OUTPATIENT)
Dept: NEUROLOGY | Age: 72
End: 2022-07-18

## 2022-07-18 NOTE — TELEPHONE ENCOUNTER
Pt called and stated her bp is 170/100. She has not taken her Northera today. She did take Zachery Clonts yesterday 07/17/22, due to her bp being low. Today 07/18/22 she took 1/2 tab of Metoprolol 25mg at 11 am. Pt was instructed to also contact her PCP about metoprolol guidelines. Pt is asking when she should take her Northera.

## 2022-07-19 NOTE — TELEPHONE ENCOUNTER
LUIS EDUARDOI: Discussed with patient. Continues to have very erratic blood pressures including several hypotensive episodes. She is going to back off of droxidopa to take as needed when systolic blood pressures are less than 100.

## 2022-08-01 ENCOUNTER — PREP FOR PROCEDURE (OUTPATIENT)
Dept: ADMINISTRATIVE | Age: 72
End: 2022-08-01

## 2022-08-01 RX ORDER — SODIUM CHLORIDE 0.9 % (FLUSH) 0.9 %
5-40 SYRINGE (ML) INJECTION EVERY 12 HOURS SCHEDULED
OUTPATIENT
Start: 2022-08-01

## 2022-08-01 RX ORDER — SODIUM CHLORIDE 9 MG/ML
INJECTION, SOLUTION INTRAVENOUS PRN
OUTPATIENT
Start: 2022-08-01

## 2022-08-01 RX ORDER — SODIUM CHLORIDE 0.9 % (FLUSH) 0.9 %
5-40 SYRINGE (ML) INJECTION PRN
OUTPATIENT
Start: 2022-08-01

## 2022-08-26 DIAGNOSIS — G90.3 NEUROGENIC ORTHOSTATIC HYPOTENSION (HCC): Primary | ICD-10-CM

## 2022-08-26 RX ORDER — DROXIDOPA 100 MG/1
100 CAPSULE ORAL 3 TIMES DAILY
Qty: 270 CAPSULE | Refills: 3 | Status: SHIPPED | OUTPATIENT
Start: 2022-08-26

## 2022-08-28 ASSESSMENT — ENCOUNTER SYMPTOMS
COUGH: 0
ABDOMINAL PAIN: 1

## 2022-08-28 NOTE — PROGRESS NOTES
Hilario Butts  [address], [address] [facility], [address] [place] [address]  Phone: [phone] Fax [phone]  Umer Larry MD      Patient: Arash Flro  Provider: Umer Larry MD    CC:   Chief Complaint   Patient presents with    Neurologic Problem     Parkinson's    Follow-up     Referring Provider:    History of Present Illness:     Arash Flor is a 67 y.o. RH female who presents for follow up of Parkinson's disease. She is accompanied by a caregiver. She was last seen virtually in May 2022. She presents for follow-up and continued management of Parkinson's disease diagnosed approximately 2014. Records suggest tremors of the hands at onset with later development of generalized slowness, stiffness, and gait changes. She was previously followed by [facility] neurology. Of note, Ms. Tariq Quevedo has been a challenging patient to manage, complicated by inconsistency with respect to medications and she is often not a very good historian when trying to describe what medications she is taking and if they are effective or not. She has been on and off several medications in past both Parkinson's disease as well as for persistent neuropathic pain of her lower extremities. She reports taking the following:  Sinemet  mg 0.5-1 tablets every 3 hours. Droxidopa 100 mg tablets (reports she only takes it usually daily or as needed if her blood pressure is low)  Xanax 0.5 mg 2 times a day as needed     Previous medication trials include: Amantadine, Gocvori, Nourianz, Sinemet CR have been noted in the past and to my knowledge she is not taking these medications. Patient presents today for follow-up. Overall things have been relatively stable. She continues to take levodopa as noted above and usually sticks to only half a tablet every 3 hours but sometimes will take an extra half a tablet.   Recall the polypharmacy has been a real problem in the past and she has known to be very erratic with her dosing so we have tried to be as conservative as we can with her medications. Dyskinesias do not seem to be a problem though sometimes she can have more of an internal tremor which I presume to be more of an OFF symptom. In general add-on therapy has been poorly tolerated even with extended release formulations of levodopa. She has a home aide for several hours a day who assists with basic chores including cooking, cleaning, and personal hygiene. There has been some discussion of her moving back to [place] with her daughter in the future though this will likely not happen anytime this year. Orthostatic hypotension has been a problem and has been complicated by extreme hypertension as well. She is on hydralazine currently as needed for any high systolic blood pressures. We have attempted to have her on droxidopa though she is really only taking it as needed if her blood pressure runs low. She has not taken anything on a regular basis as her blood pressure tends to run high throughout the day. There is obvious evidence of cognitive impairment consistent with a PD dementia. This does not seem to be causing any significant issues other than the fact that she can get very confused easily with her medications and I have tried to turn these over more to her home aides to avoid problems. She does also continue to have some issues with gastric pain which seems to be exacerbated with a number of medications she was on. She did recently just have an endoscopy done. Review of Systems:   Review of Systems   Constitutional:  Negative for fatigue. HENT:  Negative for congestion. Eyes:  Negative for visual disturbance. Respiratory:  Negative for cough. Cardiovascular:  Negative for chest pain. Gastrointestinal:  Positive for abdominal pain. Genitourinary:  Negative for dysuria. Musculoskeletal:  Positive for gait problem and myalgias. Skin:  Negative for rash. Allergic/Immunologic: Negative for immunocompromised state. Neurological:  Positive for dizziness, tremors and weakness. Psychiatric/Behavioral:  Positive for confusion and decreased concentration. Lab/Imaging Review:   I REVIEWED PERTINENT LABS, IMAGES, AND REPORTS WITH THE PATIENT PERSONALLY, DIRECTLY AND FULLY. THE MOST PERTINENT FINDINGS ARE NOTED BELOW:    EEG January 2021:  IMPRESSION:   Mildly slow background. CT Head January 2021:  FINDINGS:   Skull and scalp: No evidence of acute osseous abnormality. Mastoids are aerated. Imaged paranasal sinuses are aerated. No radiopaque foreign bodies or evidence of acute abnormality. No acute large vascular territory ischemic changes, acute hemorrhage, hydrocephalus, or mass effect. Mild diffuse age-related atrophy. Minimal periventricular white matter low-attenuation likely reflecting gliosis from chronic small vessel ischemic change. IMPRESSION:   No CT evidence of acute intracranial abnormality. CT Head March 2019:   FINDINGS:  The brain parenchyma appears normal in attenuation and morphology no mass infarct or hemorrhage is suggested. The ventricular system appears to be normal in overall size and morphology. The intra and extra-axial CSF spaces appear normal; no subarachnoid subdural or intraventricular hemorrhage is seen. The major visualized intracranial arterial and venous structures identified demonstrate no obvious abnormality in attenuation and morphology. The adjacent facial and orbital soft tissues visualized appear unremarkable. The visualized calvarium and facial bones appear unremarkable. IMPRESSION:  NORMAL CRANIAL CT WITHOUT CONTRAST ADMINISTRATION. MRI Brain March 2016:   IMPRESSION:  1. MULTIPLE SMALL NONSPECIFIC T2-HYPERINTENSE FOCI IN THE PERIVENTRICULAR SUBCORTICAL WHITE MATTER OF BOTH CEREBRAL HEMISPHERES IN A RELATIVELY SYMMETRIC PATTERN WITHOUT MASS EFFECT OR RESTRICTED DIFFUSION.   SUSPECT MULTIFOCAL ISCHEMIC SEQUELA. 2. NO EVIDENCE OF HYDROCEPHALUS. NO ATROPHY IN EXCESS OF WHAT IS EXPECTED FOR AGE. 3. NO EVIDENCE OF RECENT ISCHEMIC INSULT. Past Medical History:     Past medical history, surgical history, social history, family history, medications, and allergies were reviewed and updated as appropriate. PAST MEDICAL HISTORY:  Past Medical History:   Diagnosis Date    Abnormal gait     freq falls    Anxiety     Chronic abdominal pain 7/5/2016    Dementia due to Parkinson's disease without behavioral disturbance ([facility] [address] 7/5/2016    Depression     managed by meds    Diverticulitis     Gastritis     GERD (gastroesophageal reflux disease)     Heart murmur     followed by [facility] cardiology. last echo10/2015    Hypercholesterolemia     Hypertension     does not require medication    Nausea & vomiting     Neurologic orthostatic hypotension (HCC)     Neuropathic pain 7/5/2016    Nontoxic multinodular goiter 7/5/2016    Parkinson's disease ([facility] [address]     dx 2014- sometimes requires walker or wheelchair when tired    Peripheral neuropathy     Syncope     per pt-- due to drop in b/p    Urinary frequency 7/5/2016    Weight loss, unintentional     20 lbs over 2 yrs     PAST SURGICAL HISTORY:   Past Surgical History:   Procedure Laterality Date    APPENDECTOMY  1974    CHOLECYSTECTOMY  2001    COLONOSCOPY N/A 8/6/2018    COLONOSCOPY/ 23 performed by Lesley Crawford MD at [facility] ENDOSCOPY    COLONOSCOPY N/A 4/16/2019    COLONOSCOPY/ 18 performed by Renee Moreno MD at [address] [place] [address] Drive:  Family History   Problem Relation Age of Onset    Stroke Father     Cancer Father         prostate    Diabetes Father     Alzheimer's Disease Mother     Cancer Brother         kidney and prostate    Sickle Cell Anemia Son         2 sons     Diabetes Brother     Diabetes Sister     Breast Cancer Neg Hx       SOCIAL HISTORY:  Social History     Socioeconomic History    Marital status:   Tobacco Use    Smoking status: Never    Smokeless tobacco: Never   Substance and Sexual Activity    Alcohol use: No    Drug use: No       Medications/Allergies:     MEDICATIONS:   Outpatient Encounter Medications as of 8/29/2022   Medication Sig Dispense Refill    hydrALAZINE (APRESOLINE) 10 MG tablet Take 10 mg by mouth 2 times daily as needed      gabapentin (NEURONTIN) 100 MG capsule TAKE 1 CAPSULE BY MOUTH EVERY EVENING      carbidopa-levodopa (SINEMET)  MG per tablet 1/2-1 tab q3hrs (7 times a day) 210 tablet 11    dicyclomine (BENTYL) 10 MG capsule Take 10 mg by mouth 4 times daily (before meals and nightly)      aluminum & magnesium hydroxide-simethicone (MYLANTA) 400-400-40 MG/5ML SUSP Take 15 mLs by mouth every 6 hours as needed      acetaminophen (TYLENOL) 500 MG tablet Take 500 mg by mouth every 8 hours as needed      ALPRAZolam (XANAX) 0.5 MG tablet Take 0.5 mg by mouth 3 times daily as needed.       docusate (COLACE, DULCOLAX) 100 MG CAPS Take 100 mg by mouth daily      esomeprazole (NEXIUM) 40 MG delayed release capsule Take 40 mg by mouth 2 times daily      famotidine (PEPCID) 20 MG tablet Take 40 mg by mouth 2 times daily      levothyroxine (SYNTHROID) 75 MCG tablet Take by mouth every morning (before breakfast)      linaclotide (LINZESS) 145 MCG capsule Take by mouth every morning (before breakfast)      loratadine (CLARITIN) 10 MG tablet Take 10 mg by mouth daily as needed      ondansetron (ZOFRAN-ODT) 8 MG TBDP disintegrating tablet Take 8 mg by mouth every 8 hours as needed      ondansetron (ZOFRAN) 8 MG tablet Take 8 mg by mouth every 8 hours as needed      midodrine (PROAMATINE) 2.5 MG tablet Take 2.5 mg by mouth 3 times daily (Patient not taking: Reported on 8/29/2022)      Droxidopa 100 MG CAPS Take 1 capsule by mouth 3 times daily (Patient not taking: Reported on 8/29/2022) 270 capsule 3    [DISCONTINUED] metoprolol succinate (TOPROL XL) 25 MG extended release tablet Take 12.5 mg by mouth as needed (Patient not taking: Reported on 8/29/2022)       No facility-administered encounter medications on file as of 8/29/2022. ALLERGIES:  Allergies   Allergen Reactions    Metronidazole Other (See Comments)     Upset her stomach, nausea    Gabapentin Nausea Only    Gluten Meal Other (See Comments)     Pt is not allergic to gluten - gluten is the only thing she can eat - per pt  *Remove from allergy list per request of pre-assessment RN - 11/29/16    Iohexol Other (See Comments)     Stomach upset. Morphine Other (See Comments)    Red Dye Other (See Comments)     Upset stomach. Wheat Bran Diarrhea    Aspirin Nausea And Vomiting    Iodine Nausea And Vomiting       Physical Exam:     /62 (Site: Right Upper Arm, Position: Sitting)   Pulse 50   Ht 5' 6\" (1.676 m)   Wt 119 lb (54 kg)   BMI 19.21 kg/m²     General Exam:  General - Well nourished, in a wheelchair, in no apparent distress. HEENT - Normocephalic, atraumatic. Sclera anicteric. Oropharynx clear. Neck - Supple without masses  Cardiovascular - Regular rate and rhythm. No carotid bruits. Lungs - Non-labored breathing. Abdomen - Soft, nontender, nondistended. Extremities - Peripheral pulses intact. No edema and no rashes. Neurological Exam:      MS/Language/Speech - Alert. Oriented x 3. Language fluent. Speech shows slight hypophonia. Not much stuttering today. Cranial Nerves - PERRL. Eye movements full with saccadic pursuits. No nystagmus. There is mildly reduced facial expression without asymmetry. Tongue and palate were midline. There is a shoulder elevation on the right as compared to the left. Motor - There are no dyskinetic movements noted of the lower extremities today but have been noted in the past.  There is mild slowing on the right and mild to moderate slowing on the left. There is mild rigidity on the right and more mild to moderate rigidity of the left in both upper and lower extremities. Sensory - Normal to light touch and vibration throughout. Cerebellar - No ataxia or dysmetria. Reflexes (R/L): Biceps (2+/2+), Triceps (2+/2+), Brachioradialis (2+/2+), Patellar (2+/2+), Ankle (0+/0+). Buckley's was negative and plantar responses were flexor. Gait - She was able to rise from her wheelchair without much effort today. She walks with use of a Rollator but exhibits pretty adequate strides and there is no freezing. No dyskinesias. Assessment and Plan:     Evelia Gleason is a 67 y.o. female who presents with the following issues:     Mary Bingham was seen today for neurologic problem and follow-up. Diagnoses and all orders for this visit:    Parkinson's disease ([address]    Motor fluctuations related to medication use in Parkinson's disease ([address]    Abnormality of gait due to impairment of balance    Neurogenic orthostatic hypotension ([address]    Dementia due to Parkinson's disease without behavioral disturbance (HCC)    Levodopa-induced dyskinesia    Anxiety      Ms. Jewell Arias presents for follow-up and continued management of her Parkinson's disease which is complicated by several issues including gait and balance impairment, levodopa-induced dyskinesias, motor fluctuations related to use of levodopa, cognitive impairment likely consistent with Parkinson's disease dementia. Her course has been complicated by significant inconsistency with respect to medication usage and timing which is always made it very challenging for us to make changes. I have always encouraged her to bring all of her medications for review. In general when she has more consistent with her levodopa she tends to have less OFF time and less motor fluctuations. As long as she is fairly conservative with each dose and she tends to have less \"shaking\" which appears to be more levodopa induced dyskinesia. I have continue to encourage her to take Sinemet  mg 0.5-1 tablet every 3 hours.   We are attempting to keep the dose as low as possible to avoid any issues with levodopa induced dyskinesias as well as aggravating any problems with chronic gastritis/nausea. She will continue droxidopa prescribed is 100 mg 3 times a day though she usually only takes it sparingly as needed as she now has a tendency for more high systolic blood pressures. She will continue hydralazine as needed for systolic blood pressures > 180 mmHg. She may continue Xanax 0.5 mg 3 times daily as needed for anxiety. Continue to monitor for any worsening psychosis or psychiatric disturbances given what is already likely a history of PD dementia. Follow up in 3 months. Signature: Leigha Sibley MD      Date:  8/29/2022    [facility]   [address] , [address]  Ph: [phone]  Fax: [phone]         I have personally interviewed and examined Ms. Jose Martines and I have personally reviewed all relevant records including labs and imaging as noted above. I have written all aspects of this note. More than 50% of this time was used for counseling regarding my diagnosis, prognosis, and plans for management. Total visit time: 35 minutes.

## 2022-08-29 ENCOUNTER — OFFICE VISIT (OUTPATIENT)
Dept: NEUROLOGY | Age: 72
End: 2022-08-29
Payer: MEDICARE

## 2022-08-29 VITALS
SYSTOLIC BLOOD PRESSURE: 101 MMHG | WEIGHT: 119 LBS | DIASTOLIC BLOOD PRESSURE: 62 MMHG | HEART RATE: 50 BPM | HEIGHT: 66 IN | BODY MASS INDEX: 19.13 KG/M2

## 2022-08-29 DIAGNOSIS — R26.89 ABNORMALITY OF GAIT DUE TO IMPAIRMENT OF BALANCE: ICD-10-CM

## 2022-08-29 DIAGNOSIS — G20 DEMENTIA DUE TO PARKINSON'S DISEASE WITHOUT BEHAVIORAL DISTURBANCE (HCC): ICD-10-CM

## 2022-08-29 DIAGNOSIS — G90.3 NEUROGENIC ORTHOSTATIC HYPOTENSION (HCC): ICD-10-CM

## 2022-08-29 DIAGNOSIS — T42.8X5A LEVODOPA-INDUCED DYSKINESIA: ICD-10-CM

## 2022-08-29 DIAGNOSIS — G20 MOTOR FLUCTUATIONS RELATED TO MEDICATION USE IN PARKINSON'S DISEASE (HCC): ICD-10-CM

## 2022-08-29 DIAGNOSIS — F02.80 DEMENTIA DUE TO PARKINSON'S DISEASE WITHOUT BEHAVIORAL DISTURBANCE (HCC): ICD-10-CM

## 2022-08-29 DIAGNOSIS — G20 PARKINSON'S DISEASE (HCC): Primary | ICD-10-CM

## 2022-08-29 DIAGNOSIS — F41.9 ANXIETY: ICD-10-CM

## 2022-08-29 DIAGNOSIS — G24.01 LEVODOPA-INDUCED DYSKINESIA: ICD-10-CM

## 2022-08-29 DIAGNOSIS — T42.8X5A MOTOR FLUCTUATIONS RELATED TO MEDICATION USE IN PARKINSON'S DISEASE (HCC): ICD-10-CM

## 2022-08-29 PROCEDURE — 1090F PRES/ABSN URINE INCON ASSESS: CPT | Performed by: PSYCHIATRY & NEUROLOGY

## 2022-08-29 PROCEDURE — G8400 PT W/DXA NO RESULTS DOC: HCPCS | Performed by: PSYCHIATRY & NEUROLOGY

## 2022-08-29 PROCEDURE — 4004F PT TOBACCO SCREEN RCVD TLK: CPT | Performed by: PSYCHIATRY & NEUROLOGY

## 2022-08-29 PROCEDURE — G8427 DOCREV CUR MEDS BY ELIG CLIN: HCPCS | Performed by: PSYCHIATRY & NEUROLOGY

## 2022-08-29 PROCEDURE — 3017F COLORECTAL CA SCREEN DOC REV: CPT | Performed by: PSYCHIATRY & NEUROLOGY

## 2022-08-29 PROCEDURE — G8420 CALC BMI NORM PARAMETERS: HCPCS | Performed by: PSYCHIATRY & NEUROLOGY

## 2022-08-29 PROCEDURE — 99214 OFFICE O/P EST MOD 30 MIN: CPT | Performed by: PSYCHIATRY & NEUROLOGY

## 2022-08-29 PROCEDURE — 1123F ACP DISCUSS/DSCN MKR DOCD: CPT | Performed by: PSYCHIATRY & NEUROLOGY

## 2022-08-29 RX ORDER — GABAPENTIN 100 MG/1
CAPSULE ORAL
COMMUNITY
Start: 2022-08-23

## 2022-08-29 RX ORDER — HYDRALAZINE HYDROCHLORIDE 10 MG/1
10 TABLET, FILM COATED ORAL 2 TIMES DAILY PRN
COMMUNITY
Start: 2022-08-15

## 2022-08-29 RX ORDER — MIDODRINE HYDROCHLORIDE 2.5 MG/1
2.5 TABLET ORAL 3 TIMES DAILY
COMMUNITY
Start: 2022-08-17 | End: 2022-08-29

## 2022-10-11 DIAGNOSIS — G20 PARKINSON'S DISEASE (HCC): Primary | ICD-10-CM

## 2022-10-11 DIAGNOSIS — F41.9 ANXIETY: ICD-10-CM

## 2022-10-11 RX ORDER — ALPRAZOLAM 0.5 MG/1
0.5 TABLET ORAL 3 TIMES DAILY PRN
Qty: 90 TABLET | Refills: 5 | Status: SHIPPED | OUTPATIENT
Start: 2022-10-11 | End: 2023-04-09

## 2022-10-11 NOTE — TELEPHONE ENCOUNTER
Patient called needing refill on carbidopa-levodopa (SINEMET)  MG per tablet and ALPRAZolam (XANAX) 0.5 MG tablet sent to [facility] on [address]

## 2022-11-11 ENCOUNTER — TELEPHONE (OUTPATIENT)
Dept: NEUROLOGY | Age: 72
End: 2022-11-11

## 2022-11-11 NOTE — TELEPHONE ENCOUNTER
Pt called in stating that she is shaking uncontrollably and wants to know if there is any medicine she can have prescribed for Parkinson's. Please Advise.

## 2022-11-12 NOTE — TELEPHONE ENCOUNTER
Spoke to patient. She complains of \"shaking\" and it is unclear if this is an OFF related tremor or levodopa induced dyskinesias. Further description has been a little more suggestive of dyskinesia so we discussed the plan to back off on her levodopa slightly. She will try this and keep us posted. Follow-up is already arranged soon.

## 2022-11-27 NOTE — PROGRESS NOTES
END OF SHIFT NOTE: 
 
INTAKE/OUTPUT 
09/26 0701 - 09/27 0700 In: 2506 [I.V.:2506] Out: 2500 [Urine:2500] Voiding: YES Catheter: NO 
Drain:   
 
 
 
 
 
Flatus: Patient does have flatus present. Stool:  0 occurrences. Characteristics: 
Stool Assessment Stool Appearance: Soft Emesis: 0 occurrences. Characteristics: VITAL SIGNS Patient Vitals for the past 12 hrs: 
 Temp Pulse Resp BP SpO2  
09/27/18 0501 98.2 °F (36.8 °C) 70 19 128/81 97 % 09/26/18 2340 98 °F (36.7 °C) (!) 54 19 169/70 99 % 09/26/18 2005 98.3 °F (36.8 °C) 75 19 (!) 177/92 98 % Pain Assessment Pain Intensity 1: 0 (09/26/18 2029) Pain Location 1: Leg 
Pain Intervention(s) 1: Medication (see MAR) Patient Stated Pain Goal: 0 Ambulating Yes Shift report given to oncoming nurse at the bedside.  
 
Kwesi Ponce RN 
 
 
 4 = No assist / stand by assistance

## 2022-11-29 ENCOUNTER — OFFICE VISIT (OUTPATIENT)
Dept: NEUROLOGY | Age: 72
End: 2022-11-29
Payer: MEDICARE

## 2022-11-29 VITALS
SYSTOLIC BLOOD PRESSURE: 137 MMHG | HEART RATE: 80 BPM | HEIGHT: 66 IN | BODY MASS INDEX: 18.93 KG/M2 | DIASTOLIC BLOOD PRESSURE: 84 MMHG | WEIGHT: 117.8 LBS

## 2022-11-29 DIAGNOSIS — F02.80 DEMENTIA DUE TO PARKINSON'S DISEASE WITHOUT BEHAVIORAL DISTURBANCE (HCC): ICD-10-CM

## 2022-11-29 DIAGNOSIS — G20 PARKINSON'S DISEASE (HCC): Primary | ICD-10-CM

## 2022-11-29 DIAGNOSIS — G20 MOTOR FLUCTUATIONS RELATED TO MEDICATION USE IN PARKINSON'S DISEASE (HCC): ICD-10-CM

## 2022-11-29 DIAGNOSIS — T42.8X5A MOTOR FLUCTUATIONS RELATED TO MEDICATION USE IN PARKINSON'S DISEASE (HCC): ICD-10-CM

## 2022-11-29 DIAGNOSIS — R26.89 ABNORMALITY OF GAIT DUE TO IMPAIRMENT OF BALANCE: ICD-10-CM

## 2022-11-29 DIAGNOSIS — F41.9 ANXIETY: ICD-10-CM

## 2022-11-29 DIAGNOSIS — G90.3 NEUROGENIC ORTHOSTATIC HYPOTENSION (HCC): ICD-10-CM

## 2022-11-29 DIAGNOSIS — T42.8X5A LEVODOPA-INDUCED DYSKINESIA: ICD-10-CM

## 2022-11-29 DIAGNOSIS — G24.01 LEVODOPA-INDUCED DYSKINESIA: ICD-10-CM

## 2022-11-29 DIAGNOSIS — G20 DEMENTIA DUE TO PARKINSON'S DISEASE WITHOUT BEHAVIORAL DISTURBANCE (HCC): ICD-10-CM

## 2022-11-29 PROCEDURE — 99215 OFFICE O/P EST HI 40 MIN: CPT | Performed by: PSYCHIATRY & NEUROLOGY

## 2022-11-29 PROCEDURE — 3017F COLORECTAL CA SCREEN DOC REV: CPT | Performed by: PSYCHIATRY & NEUROLOGY

## 2022-11-29 PROCEDURE — 1036F TOBACCO NON-USER: CPT | Performed by: PSYCHIATRY & NEUROLOGY

## 2022-11-29 PROCEDURE — 3078F DIAST BP <80 MM HG: CPT | Performed by: PSYCHIATRY & NEUROLOGY

## 2022-11-29 PROCEDURE — 1123F ACP DISCUSS/DSCN MKR DOCD: CPT | Performed by: PSYCHIATRY & NEUROLOGY

## 2022-11-29 PROCEDURE — G8420 CALC BMI NORM PARAMETERS: HCPCS | Performed by: PSYCHIATRY & NEUROLOGY

## 2022-11-29 PROCEDURE — G8427 DOCREV CUR MEDS BY ELIG CLIN: HCPCS | Performed by: PSYCHIATRY & NEUROLOGY

## 2022-11-29 PROCEDURE — G8400 PT W/DXA NO RESULTS DOC: HCPCS | Performed by: PSYCHIATRY & NEUROLOGY

## 2022-11-29 PROCEDURE — 1090F PRES/ABSN URINE INCON ASSESS: CPT | Performed by: PSYCHIATRY & NEUROLOGY

## 2022-11-29 PROCEDURE — 3074F SYST BP LT 130 MM HG: CPT | Performed by: PSYCHIATRY & NEUROLOGY

## 2022-11-29 PROCEDURE — G8484 FLU IMMUNIZE NO ADMIN: HCPCS | Performed by: PSYCHIATRY & NEUROLOGY

## 2022-11-29 RX ORDER — LANOLIN ALCOHOL/MO/W.PET/CERES
CREAM (GRAM) TOPICAL
COMMUNITY
Start: 2022-11-02

## 2022-11-29 ASSESSMENT — ENCOUNTER SYMPTOMS
ABDOMINAL PAIN: 1
COUGH: 0

## 2022-11-29 NOTE — PROGRESS NOTES
Aaron Kee   [address], [address] [facility]  Phone: [phone] Fax [phone]  Catherine Amin MD      Patient: Bruna Blank  Provider: Catherine Amin MD    CC:   Chief Complaint   Patient presents with    Follow-up     Parkinson's Disease     Referring Provider:    History of Present Illness:     Bruna Blank is a 67 y.o. RH female who presents for follow up of Parkinson's disease. She is accompanied by her daughter. She was last seen in August 2022. She presents for follow-up and continued management of Parkinson's disease diagnosed approximately 2014. Records suggest tremors of the hands at onset with later development of generalized slowness, stiffness, and gait changes. She was previously followed by [facility] neurology. Of note, Ms. Chet Cooks has been a challenging patient to manage, complicated by inconsistency with respect to medications and she is often not a very good historian when trying to describe what medications she is taking and if they are effective or not. She has been on and off several medications in past both Parkinson's disease as well as for persistent neuropathic pain of her lower extremities. She reports taking the following:  Sinemet  mg 0.5-1 tablets every 3-4 hours. Droxidopa 100 mg tablets (reports she only takes it usually daily or as needed if her blood pressure is low)  Xanax 0.5 mg 2 times a day as needed     Previous medication trials include: Amantadine, Gocori, Nourianz, Sinemet CR have been noted in the past and to my knowledge she is not taking these medications. Patient presents today for follow-up. Unfortunately has noted some interval worsening of motor symptoms. She notes worsening shaking which is described more as a rhythmic tremor (rather than other levodopa induced dyskinesias which she has had the past) and she has noticed more bradykinesia and mobility impairment.   She is currently only taking approximately half a tablet of levodopa every 4 hours will take an extra half a tablet. Dyskinesias do not seem to be a problem at the current time. In general, add-on therapy has been poorly tolerated even with extended release formulations. She has a home aide for several hours a day who assists with basic chores including cooking, cleaning, and personal hygiene. There has been some discussion of her moving back to [place] with her daughter in the future. Orthostatic hypotension has been a problem and has been complicated by extreme hypertension as well. She is on hydralazine currently as needed for any high systolic blood pressures. We have attempted to have her on droxidopa though she is really only taking it as needed if her blood pressure runs low. She has not taken anything on a regular basis as her blood pressure tends to run high throughout the day. There is obvious evidence of cognitive impairment consistent with a PD dementia. This does not seem to be causing any significant issues other than the fact that she can get very confused easily with her medications and I have tried to turn these over more to her home aides to avoid problems. She does also continue to have some issues with gastric pain which seems to be exacerbated with a number of medications she was on. She did recently just have an endoscopy done. Review of Systems:   Review of Systems   Constitutional:  Negative for fatigue. HENT:  Negative for congestion. Eyes:  Negative for visual disturbance. Respiratory:  Negative for cough. Cardiovascular:  Negative for chest pain. Gastrointestinal:  Positive for abdominal pain. Genitourinary:  Negative for dysuria. Musculoskeletal:  Positive for gait problem and myalgias. Skin:  Negative for rash. Allergic/Immunologic: Negative for immunocompromised state. Neurological:  Positive for dizziness, tremors and weakness.    Psychiatric/Behavioral: Positive for confusion and decreased concentration. Lab/Imaging Review:   I REVIEWED PERTINENT LABS, IMAGES, AND REPORTS WITH THE PATIENT PERSONALLY, DIRECTLY AND FULLY. THE MOST PERTINENT FINDINGS ARE NOTED BELOW:    EEG January 2021:  IMPRESSION:   Mildly slow background. CT Head January 2021:  FINDINGS:   Skull and scalp: No evidence of acute osseous abnormality. Mastoids are aerated. Imaged paranasal sinuses are aerated. No radiopaque foreign bodies or evidence of acute abnormality. No acute large vascular territory ischemic changes, acute hemorrhage, hydrocephalus, or mass effect. Mild diffuse age-related atrophy. Minimal periventricular white matter low-attenuation likely reflecting gliosis from chronic small vessel ischemic change. IMPRESSION:   No CT evidence of acute intracranial abnormality. CT Head March 2019:   FINDINGS:  The brain parenchyma appears normal in attenuation and morphology no mass infarct or hemorrhage is suggested. The ventricular system appears to be normal in overall size and morphology. The intra and extra-axial CSF spaces appear normal; no subarachnoid subdural or intraventricular hemorrhage is seen. The major visualized intracranial arterial and venous structures identified demonstrate no obvious abnormality in attenuation and morphology. The adjacent facial and orbital soft tissues visualized appear unremarkable. The visualized calvarium and facial bones appear unremarkable. IMPRESSION:  NORMAL CRANIAL CT WITHOUT CONTRAST ADMINISTRATION. MRI Brain March 2016:   IMPRESSION:  1. MULTIPLE SMALL NONSPECIFIC T2-HYPERINTENSE FOCI IN THE PERIVENTRICULAR SUBCORTICAL WHITE MATTER OF BOTH CEREBRAL HEMISPHERES IN A RELATIVELY SYMMETRIC PATTERN WITHOUT MASS EFFECT OR RESTRICTED DIFFUSION. SUSPECT MULTIFOCAL ISCHEMIC SEQUELA. 2. NO EVIDENCE OF HYDROCEPHALUS. NO ATROPHY IN EXCESS OF WHAT IS EXPECTED FOR AGE. 3. NO EVIDENCE OF RECENT ISCHEMIC INSULT.       Past Medical History:     Past medical history, surgical history, social history, family history, medications, and allergies were reviewed and updated as appropriate. PAST MEDICAL HISTORY:  Past Medical History:   Diagnosis Date    Abnormal gait     freq falls    Anxiety     Chronic abdominal pain 7/5/2016    Dementia due to Parkinson's disease without behavioral disturbance ([facility] [address] 7/5/2016    Depression     managed by meds    Diverticulitis     Gastritis     GERD (gastroesophageal reflux disease)     Heart murmur     followed by [facility] cardiology. last echo10/2015    Hypercholesterolemia     Hypertension     does not require medication    Nausea & vomiting     Neurologic orthostatic hypotension (HCC)     Neuropathic pain 7/5/2016    Nontoxic multinodular goiter 7/5/2016    Parkinson's disease ([facility] [address]     dx 2014- sometimes requires walker or wheelchair when tired    Peripheral neuropathy     Syncope     per pt-- due to drop in b/p    Urinary frequency 7/5/2016    Weight loss, unintentional     20 lbs over 2 yrs     PAST SURGICAL HISTORY:   Past Surgical History:   Procedure Laterality Date    APPENDECTOMY  1974    CHOLECYSTECTOMY  2001    COLONOSCOPY N/A 8/6/2018    COLONOSCOPY/ 23 performed by Dori Robles MD at [facility] ENDOSCOPY    COLONOSCOPY N/A 4/16/2019    COLONOSCOPY/ 18 performed by Lucille Gray MD at [address] [place] [address] Drive:  Family History   Problem Relation Age of Onset    Stroke Father     Cancer Father         prostate    Diabetes Father     Alzheimer's Disease Mother     Cancer Brother         kidney and prostate    Sickle Cell Anemia Son         2 sons     Diabetes Brother     Diabetes Sister     Breast Cancer Neg Hx       SOCIAL HISTORY:  Social History     Socioeconomic History    Marital status:       Spouse name: None    Number of children: None    Years of education: None    Highest education level: None   Tobacco Use    Smoking status: Never Smokeless tobacco: Never   Substance and Sexual Activity    Alcohol use: No    Drug use: No       Medications/Allergies:     MEDICATIONS:   Outpatient Encounter Medications as of 11/29/2022   Medication Sig Dispense Refill    ALPRAZolam (XANAX) 0.5 MG tablet Take 1 tablet by mouth 3 times daily as needed for Anxiety for up to 90 days. 90 tablet 2    diclofenac sodium (VOLTAREN) 1 % GEL Apply 1-2 g topically 3 times daily as needed      hydrALAZINE (APRESOLINE) 10 MG tablet Take 10 mg by mouth 2 times daily as needed      carbidopa-levodopa (SINEMET)  MG per tablet 1/2-1 tab q3hrs (7 times a day) 210 tablet 11    dicyclomine (BENTYL) 10 MG capsule Take 10 mg by mouth 4 times daily (before meals and nightly)      aluminum & magnesium hydroxide-simethicone (MYLANTA) 400-400-40 MG/5ML SUSP Take 15 mLs by mouth every 6 hours as needed      docusate (COLACE, DULCOLAX) 100 MG CAPS Take 100 mg by mouth daily      esomeprazole (NEXIUM) 40 MG delayed release capsule Take 40 mg by mouth 2 times daily      famotidine (PEPCID) 20 MG tablet Take 40 mg by mouth 2 times daily      levothyroxine (SYNTHROID) 75 MCG tablet Take by mouth every morning (before breakfast)      loratadine (CLARITIN) 10 MG tablet Take 10 mg by mouth daily as needed      ondansetron (ZOFRAN-ODT) 8 MG TBDP disintegrating tablet Take 8 mg by mouth every 8 hours as needed      ondansetron (ZOFRAN) 8 MG tablet Take 8 mg by mouth every 8 hours as needed      vitamin B-12 (CYANOCOBALAMIN) 1000 MCG tablet TAKE 1 TABLET BY MOUTH EVERY DAY      [DISCONTINUED] ALPRAZolam (XANAX) 0.5 MG tablet Take 1 tablet by mouth 3 times daily as needed for Anxiety for up to 180 days.  90 tablet 5    gabapentin (NEURONTIN) 100 MG capsule TAKE 1 CAPSULE BY MOUTH EVERY EVENING (Patient not taking: Reported on 11/29/2022)      Droxidopa 100 MG CAPS Take 1 capsule by mouth 3 times daily (Patient not taking: No sig reported) 270 capsule 3    acetaminophen (TYLENOL) 500 MG tablet Take 500 mg by mouth every 8 hours as needed (Patient not taking: Reported on 11/29/2022)      linaclotide (LINZESS) 145 MCG capsule Take by mouth every morning (before breakfast) (Patient not taking: Reported on 11/29/2022)       No facility-administered encounter medications on file as of 11/29/2022. ALLERGIES:  Allergies   Allergen Reactions    Metronidazole Other (See Comments)     Upset her stomach, nausea    Gabapentin Nausea Only    Gluten Meal Other (See Comments)     Pt is not allergic to gluten - gluten is the only thing she can eat - per pt  *Remove from allergy list per request of pre-assessment RN - 11/29/16    Iohexol Other (See Comments)     Stomach upset. Morphine Other (See Comments)    Red Dye Other (See Comments)     Upset stomach. Wheat Bran Diarrhea    Aspirin Nausea And Vomiting    Iodine Nausea And Vomiting       Physical Exam:     /84 (Site: Right Upper Arm, Position: Sitting)   Pulse 80   Ht 5' 6\" (1.676 m)   Wt 117 lb 12.8 oz (53.4 kg)   BMI 19.01 kg/m²     General Exam:  General - Well nourished, in a wheelchair, in no apparent distress. HEENT - Normocephalic, atraumatic. Sclera anicteric. Oropharynx clear. Neck - Supple without masses  Cardiovascular - Regular rate and rhythm. No carotid bruits. Lungs - Non-labored breathing. Abdomen - Soft, nontender, nondistended. Extremities - Peripheral pulses intact. No edema and no rashes. Neurological Exam:      MS/Language/Speech - Alert. Oriented x 3. Language fluent. Speech shows slight hypophonia. Not much stuttering today. Cranial Nerves - PERRL. Eye movements full with saccadic pursuits. No nystagmus. There is mildly reduced facial expression without asymmetry. Tongue and palate were midline. There is a shoulder elevation on the right as compared to the left.       Motor - There are no dyskinetic movements noted of the lower extremities today but have been noted in the past.  There is moderate slowing on the right and moderate slowing on the left. There is moderate rigidity on the right and moderate rigidity of the left in both upper and lower extremities. No resting tremors. Sensory - Normal to light touch and vibration throughout. Cerebellar - No ataxia or dysmetria. Reflexes (R/L): Biceps (2+/2+), Triceps (2+/2+), Brachioradialis (2+/2+), Patellar (2+/2+), Ankle (0+/0+). Buckley's was negative and plantar responses were flexor. Gait - She had difficulty rising from her wheelchair and required assistance. She was unable to take a few shuffled steps for me around the room and there was significant freezing especially with turns. Assessment and Plan:     Arsenio Chen is a 67 y.o. female who presents with the following issues:     Maegan Young was seen today for follow-up. Diagnoses and all orders for this visit:    Parkinson's disease ([facility] [address]    Motor fluctuations related to medication use in Parkinson's disease ([facility] [address]    Abnormality of gait due to impairment of balance    Neurogenic orthostatic hypotension ([facility] [address]    Dementia due to Parkinson's disease without behavioral disturbance (HCC)    Anxiety  -     ALPRAZolam (XANAX) 0.5 MG tablet; Take 1 tablet by mouth 3 times daily as needed for Anxiety for up to 90 days. Levodopa-induced dyskinesia      Ms. Chaparrita Goode presents for follow-up and continued management of her Parkinson's disease which is complicated by several issues including gait and balance impairment, levodopa-induced dyskinesias, motor fluctuations related to use of levodopa, cognitive impairment likely consistent with Parkinson's disease dementia. Currently she is experiencing more OFF symptoms and I suspect she is now more underdosed with respect to her levodopa. I recommended that she take Sinemet  mg approximately every 3 hours (currently she is doing about every 4) and leaning more towards 1 tablet every 3 hours.   If she begins to experience any problems with dyskinesias and she can back off to half a tablet. Patient voiced her understanding of the plan. Add-on therapy with amantadine and Gocovri has overall been poorly tolerated. Note that her care has been complicated by significant inconsistency with respect to medication usage and timing which is always made it very challenging for us to make changes. I have always encouraged her to bring all of her medications for review. She will continue droxidopa prescribed is 100 mg 3 times a day though she usually only takes it sparingly as needed as she now has a tendency for more high systolic blood pressures. She will continue hydralazine as needed for systolic blood pressures > 180 mmHg. She may continue Xanax 0.5 mg 3 times daily as needed for anxiety. Continue to monitor for any worsening psychosis or psychiatric disturbances given what is already likely a history of PD dementia. Follow up in 3 months. Signature: Remigio Morales MD      Date:  12/1/2022    [facility]   [facility] [address] [place] [address]  Ph: [phone]  Fax: [phone]         I have personally interviewed and examined Ms. Han Quigley and I have personally reviewed all relevant records including labs and imaging as noted above. I have written all aspects of this note. More than 50% of this time was used for counseling regarding my diagnosis, prognosis, and plans for management. Total visit time: 42 minutes.

## 2022-12-01 RX ORDER — ALPRAZOLAM 0.5 MG/1
0.5 TABLET ORAL 3 TIMES DAILY PRN
Qty: 90 TABLET | Refills: 2 | Status: SHIPPED | OUTPATIENT
Start: 2022-12-01 | End: 2023-03-01

## 2022-12-07 ENCOUNTER — TELEPHONE (OUTPATIENT)
Dept: NEUROLOGY | Age: 72
End: 2022-12-07

## 2022-12-14 ENCOUNTER — TELEPHONE (OUTPATIENT)
Dept: NEUROLOGY | Age: 72
End: 2022-12-14

## 2022-12-14 NOTE — TELEPHONE ENCOUNTER
Patient called in stating that her legs are in so much pain at night that she cannot sleep due to her necropathy. Patient also states that she has had trouble walking lately. States that she has had botox in her foot before which has helped and is wondering if she can resume that treatment. Patient is also requesting a referral to psychiatry. Pls adv.

## 2022-12-20 ENCOUNTER — TELEPHONE (OUTPATIENT)
Dept: NEUROLOGY | Age: 72
End: 2022-12-20

## 2022-12-20 NOTE — TELEPHONE ENCOUNTER
Patient called multiple times. She has checked with her new insurance company and they do cover Botox, with Dr Romelia Sarabia, for her foot.   She is waiting for a call to give her new insurance information

## 2022-12-21 NOTE — TELEPHONE ENCOUNTER
We have discussed it so we can set her up.     ICD/Diagnosis: Focal Dystonia  Expected Toxin/Dose: Botox 200 units  CPT: 57392, 36205 (bilateral legs), with EMG

## 2023-01-11 ENCOUNTER — TELEPHONE (OUTPATIENT)
Dept: NEUROLOGY | Age: 73
End: 2023-01-11

## 2023-01-11 DIAGNOSIS — G24.9 DYSTONIA: Primary | ICD-10-CM

## 2023-01-26 ENCOUNTER — OFFICE VISIT (OUTPATIENT)
Dept: NEUROLOGY | Age: 73
End: 2023-01-26

## 2023-01-26 VITALS
DIASTOLIC BLOOD PRESSURE: 88 MMHG | BODY MASS INDEX: 18.8 KG/M2 | SYSTOLIC BLOOD PRESSURE: 142 MMHG | WEIGHT: 117 LBS | HEART RATE: 72 BPM | HEIGHT: 66 IN

## 2023-01-26 DIAGNOSIS — R26.89 ABNORMALITY OF GAIT DUE TO IMPAIRMENT OF BALANCE: ICD-10-CM

## 2023-01-26 DIAGNOSIS — T42.8X5A MOTOR FLUCTUATIONS RELATED TO MEDICATION USE IN PARKINSON'S DISEASE (HCC): ICD-10-CM

## 2023-01-26 DIAGNOSIS — T42.8X5A LEVODOPA-INDUCED DYSKINESIA: ICD-10-CM

## 2023-01-26 DIAGNOSIS — G90.3 NEUROGENIC ORTHOSTATIC HYPOTENSION (HCC): ICD-10-CM

## 2023-01-26 DIAGNOSIS — G20 MOTOR FLUCTUATIONS RELATED TO MEDICATION USE IN PARKINSON'S DISEASE (HCC): ICD-10-CM

## 2023-01-26 DIAGNOSIS — F02.80 DEMENTIA DUE TO PARKINSON'S DISEASE WITHOUT BEHAVIORAL DISTURBANCE (HCC): ICD-10-CM

## 2023-01-26 DIAGNOSIS — G20 PARKINSON'S DISEASE (HCC): Primary | ICD-10-CM

## 2023-01-26 DIAGNOSIS — G24.01 LEVODOPA-INDUCED DYSKINESIA: ICD-10-CM

## 2023-01-26 DIAGNOSIS — G24.9 DYSTONIA: ICD-10-CM

## 2023-01-26 DIAGNOSIS — G20 DEMENTIA DUE TO PARKINSON'S DISEASE WITHOUT BEHAVIORAL DISTURBANCE (HCC): ICD-10-CM

## 2023-01-26 ASSESSMENT — ENCOUNTER SYMPTOMS
COUGH: 0
ABDOMINAL PAIN: 1

## 2023-01-26 NOTE — PROGRESS NOTES
Isaiah Polo  [address], [address] [facility], [address] [place] [address]  Phone: [phone] Fax [phone]  Jazz Hagen MD      Patient: Dread Charles  Provider: Jazz Hagen MD    CC:   Chief Complaint   Patient presents with    Follow-up    Neurologic Problem    Botox Injection       Referring Provider:    History of Present Illness:     Dread Charles is a 67 y.o. RH female who presents for follow up of Parkinson's disease and chemodenervation for focal dystonia in the lower extremities. She is accompanied by her daughter. She was last seen in November 2022. She presents for follow-up and continued management of Parkinson's disease diagnosed approximately 2014. Records suggest tremors of the hands at onset with later development of generalized slowness, stiffness, and gait changes. She was previously followed by [facility] neurology. Of note, Ms. Katina Resendiz has been a challenging patient to manage, complicated by inconsistency with respect to medications and she is often not a very good historian when trying to describe what medications she is taking and if they are effective or not. She has been on and off several medications in past both for Parkinson's disease as well as for persistent neuropathic pain of her lower extremities. She reports taking the following:  Sinemet  mg 0.5-1 tablets every 3-4 hours. Droxidopa 100 mg tablets (reports she only takes it usually daily or as needed if her blood pressure is low)  Xanax 0.5 mg 2 times a day as needed     Previous medication trials include: Amantadine, Gocori, Nourianz, Sinemet CR have been noted in the past and to my knowledge she is not taking these medications. Patient presents today for follow-up. Plan for today is chemodenervation for focal dystonia affecting the distal lower extremities. She has dystonic-like cramping with curling of the toes and ankle inversion.   Cramps can be very painful and cause her persistent discomfort. This will be her first injection. Merits and limitations of injections were discussed with her today. With respect to her Parkinson's disease there continues to be motor fluctuations throughout the day. At times during the day she may experience more of a rhythmic shaking and at other times during the day she may experience other movements more consistent with levodopa induced dyskinesias. She continues to take Sinemet as noted above usually 0.5 to 1 tablet every 3-4 hours depending on how she is feeling so the overall total daily dosage may very depending on her symptoms that day. Dyskinesias do not seem to be a very significant problem at the current time. In general, add-on therapy has been poorly tolerated and she is at great risk for adherence due to confusion with medications and so we have attempted to simplify her regimen is much as possible. She has a home aide for several hours a day who assists with basic chores including cooking, cleaning, and personal hygiene. There has been some discussion of her moving back to [place] with her daughter in the future but it does not appear that anything is planned at the current time. Orthostatic hypotension has been a problem and has been complicated by extreme hypertension as well. Currently she has had more issues with high systolic blood pressures and so she has not been taking droxidopa currently. She is on hydralazine currently as needed for any high systolic blood pressures. There is obvious evidence of cognitive impairment consistent with a PD dementia. This does not seem to be causing any significant issues other than the fact that she can get very confused easily with her medications and I have tried to turn these over more to her home aides to avoid problems. No neuropsychiatric disturbances such as hallucinations or any other behavioral agitation.       She does also continue to have some issues with gastric pain which seems to be exacerbated with a number of medications she was on. She did recently just have an endoscopy done has continued follow-up with gastroenterology. Review of Systems:   Review of Systems   Constitutional:  Negative for fatigue. HENT:  Negative for congestion. Eyes:  Negative for visual disturbance. Respiratory:  Negative for cough. Cardiovascular:  Negative for chest pain. Gastrointestinal:  Positive for abdominal pain. Genitourinary:  Negative for dysuria. Musculoskeletal:  Positive for gait problem and myalgias. Skin:  Negative for rash. Allergic/Immunologic: Negative for immunocompromised state. Neurological:  Positive for dizziness, tremors and weakness. Psychiatric/Behavioral:  Positive for confusion and decreased concentration. Lab/Imaging Review:   I REVIEWED PERTINENT LABS, IMAGES, AND REPORTS WITH THE PATIENT PERSONALLY, DIRECTLY AND FULLY. THE MOST PERTINENT FINDINGS ARE NOTED BELOW:    EEG January 2021:  IMPRESSION:   Mildly slow background. CT Head January 2021:  FINDINGS:   Skull and scalp: No evidence of acute osseous abnormality. Mastoids are aerated. Imaged paranasal sinuses are aerated. No radiopaque foreign bodies or evidence of acute abnormality. No acute large vascular territory ischemic changes, acute hemorrhage, hydrocephalus, or mass effect. Mild diffuse age-related atrophy. Minimal periventricular white matter low-attenuation likely reflecting gliosis from chronic small vessel ischemic change. IMPRESSION:   No CT evidence of acute intracranial abnormality. CT Head March 2019:   FINDINGS:  The brain parenchyma appears normal in attenuation and morphology no mass infarct or hemorrhage is suggested. The ventricular system appears to be normal in overall size and morphology. The intra and extra-axial CSF spaces appear normal; no subarachnoid subdural or intraventricular hemorrhage is seen.   The major visualized intracranial arterial and venous structures identified demonstrate no obvious abnormality in attenuation and morphology. The adjacent facial and orbital soft tissues visualized appear unremarkable. The visualized calvarium and facial bones appear unremarkable. IMPRESSION:  NORMAL CRANIAL CT WITHOUT CONTRAST ADMINISTRATION. MRI Brain March 2016:   IMPRESSION:  1. MULTIPLE SMALL NONSPECIFIC T2-HYPERINTENSE FOCI IN THE PERIVENTRICULAR SUBCORTICAL WHITE MATTER OF BOTH CEREBRAL HEMISPHERES IN A RELATIVELY SYMMETRIC PATTERN WITHOUT MASS EFFECT OR RESTRICTED DIFFUSION. SUSPECT MULTIFOCAL ISCHEMIC SEQUELA. 2. NO EVIDENCE OF HYDROCEPHALUS. NO ATROPHY IN EXCESS OF WHAT IS EXPECTED FOR AGE. 3. NO EVIDENCE OF RECENT ISCHEMIC INSULT. Past Medical History:     Past medical history, surgical history, social history, family history, medications, and allergies were reviewed and updated as appropriate. PAST MEDICAL HISTORY:  Past Medical History:   Diagnosis Date    Abnormal gait     freq falls    Anxiety     Chronic abdominal pain 7/5/2016    Dementia due to Parkinson's disease without behavioral disturbance ([address] 7/5/2016    Depression     managed by meds    Diverticulitis     Gastritis     GERD (gastroesophageal reflux disease)     Heart murmur     followed by [facility] cardiology.  last echo10/2015    Hypercholesterolemia     Hypertension     does not require medication    Nausea & vomiting     Neurologic orthostatic hypotension (HCC)     Neuropathic pain 7/5/2016    Nontoxic multinodular goiter 7/5/2016    Parkinson's disease ([address]     dx 2014- sometimes requires walker or wheelchair when tired    Peripheral neuropathy     Syncope     per pt-- due to drop in b/p    Urinary frequency 7/5/2016    Weight loss, unintentional     20 lbs over 2 yrs     PAST SURGICAL HISTORY:   Past Surgical History:   Procedure Laterality Date    APPENDECTOMY  1974    CHOLECYSTECTOMY  2001    COLONOSCOPY N/A 8/6/2018    COLONOSCOPY/ 19 performed by Katie Holden MD at [facility]COPY    COLONOSCOPY N/A 4/16/2019    COLONOSCOPY/ 25 performed by Bruce Estrella MD at [address] [place] [address] HISTORY:  Family History   Problem Relation Age of Onset    Stroke Father     Cancer Father         prostate    Diabetes Father     Alzheimer's Disease Mother     Cancer Brother         kidney and prostate    Sickle Cell Anemia Son         2 sons     Diabetes Brother     Diabetes Sister     Breast Cancer Neg Hx       SOCIAL HISTORY:  Social History     Socioeconomic History    Marital status:      Spouse name: None    Number of children: None    Years of education: None    Highest education level: None   Tobacco Use    Smoking status: Never    Smokeless tobacco: Never   Substance and Sexual Activity    Alcohol use: No    Drug use: No       Medications/Allergies:     MEDICATIONS:   Outpatient Encounter Medications as of 1/26/2023   Medication Sig Dispense Refill    ALPRAZolam (XANAX) 0.5 MG tablet Take 1 tablet by mouth 3 times daily as needed for Anxiety for up to 90 days.  90 tablet 2    vitamin B-12 (CYANOCOBALAMIN) 1000 MCG tablet TAKE 1 TABLET BY MOUTH EVERY DAY      diclofenac sodium (VOLTAREN) 1 % GEL Apply 1-2 g topically 3 times daily as needed      hydrALAZINE (APRESOLINE) 10 MG tablet Take 10 mg by mouth 2 times daily as needed      gabapentin (NEURONTIN) 100 MG capsule TAKE 1 CAPSULE BY MOUTH EVERY EVENING (Patient not taking: Reported on 11/29/2022)      Droxidopa 100 MG CAPS Take 1 capsule by mouth 3 times daily (Patient not taking: No sig reported) 270 capsule 3    carbidopa-levodopa (SINEMET)  MG per tablet 1/2-1 tab q3hrs (7 times a day) 210 tablet 11    dicyclomine (BENTYL) 10 MG capsule Take 10 mg by mouth 4 times daily (before meals and nightly)      aluminum & magnesium hydroxide-simethicone (MYLANTA) 400-400-40 MG/5ML SUSP Take 15 mLs by mouth every 6 hours as needed      acetaminophen (TYLENOL) 500 MG tablet Take 500 mg by mouth every 8 hours as needed (Patient not taking: Reported on 2022)      docusate (COLACE, DULCOLAX) 100 MG CAPS Take 100 mg by mouth daily      esomeprazole (NEXIUM) 40 MG delayed release capsule Take 40 mg by mouth 2 times daily      famotidine (PEPCID) 20 MG tablet Take 40 mg by mouth 2 times daily      levothyroxine (SYNTHROID) 75 MCG tablet Take by mouth every morning (before breakfast)      linaclotide (LINZESS) 145 MCG capsule Take by mouth every morning (before breakfast) (Patient not taking: Reported on 2022)      loratadine (CLARITIN) 10 MG tablet Take 10 mg by mouth daily as needed      ondansetron (ZOFRAN-ODT) 8 MG TBDP disintegrating tablet Take 8 mg by mouth every 8 hours as needed      ondansetron (ZOFRAN) 8 MG tablet Take 8 mg by mouth every 8 hours as needed      [] Onabotulinumtoxin A (BOTOX) injection 200 Units        No facility-administered encounter medications on file as of 2023. ALLERGIES:  Allergies   Allergen Reactions    Metronidazole Other (See Comments)     Upset her stomach, nausea    Gabapentin Nausea Only    Gluten Meal Other (See Comments)     Pt is not allergic to gluten - gluten is the only thing she can eat - per pt  *Remove from allergy list per request of pre-assessment RN - 16    Iohexol Other (See Comments)     Stomach upset. Morphine Other (See Comments)    Red Dye Other (See Comments)     Upset stomach. Wheat Bran Diarrhea    Aspirin Nausea And Vomiting    Iodine Nausea And Vomiting       Physical Exam:     BP (!) 142/88   Pulse 72   Ht 5' 6\" (1.676 m)   Wt 117 lb (53.1 kg)   BMI 18.88 kg/m²     General Exam:  General - Well nourished, in a wheelchair, in no apparent distress. HEENT - Normocephalic, atraumatic. Sclera anicteric. Oropharynx clear. Neck - Supple without masses  Cardiovascular - Regular rate and rhythm. No carotid bruits. Lungs - Non-labored breathing.    Abdomen - Soft, nontender, nondistended. Extremities - Peripheral pulses intact. No edema and no rashes. Neurological Exam:      MS/Language/Speech - Alert. Oriented x 3. Language fluent. Speech shows slight hypophonia. Not much stuttering today. Cranial Nerves - PERRL. Eye movements full with saccadic pursuits. No nystagmus. There is mildly reduced facial expression without asymmetry. Tongue and palate were midline. There is a shoulder elevation on the right as compared to the left. Motor - There are no dyskinetic movements noted of the lower extremities today but have been noted in the past.  There is mild to moderate slowing on the right and moderate slowing on the left. There is mild rigidity on the right and mild to moderate rigidity of the left in both upper and lower extremities. Only brief intermittent resting tremors of the lower extremities were noted. Sensory - Normal to light touch and vibration throughout. Cerebellar - No ataxia or dysmetria. Reflexes (R/L): Biceps (2+/2+), Triceps (2+/2+), Brachioradialis (2+/2+), Patellar (2+/2+), Ankle (0+/0+). Buckley's was negative and plantar responses were flexor. Gait - She had difficulty rising from her wheelchair and required assistance. She was able to take a steps for me around the room. No freezing noted at the current time. Assessment and Plan:     Tova Luciano is a 67 y.o. female who presents with the following issues:     Yolette Roberts was seen today for follow-up, neurologic problem and botox injection.     Diagnoses and all orders for this visit:    Park[facility]'s disease ([facility]    Dystonia  -     Onabotulinumtoxin A (BOTOX) injection 200 Units  -     22180 - Chemodenervation of one extremity muscles: 1 -4  muscles  -     97971 - Each additonal extremity: 1-4 muscles  -     30492 - EMG, chemodenervation    Motor fluctuations related to medication use in Parkinson's disease ([facility]    Abnormality of gait due to impairment of balance    Neurogenic orthostatic hypotension (HCC)    Dementia due to Parkinson's disease without behavioral disturbance (HCC)    Levodopa-induced dyskinesia      Ms. Neelima Schafer presents for follow-up and continued management of her Parkinson's disease which is complicated by several issues including gait and balance impairment, levodopa-induced dyskinesias, motor fluctuations related to use of levodopa, cognitive impairment likely consistent with Parkinson's disease dementia, and focal dystonia of the lower extremities. Botox injections today to the lower extremities for focal dystonic cramping. This will be her first injection. She will continue Sinemet  mg approximately 1 tablet every 3 hours. However we have discussed if she begins having more problems with dyskinesias that she can back off to 0.5 tablets. Recall that add-on therapy with amantadine and Gocovri has overall been poorly tolerated in addition to other formulations of levodopa such as extended release. Note that her care has been complicated by significant inconsistency with respect to medication usage and timing which is always made it very challenging for us to make changes. I have always encouraged her to bring all of her medications for review. She will continue droxidopa prescribed is 100 mg 3 times a day though she usually only takes it sparingly as needed as she now has a tendency for more high systolic blood pressures. She will continue hydralazine as needed for systolic blood pressures > 180 mmHg. She may continue Xanax 0.5 mg 3 times daily as needed for anxiety. Continue to monitor for any worsening psychosis or psychiatric disturbances given what is already likely a history of PD dementia. Follow up in 3 months. Procedure Note:     Informed consent was obtained after the potential risks and benefits have been explained to the patient.   Potential risks include:  Pain, bruising, bleeding, infection, flu-like symptoms, over-weakening of injected or adjacent muscles and swallowing dysfunction. Patient was given the botulinum toxin medication guide according to FDA standards. Procedure:  Using a 30 gauge 1.4 inch hollow lumen recording needle on a tuberculin syringe was used to inject bolutinum toxin in the muscles noted below, The following muscles were sampled utilizing EMG and injected as noted:       Injection:                                                        R tibialis posterior  50 units  R flexor digitorum brevis 30 units    L tibialis posterior  50 units  L flexor digitorum brevis 30 units       Total injected: 160 units BOTOX 100units/1 cc under EMG guidance. Waste: 40 units     Comments: There were no complications. The patient tolerated the procedure well. Procedure Time: 15 minutes      Signature: Bobby Ribeiro MD      Date:  1/26/2023    [facility]   [address]  Ph: [phone]  Fax: [phone]         I have personally interviewed and examined Ms. Mala Rodriguez and I have personally reviewed all relevant records including labs and imaging as noted above. I have written all aspects of this note. More than 50% of this time was used for counseling regarding my diagnosis, prognosis, and plans for management. Procedure (Botox) Time: 15 minutes  Total visit time (including procedure): 50 minutes.

## 2023-02-13 ENCOUNTER — TELEPHONE (OUTPATIENT)
Dept: NEUROLOGY | Age: 73
End: 2023-02-13

## 2023-02-13 NOTE — TELEPHONE ENCOUNTER
Pt called and I spoke with her, she had Botox on 1/16/23 and now she is having pain and she is wondering how long it should take for the Botox to work? She is wanting to know what Quetiapine 25 mg is for? Her daughter found the bottle under the dresser and the pt does not remember if she took this or not? Her next OV is 3/15/23.

## 2023-02-13 NOTE — TELEPHONE ENCOUNTER
Let patient know that Botox is usually most effective in 3-4 weeks. But if not effective then we can increase the dose at the next visit. To my knowledge, I have not sent her any prescriptions for quetiapine and was not aware she was taking it. This medication is usually used for sleep. I would recommend they not give it to her and we can discuss at her next follow-up appointment.

## 2023-02-13 NOTE — TELEPHONE ENCOUNTER
Patient called stating that when she eats and takes her PD medication that she has a burning sensation in her throat, chest and stomach. She has taken Zofran but that doesn't help. Is there something else she can take?

## 2023-02-14 NOTE — TELEPHONE ENCOUNTER
Spoke to patient. We will continue medications until her next follow up. Advised her to discuss her continued epigastric pain with gastroenterology.

## 2023-03-15 ENCOUNTER — OFFICE VISIT (OUTPATIENT)
Dept: NEUROLOGY | Age: 73
End: 2023-03-15
Payer: COMMERCIAL

## 2023-03-15 VITALS
SYSTOLIC BLOOD PRESSURE: 114 MMHG | HEIGHT: 66 IN | WEIGHT: 111 LBS | DIASTOLIC BLOOD PRESSURE: 72 MMHG | BODY MASS INDEX: 17.84 KG/M2 | OXYGEN SATURATION: 98 % | HEART RATE: 56 BPM

## 2023-03-15 DIAGNOSIS — T42.8X5A MOTOR FLUCTUATIONS RELATED TO MEDICATION USE IN PARKINSON'S DISEASE (HCC): ICD-10-CM

## 2023-03-15 DIAGNOSIS — T42.8X5A LEVODOPA-INDUCED DYSKINESIA: ICD-10-CM

## 2023-03-15 DIAGNOSIS — F41.9 ANXIETY: ICD-10-CM

## 2023-03-15 DIAGNOSIS — G20 PARKINSON'S DISEASE (HCC): Primary | ICD-10-CM

## 2023-03-15 DIAGNOSIS — G24.01 LEVODOPA-INDUCED DYSKINESIA: ICD-10-CM

## 2023-03-15 DIAGNOSIS — G24.9 DYSTONIA: ICD-10-CM

## 2023-03-15 DIAGNOSIS — G20 MOTOR FLUCTUATIONS RELATED TO MEDICATION USE IN PARKINSON'S DISEASE (HCC): ICD-10-CM

## 2023-03-15 DIAGNOSIS — G90.3 NEUROGENIC ORTHOSTATIC HYPOTENSION (HCC): ICD-10-CM

## 2023-03-15 DIAGNOSIS — F02.80 DEMENTIA DUE TO PARKINSON'S DISEASE WITHOUT BEHAVIORAL DISTURBANCE (HCC): ICD-10-CM

## 2023-03-15 DIAGNOSIS — R26.89 ABNORMALITY OF GAIT DUE TO IMPAIRMENT OF BALANCE: ICD-10-CM

## 2023-03-15 DIAGNOSIS — G20 DEMENTIA DUE TO PARKINSON'S DISEASE WITHOUT BEHAVIORAL DISTURBANCE (HCC): ICD-10-CM

## 2023-03-15 PROCEDURE — G8427 DOCREV CUR MEDS BY ELIG CLIN: HCPCS | Performed by: PSYCHIATRY & NEUROLOGY

## 2023-03-15 PROCEDURE — G8484 FLU IMMUNIZE NO ADMIN: HCPCS | Performed by: PSYCHIATRY & NEUROLOGY

## 2023-03-15 PROCEDURE — 1036F TOBACCO NON-USER: CPT | Performed by: PSYCHIATRY & NEUROLOGY

## 2023-03-15 PROCEDURE — G8400 PT W/DXA NO RESULTS DOC: HCPCS | Performed by: PSYCHIATRY & NEUROLOGY

## 2023-03-15 PROCEDURE — G8419 CALC BMI OUT NRM PARAM NOF/U: HCPCS | Performed by: PSYCHIATRY & NEUROLOGY

## 2023-03-15 PROCEDURE — 3017F COLORECTAL CA SCREEN DOC REV: CPT | Performed by: PSYCHIATRY & NEUROLOGY

## 2023-03-15 PROCEDURE — 3074F SYST BP LT 130 MM HG: CPT | Performed by: PSYCHIATRY & NEUROLOGY

## 2023-03-15 PROCEDURE — 1090F PRES/ABSN URINE INCON ASSESS: CPT | Performed by: PSYCHIATRY & NEUROLOGY

## 2023-03-15 PROCEDURE — 1123F ACP DISCUSS/DSCN MKR DOCD: CPT | Performed by: PSYCHIATRY & NEUROLOGY

## 2023-03-15 PROCEDURE — 99215 OFFICE O/P EST HI 40 MIN: CPT | Performed by: PSYCHIATRY & NEUROLOGY

## 2023-03-15 PROCEDURE — 3078F DIAST BP <80 MM HG: CPT | Performed by: PSYCHIATRY & NEUROLOGY

## 2023-03-15 RX ORDER — ONDANSETRON 4 MG/1
TABLET, ORALLY DISINTEGRATING ORAL
COMMUNITY
Start: 2023-03-08

## 2023-03-15 RX ORDER — LINACLOTIDE 72 UG/1
CAPSULE, GELATIN COATED ORAL
COMMUNITY
Start: 2023-03-12

## 2023-03-15 RX ORDER — SUCRALFATE ORAL 1 G/10ML
SUSPENSION ORAL
COMMUNITY
Start: 2023-03-08

## 2023-03-15 RX ORDER — ALPRAZOLAM 0.5 MG/1
TABLET ORAL
COMMUNITY
Start: 2023-03-10

## 2023-03-15 RX ORDER — MIRTAZAPINE 7.5 MG/1
TABLET, FILM COATED ORAL
COMMUNITY
Start: 2023-01-01

## 2023-03-15 ASSESSMENT — ENCOUNTER SYMPTOMS
COUGH: 0
ABDOMINAL PAIN: 1

## 2023-03-15 NOTE — PROGRESS NOTES
[address], [address] [facility], [address] [place] [address]  Phone: [phone] Fax [phone]  Joselyn Chu MD      Patient: Austin Sneed  Provider: Joselyn Chu MD    CC:   Chief Complaint   Patient presents with    Follow-up     Parkinson's disease      Referring Provider:    History of Present Illness:     Austin Sneed is a 67 y.o. RH female who presents for follow up of Parkinson's disease. She is accompanied by her daughter. She was last seen January 2023 for chemodenervation (lower extremity focal dystonia). She presents for follow-up and continued management of Parkinson's disease diagnosed approximately 2014. Records suggest tremors of the hands at onset with later development of generalized slowness, stiffness, and gait changes. She was previously followed by [facility] neurology. Of note, Ms. Kaitlin Alaniz has been a challenging patient to manage, complicated by inconsistency with respect to medications and she is often not a very good historian when trying to describe what medications she is taking and if they are effective or not. She has been on and off several medications in past both for Parkinson's disease as well as for persistent neuropathic pain of her lower extremities. She reports taking the following:  Sinemet  mg 0.5-1 tablets every 3-4 hours. Droxidopa 100 mg tablets (reports she only takes it usually daily or as needed if her blood pressure is low)  Xanax 0.5 mg 2 times a day as needed     Previous medication trials include: Amantadine, Gocori, Nourianz, Sinemet CR have been noted in the past and to my knowledge she is not taking these medications. Patient presents today for follow-up. With respect to her Parkinson's disease there continues to be motor fluctuations throughout the day.   At times during the day she may experience more of a rhythmic shaking and at other times during the day she may experience other movements more consistent with levodopa induced dyskinesias. She continues to take Sinemet as noted above, 1 tablet every 3-4 hours depending on how she is feeling so the overall total daily dosage may very depending on her symptoms that day. It should be said that her consistency with medications has been questioned. She reports recently taking 2 tablets in the morning which seems to give her better boost.  Then she may take anywhere from 25 to 1.5 tablets throughout the day. Dyskinesias do not seem to be a very significant problem at the current time. In general, add-on therapy has been poorly tolerated and she is at great risk for adherence due to confusion with medications and so we have attempted to simplify her regimen is much as possible. Recent chemodenervation for focal dystonia affecting the lower extremities is only met with transient benefit. No adverse effects. She has dystonic-like cramping with curling of the toes and ankle inversion. Next injection will be next month. She has a home aide for several hours a day who assists with basic chores including cooking, cleaning, and personal hygiene. There has been some discussion of her moving back to [place] with her daughter in the future but it does not appear that anything is planned at the current time. Orthostatic hypotension has been a problem and has been complicated by extreme hypertension as well. Currently she has had more issues with high systolic blood pressures and so she has not been taking droxidopa currently. She is on hydralazine currently as needed for any high systolic blood pressures. There is obvious evidence of cognitive impairment consistent with a PD dementia. This does not seem to be causing any significant issues other than the fact that she can get very confused easily with her medications and I have tried to turn these over more to her home aides to avoid problems.   No neuropsychiatric disturbances such as hallucinations or any other behavioral agitation. She does complain of difficulty with sleep onset insomnia. She takes Xanax at night and we discussed slightly increasing the dose. She does also continue to have some issues with gastric pain which seems to be exacerbated with a number of medications she was on. She has continued follow-up with gastroenterology. Review of Systems:   Review of Systems   Constitutional:  Negative for fatigue. HENT:  Negative for congestion. Eyes:  Negative for visual disturbance. Respiratory:  Negative for cough. Cardiovascular:  Negative for chest pain. Gastrointestinal:  Positive for abdominal pain. Genitourinary:  Negative for dysuria. Musculoskeletal:  Positive for gait problem and myalgias. Skin:  Negative for rash. Allergic/Immunologic: Negative for immunocompromised state. Neurological:  Positive for dizziness, tremors and weakness. Psychiatric/Behavioral:  Positive for confusion and decreased concentration. Lab/Imaging Review:   I REVIEWED PERTINENT LABS, IMAGES, AND REPORTS WITH THE PATIENT PERSONALLY, DIRECTLY AND FULLY. THE MOST PERTINENT FINDINGS ARE NOTED BELOW:    EEG January 2021:  IMPRESSION:   Mildly slow background. CT Head January 2021:  FINDINGS:   Skull and scalp: No evidence of acute osseous abnormality. Mastoids are aerated. Imaged paranasal sinuses are aerated. No radiopaque foreign bodies or evidence of acute abnormality. No acute large vascular territory ischemic changes, acute hemorrhage, hydrocephalus, or mass effect. Mild diffuse age-related atrophy. Minimal periventricular white matter low-attenuation likely reflecting gliosis from chronic small vessel ischemic change. IMPRESSION:   No CT evidence of acute intracranial abnormality. CT Head March 2019:   FINDINGS:  The brain parenchyma appears normal in attenuation and morphology no mass infarct or hemorrhage is suggested.   The ventricular system appears to be normal in overall size and morphology. The intra and extra-axial CSF spaces appear normal; no subarachnoid subdural or intraventricular hemorrhage is seen. The major visualized intracranial arterial and venous structures identified demonstrate no obvious abnormality in attenuation and morphology. The adjacent facial and orbital soft tissues visualized appear unremarkable. The visualized calvarium and facial bones appear unremarkable. IMPRESSION:  NORMAL CRANIAL CT WITHOUT CONTRAST ADMINISTRATION. MRI Brain March 2016:   IMPRESSION:  1. MULTIPLE SMALL NONSPECIFIC T2-HYPERINTENSE FOCI IN THE PERIVENTRICULAR SUBCORTICAL WHITE MATTER OF BOTH CEREBRAL HEMISPHERES IN A RELATIVELY SYMMETRIC PATTERN WITHOUT MASS EFFECT OR RESTRICTED DIFFUSION. SUSPECT MULTIFOCAL ISCHEMIC SEQUELA. 2. NO EVIDENCE OF HYDROCEPHALUS. NO ATROPHY IN EXCESS OF WHAT IS EXPECTED FOR AGE. 3. NO EVIDENCE OF RECENT ISCHEMIC INSULT. Past Medical History:     Past medical history, surgical history, social history, family history, medications, and allergies were reviewed and updated as appropriate. PAST MEDICAL HISTORY:  Past Medical History:   Diagnosis Date    Abnormal gait     freq falls    Anxiety     Chronic abdominal pain 7/5/2016    Dementia due to Parkinson's disease without behavioral disturbance ([address] 7/5/2016    Depression     managed by meds    Diverticulitis     Gastritis     GERD (gastroesophageal reflux disease)     Heart murmur     followed by [facility] cardiology.  last echo10/2015    Hypercholesterolemia     Hypertension     does not require medication    Nausea & vomiting     Neurologic orthostatic hypotension ([address]     Neuropathic pain 7/5/2016    Nontoxic multinodular goiter 7/5/2016    Parkinson's disease ([address]     dx 2014- sometimes requires walker or wheelchair when tired    Peripheral neuropathy     Syncope     per pt-- due to drop in b/p    Urinary frequency 7/5/2016    Weight loss, unintentional 20 lbs over 2 yrs     PAST SURGICAL HISTORY:   Past Surgical History:   Procedure Laterality Date    APPENDECTOMY  1974    CHOLECYSTECTOMY  2001    COLONOSCOPY N/A 8/6/2018    COLONOSCOPY/ 23 performed by Maurice Ferreira MD at [facility] ENDOSCOPY    COLONOSCOPY N/A 4/16/2019    COLONOSCOPY/ 18 performed by Ree Phoenix., MD at [address] [place] [address] HISTORY:  Family History   Problem Relation Age of Onset    Stroke Father     Cancer Father         prostate    Diabetes Father     Alzheimer's Disease Mother     Cancer Brother         kidney and prostate    Sickle Cell Anemia Son         2 sons     Diabetes Brother     Diabetes Sister     Breast Cancer Neg Hx       SOCIAL HISTORY:  Social History     Socioeconomic History    Marital status:       Spouse name: None    Number of children: None    Years of education: None    Highest education level: None   Tobacco Use    Smoking status: Never    Smokeless tobacco: Never   Substance and Sexual Activity    Alcohol use: No    Drug use: No       Medications/Allergies:     MEDICATIONS:   Outpatient Encounter Medications as of 3/15/2023   Medication Sig Dispense Refill    vitamin B-12 (CYANOCOBALAMIN) 1000 MCG tablet TAKE 1 TABLET BY MOUTH EVERY DAY      diclofenac sodium (VOLTAREN) 1 % GEL Apply 1-2 g topically 3 times daily as needed      hydrALAZINE (APRESOLINE) 10 MG tablet Take 10 mg by mouth 2 times daily as needed      carbidopa-levodopa (SINEMET)  MG per tablet 1/2-1 tab q3hrs (7 times a day) 210 tablet 11    dicyclomine (BENTYL) 10 MG capsule Take 10 mg by mouth 4 times daily (before meals and nightly)      aluminum & magnesium hydroxide-simethicone (MYLANTA) 400-400-40 MG/5ML SUSP Take 15 mLs by mouth every 6 hours as needed      acetaminophen (TYLENOL) 500 MG tablet Take 500 mg by mouth every 8 hours as needed      docusate (COLACE, DULCOLAX) 100 MG CAPS Take 100 mg by mouth daily      esomeprazole (NEXIUM) 40 MG delayed release capsule Take 40 mg by mouth 2 times daily      famotidine (PEPCID) 20 MG tablet Take 40 mg by mouth 2 times daily      levothyroxine (SYNTHROID) 75 MCG tablet Take by mouth every morning (before breakfast)      loratadine (CLARITIN) 10 MG tablet Take 10 mg by mouth daily as needed      ondansetron (ZOFRAN) 8 MG tablet Take 8 mg by mouth every 8 hours as needed      ALPRAZolam (XANAX) 0.5 MG tablet TAKE 1 TABLET BY MOUTH 3 TIMES A DAY AS NEEDED FOR ANXIETY. mirtazapine (REMERON) 7.5 MG tablet TAKE 1 TABLET (7.5 MG) BY MOUTH NIGHTLY      sucralfate (CARAFATE) 1 GM/10ML suspension TAKE 10 MILLILITER BY MOUTH 4 TIMES EVERY DAY ON AN EMPTY STOMACH 1 HOUR BEFORE MEALS AND AT BEDTIME      LINZESS 72 MCG CAPS capsule PLEASE SEE ATTACHED FOR DETAILED DIRECTIONS      ondansetron (ZOFRAN-ODT) 4 MG disintegrating tablet PLACE 1 TABLET BY MOUTH ON TOP OF TONGUE THEN SWALLOW EVERY 8 HOURS AS NEEDED      gabapentin (NEURONTIN) 100 MG capsule TAKE 1 CAPSULE BY MOUTH EVERY EVENING (Patient not taking: No sig reported)      Droxidopa 100 MG CAPS Take 1 capsule by mouth 3 times daily (Patient not taking: No sig reported) 270 capsule 3    [DISCONTINUED] linaclotide (LINZESS) 145 MCG capsule Take by mouth every morning (before breakfast) (Patient not taking: No sig reported)      [DISCONTINUED] ondansetron (ZOFRAN-ODT) 8 MG TBDP disintegrating tablet Take 8 mg by mouth every 8 hours as needed       No facility-administered encounter medications on file as of 3/15/2023. ALLERGIES:  Allergies   Allergen Reactions    Metronidazole Other (See Comments)     Upset her stomach, nausea  Other reaction(s): Nausea    Gluten Meal Other (See Comments)     Pt is not allergic to gluten - gluten is the only thing she can eat - per pt  *Remove from allergy list per request of pre-assessment RN - 11/29/16    Iohexol Other (See Comments)     Stomach upset.     Wheat Bran Diarrhea    Aspirin Nausea And Vomiting     Other reaction(s): Nausea    Gabapentin Nausea Only     Other reaction(s): Nausea    Iodine Nausea And Vomiting    Lac Bovis      Other reaction(s): Nausea    Morphine Other (See Comments)     Other reaction(s): Nausea    Red Dye Other (See Comments)     Upset stomach. Other reaction(s): Nausea       Physical Exam:     /72   Pulse 56   Ht 5' 6\" (1.676 m)   Wt 111 lb (50.3 kg)   SpO2 98%   BMI 17.92 kg/m²     General Exam:  General - Well nourished, in a wheelchair, in no apparent distress. HEENT - Normocephalic, atraumatic. Sclera anicteric. Oropharynx clear. Neck - Supple without masses  Cardiovascular - Regular rate and rhythm. No carotid bruits. Lungs - Non-labored breathing. Abdomen - Soft, nontender, nondistended. Extremities - Peripheral pulses intact. No edema and no rashes. Neurological Exam:      MS/Language/Speech - Alert. Oriented x 3. Language fluent. Speech shows slight hypophonia. Not much stuttering today. Cranial Nerves - PERRL. Eye movements full with saccadic pursuits. No nystagmus. There is mildly reduced facial expression without asymmetry. Tongue and palate were midline. There is a shoulder elevation on the right as compared to the left. Motor - There are no dyskinetic movements noted of the lower extremities today but have been noted in the past.  There is mild slowing on the right and moderate slowing on the left. There is mild rigidity on the right and mild to moderate rigidity of the left in both upper and lower extremities. Only brief intermittent resting tremors of the lower extremities were noted. Sensory - Normal to light touch and vibration throughout. Cerebellar - No ataxia or dysmetria. Reflexes (R/L): Biceps (2+/2+), Triceps (2+/2+), Brachioradialis (2+/2+), Patellar (2+/2+), Ankle (0+/0+). Buckley's was negative and plantar responses were flexor. Gait - She had difficulty rising from her wheelchair and required assistance.   She was able to take a steps for me around the room. No freezing noted at the current time. Assessment and Plan:     Isaias James is a 67 y.o. female who presents with the following issues:     Kaela Nelson was seen today for follow-up. Diagnoses and all orders for this visit:    Parkinson's disease ([address]    Dystonia    Motor fluctuations related to medication use in Parkinson's disease ([address]    Abnormality of gait due to impairment of balance    Neurogenic orthostatic hypotension ([address]    Dementia due to Parkinson's disease without behavioral disturbance (HCC)    Levodopa-induced dyskinesia    Anxiety      Ms. Kera Whitt presents for follow-up and continued management of her Parkinson's disease which is complicated by several issues including gait and balance impairment, levodopa-induced dyskinesias, motor fluctuations related to use of levodopa, cognitive impairment likely consistent with Parkinson's disease dementia, and focal dystonia of the lower extremities. We will continue Botox injections today to the lower extremities for focal dystonic cramping. Discussed increasing the dose at the next injection. Next injection is due in April. She will continue Sinemet  mg approximately 1 tablet every 3 hours but did not improve her taking 2 tablets in the morning which may give her a little extra boost.  Continue to monitor for any worsening dyskinesias. Encouraged consistency with respect to timing and dose of medications at all possible. Recall that add-on therapy with amantadine and Gocovri has overall been poorly tolerated in addition to other formulations of levodopa such as extended release. Note that her care has been complicated by significant inconsistency with respect to medication usage and timing which is always made it very challenging for us to make changes. I have always encouraged her to bring all of her medications for review.      She may continue droxidopa prescribed is 100 mg 3 times a day though at the current time it does not appear that she is taking it. She seems to have more of a tendency recently for more high systolic blood pressures. She will continue hydralazine as needed for systolic blood pressures > 180 mmHg. She may continue Xanax 0.5 mg 3 times daily as needed for anxiety. Discussed trying 2 tablets at night for sleep onset insomnia. Continue to monitor for any worsening psychosis or psychiatric disturbances given what is already likely a history of PD dementia. Follow up is arranged. Signature: Wilfredo De La Paz MD      Date:  3/16/2023    [facility]   [address] , [address]  Ph: [phone]  Fax: [phone]         I have personally interviewed and examined Ms. Fede Wilkins and I have personally reviewed all relevant records including labs and imaging as noted above. I have written all aspects of this note. More than 50% of this time was used for counseling regarding my diagnosis, prognosis, and plans for management. Total visit time: 42 minutes.

## 2023-03-22 ENCOUNTER — TELEPHONE (OUTPATIENT)
Dept: NEUROLOGY | Age: 73
End: 2023-03-22

## 2023-03-22 NOTE — TELEPHONE ENCOUNTER
Patient called stating that she has not been sleeping for 5 or 6 months. She would like something that will help with that. She states that at her last OV, she did not receive her AVS.  She would like a copy mailed to her.

## 2023-04-20 ENCOUNTER — OFFICE VISIT (OUTPATIENT)
Dept: NEUROLOGY | Age: 73
End: 2023-04-20

## 2023-04-20 VITALS
DIASTOLIC BLOOD PRESSURE: 70 MMHG | BODY MASS INDEX: 17.84 KG/M2 | HEART RATE: 58 BPM | SYSTOLIC BLOOD PRESSURE: 104 MMHG | OXYGEN SATURATION: 95 % | HEIGHT: 66 IN | WEIGHT: 111 LBS

## 2023-04-20 DIAGNOSIS — F02.80 DEMENTIA DUE TO PARKINSON'S DISEASE WITHOUT BEHAVIORAL DISTURBANCE (HCC): ICD-10-CM

## 2023-04-20 DIAGNOSIS — G90.3 NEUROGENIC ORTHOSTATIC HYPOTENSION (HCC): ICD-10-CM

## 2023-04-20 DIAGNOSIS — G20 MOTOR FLUCTUATIONS RELATED TO MEDICATION USE IN PARKINSON'S DISEASE (HCC): ICD-10-CM

## 2023-04-20 DIAGNOSIS — T42.8X5A MOTOR FLUCTUATIONS RELATED TO MEDICATION USE IN PARKINSON'S DISEASE (HCC): ICD-10-CM

## 2023-04-20 DIAGNOSIS — G20 PARKINSON'S DISEASE (HCC): ICD-10-CM

## 2023-04-20 DIAGNOSIS — R26.89 ABNORMALITY OF GAIT DUE TO IMPAIRMENT OF BALANCE: ICD-10-CM

## 2023-04-20 DIAGNOSIS — G24.01 LEVODOPA-INDUCED DYSKINESIA: ICD-10-CM

## 2023-04-20 DIAGNOSIS — G20 DEMENTIA DUE TO PARKINSON'S DISEASE WITHOUT BEHAVIORAL DISTURBANCE (HCC): ICD-10-CM

## 2023-04-20 DIAGNOSIS — T42.8X5A LEVODOPA-INDUCED DYSKINESIA: ICD-10-CM

## 2023-04-20 DIAGNOSIS — F41.9 ANXIETY: ICD-10-CM

## 2023-04-20 DIAGNOSIS — G24.9 DYSTONIA: Primary | ICD-10-CM

## 2023-04-20 ASSESSMENT — ENCOUNTER SYMPTOMS
ABDOMINAL PAIN: 1
COUGH: 0

## 2023-04-20 NOTE — PROGRESS NOTES
Harini Galvan  [address] [place] [address] Dr, 410 [facility], [address] [place] [address]  Phone: [phone] Fax [phone]  Tracey Cano MD      Patient: Jamie Jordan  Provider: Tracey Cano MD    CC:   Chief Complaint   Patient presents with    Follow-up     Botox for dystonia       Referring Provider:    History of Present Illness:     Jamie Jordan is a 67 y.o. RH female who presents for follow up of Parkinson's disease and chemodenervation for focal dystonia in the lower extremities. She is accompanied by her daughter. She was last seen in March 2023. She presents for follow-up and continued management of Parkinson's disease diagnosed approximately 2014. Records suggest tremors of the hands at onset with later development of generalized slowness, stiffness, and gait changes. She was previously followed by [facility] neurology. Of note, Ms. Uriah Heard has been a challenging patient to manage, complicated by inconsistency with respect to medications and she is often not a very good historian when trying to describe what medications she is taking and if they are effective or not. She has been on and off several medications in past both for Parkinson's disease as well as for persistent neuropathic pain of her lower extremities. She reports taking the following:  Sinemet  mg 0.5-1 tablets every 3-4 hours. Droxidopa 100 mg tablets (reports she only takes it usually daily or as needed if her blood pressure is low)  Xanax 0.5 mg 2 times a day as needed     Previous medication trials include: Amantadine, Gocori, Nourianz, Sinemet CR have been noted in the past and to my knowledge she is not taking these medications. Patient presents today for follow-up and chemodenervation for focal dystonia affecting the lower extremities. This will be her second injection. The first injection was only met with transient benefit over a few days. No adverse effects.   She has dystonic-like cramping and

## 2023-06-05 ENCOUNTER — TELEPHONE (OUTPATIENT)
Dept: NEUROLOGY | Age: 73
End: 2023-06-05

## 2023-06-05 NOTE — TELEPHONE ENCOUNTER
Spoke with patient after she had left a VM. She has fallen twice in the last 3 weeks, last one was Friday. I asked if she had hurt herself from the most recent fall. She stated that the back of her head was a little sore. I asked her if she had hit her head and she could not recall if she had. She is asking for a prescription to help with the numbness and pain she is having in her legs.  [phone]

## 2023-06-05 NOTE — TELEPHONE ENCOUNTER
Discussed with patient. Reviewed importance of blood pressure monitoring, adequate hydration, and lower doses of levodopa to avoid orthostasis and dyskinesias. Has other nonspecific complaints. Suggested to follow-up with PCP or with urgent care. She will keep us posted.

## 2023-07-10 ENCOUNTER — TELEPHONE (OUTPATIENT)
Dept: NEUROLOGY | Age: 73
End: 2023-07-10

## 2023-07-10 DIAGNOSIS — G20 PARKINSON'S DISEASE (HCC): Primary | ICD-10-CM

## 2023-07-10 DIAGNOSIS — Z74.09 IMPAIRED FUNCTIONAL MOBILITY, BALANCE, GAIT, AND ENDURANCE: ICD-10-CM

## 2023-07-10 NOTE — TELEPHONE ENCOUNTER
Patient is calling to request a hospital bed. It is getting very difficult for her to get in and out of regular bed.

## 2023-07-13 ENCOUNTER — OFFICE VISIT (OUTPATIENT)
Dept: NEUROLOGY | Age: 73
End: 2023-07-13
Payer: MEDICARE

## 2023-07-13 VITALS — HEART RATE: 82 BPM | OXYGEN SATURATION: 96 % | SYSTOLIC BLOOD PRESSURE: 160 MMHG | DIASTOLIC BLOOD PRESSURE: 72 MMHG

## 2023-07-13 DIAGNOSIS — G20 DEMENTIA DUE TO PARKINSON'S DISEASE WITHOUT BEHAVIORAL DISTURBANCE (HCC): ICD-10-CM

## 2023-07-13 DIAGNOSIS — F41.9 ANXIETY: ICD-10-CM

## 2023-07-13 DIAGNOSIS — T42.8X5A MOTOR FLUCTUATIONS RELATED TO MEDICATION USE IN PARKINSON'S DISEASE (HCC): ICD-10-CM

## 2023-07-13 DIAGNOSIS — G20 PARKINSON'S DISEASE (HCC): Primary | ICD-10-CM

## 2023-07-13 DIAGNOSIS — F02.80 DEMENTIA DUE TO PARKINSON'S DISEASE WITHOUT BEHAVIORAL DISTURBANCE (HCC): ICD-10-CM

## 2023-07-13 DIAGNOSIS — G20 MOTOR FLUCTUATIONS RELATED TO MEDICATION USE IN PARKINSON'S DISEASE (HCC): ICD-10-CM

## 2023-07-13 DIAGNOSIS — G24.9 DYSTONIA: ICD-10-CM

## 2023-07-13 DIAGNOSIS — M79.2 NEUROPATHIC PAIN: ICD-10-CM

## 2023-07-13 DIAGNOSIS — G24.01 LEVODOPA-INDUCED DYSKINESIA: ICD-10-CM

## 2023-07-13 DIAGNOSIS — Z74.09 IMPAIRED FUNCTIONAL MOBILITY, BALANCE, GAIT, AND ENDURANCE: ICD-10-CM

## 2023-07-13 DIAGNOSIS — T42.8X5A LEVODOPA-INDUCED DYSKINESIA: ICD-10-CM

## 2023-07-13 DIAGNOSIS — G90.3 NEUROGENIC ORTHOSTATIC HYPOTENSION (HCC): ICD-10-CM

## 2023-07-13 PROCEDURE — G8400 PT W/DXA NO RESULTS DOC: HCPCS | Performed by: PSYCHIATRY & NEUROLOGY

## 2023-07-13 PROCEDURE — 1036F TOBACCO NON-USER: CPT | Performed by: PSYCHIATRY & NEUROLOGY

## 2023-07-13 PROCEDURE — G8428 CUR MEDS NOT DOCUMENT: HCPCS | Performed by: PSYCHIATRY & NEUROLOGY

## 2023-07-13 PROCEDURE — 99215 OFFICE O/P EST HI 40 MIN: CPT | Performed by: PSYCHIATRY & NEUROLOGY

## 2023-07-13 PROCEDURE — 3074F SYST BP LT 130 MM HG: CPT | Performed by: PSYCHIATRY & NEUROLOGY

## 2023-07-13 PROCEDURE — 1123F ACP DISCUSS/DSCN MKR DOCD: CPT | Performed by: PSYCHIATRY & NEUROLOGY

## 2023-07-13 PROCEDURE — G8419 CALC BMI OUT NRM PARAM NOF/U: HCPCS | Performed by: PSYCHIATRY & NEUROLOGY

## 2023-07-13 PROCEDURE — 3078F DIAST BP <80 MM HG: CPT | Performed by: PSYCHIATRY & NEUROLOGY

## 2023-07-13 PROCEDURE — 3017F COLORECTAL CA SCREEN DOC REV: CPT | Performed by: PSYCHIATRY & NEUROLOGY

## 2023-07-13 PROCEDURE — 1090F PRES/ABSN URINE INCON ASSESS: CPT | Performed by: PSYCHIATRY & NEUROLOGY

## 2023-07-13 RX ORDER — TRAMADOL HYDROCHLORIDE 50 MG/1
50 TABLET ORAL EVERY 8 HOURS PRN
Qty: 15 TABLET | Refills: 0 | Status: SHIPPED | OUTPATIENT
Start: 2023-07-13 | End: 2023-07-18

## 2023-07-13 ASSESSMENT — ENCOUNTER SYMPTOMS
ABDOMINAL PAIN: 1
COUGH: 0

## 2023-07-13 NOTE — PROGRESS NOTES
[facility]  [address] [facility], [address] [facility] [address], [address]  Phone: [phone] Fax [phone]  Anisa Cardoza MD      Patient: Natali Colón  Provider: Anisa Cardoza MD    CC:   Chief Complaint   Patient presents with    Follow-up     Discuss severe leg pain     Referring Provider:    History of Present Illness:     Natali Colón is a 68 y.o. RH female who presents for follow up of Parkinson's disease and chemodenervation for focal dystonia in the lower extremities. She is accompanied by her daughter. She was last seen April 2023. She presents for follow-up and continued management of Parkinson's disease diagnosed approximately 2014. Records suggest tremors of the hands at onset with later development of generalized slowness, stiffness, and gait changes. She was previously followed by [facility] neurology. Of note, Ms. Rebecca Mueller has been a challenging patient to manage, complicated by inconsistency with respect to medications and she is often not a very good historian when trying to describe what medications she is taking and if they are effective or not. She has been on and off several medications in past both for Parkinson's disease as well as for persistent neuropathic pain of her lower extremities. She reports taking the following:  Sinemet  mg 0.5-1 tablets every 3-4 hours. Droxidopa 100 mg tablets (reports she only takes it usually daily or as needed if her blood pressure is low)  Xanax 0.5 mg 2 times a day as needed     Previous medication trials include: Amantadine, Gocori, Nourianz, Sinemet CR have been noted in the past and to my knowledge she is not taking these medications. Patient presents today for follow-up and chemodenervation for focal dystonia affecting the lower extremities. She has had two prior injections, neither of which resulted in any benefit.   She has persistent neuropathic discomfort in the lower extremities which can sometimes be

## 2023-07-27 ENCOUNTER — TELEPHONE (OUTPATIENT)
Dept: NEUROLOGY | Age: 73
End: 2023-07-27

## 2023-07-27 DIAGNOSIS — M62.838 MUSCLE SPASM: Primary | ICD-10-CM

## 2023-07-27 NOTE — TELEPHONE ENCOUNTER
Patient indicates that the tramadol 50mg tablet is not working for pain and would like to request a new medication as soon as possible.

## 2023-07-28 DIAGNOSIS — G90.3 NEUROGENIC ORTHOSTATIC HYPOTENSION (HCC): ICD-10-CM

## 2023-07-28 RX ORDER — BACLOFEN 5 MG/1
5 TABLET ORAL 3 TIMES DAILY PRN
Qty: 90 TABLET | Refills: 2 | Status: SHIPPED | OUTPATIENT
Start: 2023-07-28

## 2023-07-28 NOTE — TELEPHONE ENCOUNTER
Advised to stop tramadol. Trial of baclofen 5 mg 3 times a day as needed. Counseled on potential adverse effects including sedation.

## 2023-07-29 RX ORDER — DROXIDOPA 100 MG/1
100 CAPSULE ORAL 3 TIMES DAILY
Qty: 270 CAPSULE | Refills: 3 | Status: SHIPPED | OUTPATIENT
Start: 2023-07-29

## 2023-08-11 NOTE — LETTER
Informed Consent for Botulinumtoxina  (Botox)                                                                  MDE#[id number]    Patient Name: Iveth Correia             :_1950    I give consent for the following procedure(s)  Botulinumtoxina (Botox) injections to be performed by:    [x]   Dr. Luis Lee    []   Fabrizio Lara NP    []   Dr. Dayanara Hansen    []   Dr. Shahnaz Mondragon    I understand that my provider will have the main responsibility for my care specific to procedure. I understand the the doctor may be fellows, residents or other qualified first assistant's may be involved in my procedure under the supervision of the above provider. My provider has explained to me my condition, the nature and purpose of each procedure, and the risks and types of complications involved, the potential benefits and drawbacks, potential problems related to recovery, the likelihood of success, the possible results of non-treatment,and the reasonable options or alternatives. I have had the chance to ask questions about the planned procedure and all my questions have been answered to my satisfaction. I have received no promises from anyone about the results that may be obtained from the procedure. All procedures involve risk. Routine risks include, but are not limited to, perforation and/or injury to nearby blood vessels, nerve and/or organs, infection, blood clots. Other risks may include paralysis and/or _____________________________________. I understand that the information I have received about the risks may not be fully completed and other less common risks.     ________________________________________Date:_______________  Patient Signature    ________________________________________Date:_______________  Provider Signature                         _______________________________________ Date:_______________             Witness Signature no

## 2023-10-13 ENCOUNTER — OFFICE VISIT (OUTPATIENT)
Dept: NEUROLOGY | Age: 73
End: 2023-10-13
Payer: MEDICARE

## 2023-10-13 VITALS
WEIGHT: 117 LBS | SYSTOLIC BLOOD PRESSURE: 92 MMHG | BODY MASS INDEX: 18.88 KG/M2 | DIASTOLIC BLOOD PRESSURE: 64 MMHG | OXYGEN SATURATION: 96 % | HEART RATE: 66 BPM

## 2023-10-13 DIAGNOSIS — G90.3 NEUROGENIC ORTHOSTATIC HYPOTENSION (HCC): ICD-10-CM

## 2023-10-13 DIAGNOSIS — Z74.09 IMPAIRED FUNCTIONAL MOBILITY, BALANCE, GAIT, AND ENDURANCE: ICD-10-CM

## 2023-10-13 DIAGNOSIS — F41.9 ANXIETY: ICD-10-CM

## 2023-10-13 DIAGNOSIS — G20.A1 DEMENTIA DUE TO PARKINSON'S DISEASE WITHOUT BEHAVIORAL DISTURBANCE (HCC): ICD-10-CM

## 2023-10-13 DIAGNOSIS — F02.80 DEMENTIA DUE TO PARKINSON'S DISEASE WITHOUT BEHAVIORAL DISTURBANCE (HCC): ICD-10-CM

## 2023-10-13 DIAGNOSIS — G20.B2 PARKINSON'S DISEASE WITH DYSKINESIA AND FLUCTUATING MANIFESTATIONS: Primary | ICD-10-CM

## 2023-10-13 DIAGNOSIS — G24.9 DYSTONIA: ICD-10-CM

## 2023-10-13 PROCEDURE — 1123F ACP DISCUSS/DSCN MKR DOCD: CPT | Performed by: PSYCHIATRY & NEUROLOGY

## 2023-10-13 PROCEDURE — 3074F SYST BP LT 130 MM HG: CPT | Performed by: PSYCHIATRY & NEUROLOGY

## 2023-10-13 PROCEDURE — G8420 CALC BMI NORM PARAMETERS: HCPCS | Performed by: PSYCHIATRY & NEUROLOGY

## 2023-10-13 PROCEDURE — G8484 FLU IMMUNIZE NO ADMIN: HCPCS | Performed by: PSYCHIATRY & NEUROLOGY

## 2023-10-13 PROCEDURE — G8400 PT W/DXA NO RESULTS DOC: HCPCS | Performed by: PSYCHIATRY & NEUROLOGY

## 2023-10-13 PROCEDURE — 1036F TOBACCO NON-USER: CPT | Performed by: PSYCHIATRY & NEUROLOGY

## 2023-10-13 PROCEDURE — G8428 CUR MEDS NOT DOCUMENT: HCPCS | Performed by: PSYCHIATRY & NEUROLOGY

## 2023-10-13 PROCEDURE — 99214 OFFICE O/P EST MOD 30 MIN: CPT | Performed by: PSYCHIATRY & NEUROLOGY

## 2023-10-13 PROCEDURE — 1090F PRES/ABSN URINE INCON ASSESS: CPT | Performed by: PSYCHIATRY & NEUROLOGY

## 2023-10-13 PROCEDURE — 3078F DIAST BP <80 MM HG: CPT | Performed by: PSYCHIATRY & NEUROLOGY

## 2023-10-13 PROCEDURE — 3017F COLORECTAL CA SCREEN DOC REV: CPT | Performed by: PSYCHIATRY & NEUROLOGY

## 2023-10-13 ASSESSMENT — PATIENT HEALTH QUESTIONNAIRE - PHQ9
SUM OF ALL RESPONSES TO PHQ QUESTIONS 1-9: 0
SUM OF ALL RESPONSES TO PHQ QUESTIONS 1-9: 0
2. FEELING DOWN, DEPRESSED OR HOPELESS: 0
SUM OF ALL RESPONSES TO PHQ QUESTIONS 1-9: 0
SUM OF ALL RESPONSES TO PHQ QUESTIONS 1-9: 0
1. LITTLE INTEREST OR PLEASURE IN DOING THINGS: 0
SUM OF ALL RESPONSES TO PHQ9 QUESTIONS 1 & 2: 0

## 2023-10-13 ASSESSMENT — ENCOUNTER SYMPTOMS
ABDOMINAL PAIN: 1
COUGH: 0

## 2023-10-13 NOTE — PROGRESS NOTES
[phone]         I have personally interviewed and examined Ms. Amber Shay and I have personally reviewed all relevant records including labs and imaging as noted above. I have written all aspects of this note. More than 50% of this time was used for counseling regarding my diagnosis, prognosis, and plans for management. Total visit time: 35 minutes.

## 2023-10-17 ENCOUNTER — TELEPHONE (OUTPATIENT)
Dept: NEUROLOGY | Age: 73
End: 2023-10-17

## 2023-10-17 DIAGNOSIS — Z74.09 IMPAIRED FUNCTIONAL MOBILITY, BALANCE, GAIT, AND ENDURANCE: ICD-10-CM

## 2023-10-17 DIAGNOSIS — G20.B2 PARKINSON'S DISEASE WITH DYSKINESIA AND FLUCTUATING MANIFESTATIONS: Primary | ICD-10-CM

## 2023-10-23 ENCOUNTER — TELEPHONE (OUTPATIENT)
Dept: NEUROLOGY | Age: 73
End: 2023-10-23

## 2023-10-23 DIAGNOSIS — G20.B2 PARKINSON'S DISEASE WITH DYSKINESIA AND FLUCTUATING MANIFESTATIONS: Primary | ICD-10-CM

## 2023-10-23 NOTE — TELEPHONE ENCOUNTER
Pt called and stated that her entire body hurts and that she can not walk. Pt stated that her body was burning and she thinks its the medication. Pt asking for a call back to find out what she needs to do next.     Thanks

## 2023-10-26 RX ORDER — ROTIGOTINE 2 MG/24H
1 PATCH, EXTENDED RELEASE TRANSDERMAL DAILY
Qty: 30 PATCH | Refills: 5 | Status: SHIPPED | OUTPATIENT
Start: 2023-10-26

## 2023-10-26 NOTE — TELEPHONE ENCOUNTER
Spoke to patient. It appears that the dose of 1 tablet every 3 hours may be met with some nonspecific adverse effects which are difficult to describe. So I am going to have her reduce it back to 0.5 tablets every 3 hours. Since we are not able to get a good balance with the levodopa alone we again discussed other add-on therapy. Patient requested a trial of Neupro patch. Start Neupro 2 mg / 24 hours. Discussed potential adverse effects and proper use of the medication.

## 2023-12-14 ENCOUNTER — TELEPHONE (OUTPATIENT)
Dept: NEUROLOGY | Age: 73
End: 2023-12-14

## 2023-12-14 NOTE — TELEPHONE ENCOUNTER
Spoke with Carmen Herrera today. She wanted to clarify directions for Droxidopa. I let her know that it was prescribed 1 tablet 3 times daily. She had been taking it only when her BP was low. She is going to try and take it as directed to see if it will help stabilize her BP a little better. She will call back to give us an update.

## 2023-12-27 ENCOUNTER — TELEPHONE (OUTPATIENT)
Age: 73
End: 2023-12-27

## 2023-12-27 ENCOUNTER — OFFICE VISIT (OUTPATIENT)
Age: 73
End: 2023-12-27
Payer: MEDICARE

## 2023-12-27 VITALS
DIASTOLIC BLOOD PRESSURE: 58 MMHG | SYSTOLIC BLOOD PRESSURE: 96 MMHG | WEIGHT: 115 LBS | BODY MASS INDEX: 18.56 KG/M2 | HEART RATE: 55 BPM

## 2023-12-27 DIAGNOSIS — R09.89 LABILE HYPERTENSION: Primary | ICD-10-CM

## 2023-12-27 DIAGNOSIS — I95.1 ORTHOSTATIC HYPOTENSION: ICD-10-CM

## 2023-12-27 DIAGNOSIS — R00.2 PALPITATIONS: ICD-10-CM

## 2023-12-27 PROCEDURE — 1036F TOBACCO NON-USER: CPT | Performed by: INTERNAL MEDICINE

## 2023-12-27 PROCEDURE — G8484 FLU IMMUNIZE NO ADMIN: HCPCS | Performed by: INTERNAL MEDICINE

## 2023-12-27 PROCEDURE — 3078F DIAST BP <80 MM HG: CPT | Performed by: INTERNAL MEDICINE

## 2023-12-27 PROCEDURE — 99205 OFFICE O/P NEW HI 60 MIN: CPT | Performed by: INTERNAL MEDICINE

## 2023-12-27 PROCEDURE — 3017F COLORECTAL CA SCREEN DOC REV: CPT | Performed by: INTERNAL MEDICINE

## 2023-12-27 PROCEDURE — G8420 CALC BMI NORM PARAMETERS: HCPCS | Performed by: INTERNAL MEDICINE

## 2023-12-27 PROCEDURE — 1090F PRES/ABSN URINE INCON ASSESS: CPT | Performed by: INTERNAL MEDICINE

## 2023-12-27 PROCEDURE — G8427 DOCREV CUR MEDS BY ELIG CLIN: HCPCS | Performed by: INTERNAL MEDICINE

## 2023-12-27 PROCEDURE — 3074F SYST BP LT 130 MM HG: CPT | Performed by: INTERNAL MEDICINE

## 2023-12-27 PROCEDURE — G8400 PT W/DXA NO RESULTS DOC: HCPCS | Performed by: INTERNAL MEDICINE

## 2023-12-27 PROCEDURE — 93000 ELECTROCARDIOGRAM COMPLETE: CPT | Performed by: INTERNAL MEDICINE

## 2023-12-27 PROCEDURE — 1123F ACP DISCUSS/DSCN MKR DOCD: CPT | Performed by: INTERNAL MEDICINE

## 2023-12-27 RX ORDER — CALCIUM CARBONATE/VITAMIN D3 500 MG-10
1 TABLET ORAL
COMMUNITY
End: 2023-12-27

## 2023-12-27 RX ORDER — AMLODIPINE BESYLATE 2.5 MG/1
2.5 TABLET ORAL DAILY
COMMUNITY
Start: 2023-08-16 | End: 2023-12-27 | Stop reason: SDUPTHER

## 2023-12-27 RX ORDER — MIDODRINE HYDROCHLORIDE 2.5 MG/1
2.5 TABLET ORAL 3 TIMES DAILY
COMMUNITY
End: 2023-12-27

## 2023-12-27 RX ORDER — LIDOCAINE HYDROCHLORIDE 20 MG/ML
15 SOLUTION OROPHARYNGEAL DAILY
COMMUNITY
Start: 2023-12-22

## 2023-12-27 RX ORDER — AMLODIPINE BESYLATE 2.5 MG/1
2.5 TABLET ORAL DAILY PRN
COMMUNITY
Start: 2023-12-13 | End: 2023-12-27

## 2023-12-27 RX ORDER — ROPINIROLE 0.25 MG/1
0.25 TABLET, FILM COATED ORAL NIGHTLY
COMMUNITY

## 2023-12-27 RX ORDER — CYCLOBENZAPRINE HCL 5 MG
5 TABLET ORAL 2 TIMES DAILY PRN
COMMUNITY
Start: 2023-07-11 | End: 2023-12-27

## 2023-12-27 RX ORDER — LACTULOSE 10 G/15ML
10 SOLUTION ORAL DAILY
COMMUNITY
Start: 2023-11-18

## 2023-12-27 RX ORDER — PANTOPRAZOLE SODIUM 40 MG/1
90 TABLET, DELAYED RELEASE ORAL DAILY
COMMUNITY
Start: 2023-08-24

## 2023-12-27 NOTE — PATIENT INSTRUCTIONS
For now, do not take amlodipine  Take droxidopa 3 times a day. Don't lie down for at least 2-3 hours after taking it.    For now, do not check your blood pressure  Return in 2-3 weeks bringing your home BP cuff and all your medications  For severe symptoms in the interim, proceed to the ER

## 2023-12-27 NOTE — TELEPHONE ENCOUNTER
I spoke w/pt. she said that she saw Deedee King today and she was told not to take Amlodipine PRN and do not check BP. She said that when she got home her head started hurting and her BP was 180/89 and she was shaking. Headache better now and current BP 89/61. She just wanted  aware of the incident today.

## 2023-12-27 NOTE — TELEPHONE ENCOUNTER
Dr Breen Been told her not to take blood pressure for 2 weeks But she has a severe headache and she took it and its 180/89 she is shaking all over Please call

## 2023-12-28 NOTE — TELEPHONE ENCOUNTER
Arabella Green MD  You1 hour ago (7:24 AM)     BM  And this highlights precisely why I made the recommendations that I did. Had she taken the amlodipine in response to that high reading, her BP may well have dropped even lower and caused syncope and injury. High BP can cause headache, but headache can also cause high BP so it makes sense it will be elevated if checking it while in any kind of pain. Take tylenol for headaches as needed and stay the course. Well done.

## 2024-01-05 ENCOUNTER — TELEPHONE (OUTPATIENT)
Age: 74
End: 2024-01-05

## 2024-01-05 NOTE — TELEPHONE ENCOUNTER
The patient reports her BP is up and down and sometimes it makes her feel like she is going to pass out and sometimes she feels dizzy with a headache. It has gotten worse over the last 2 weeks. She has questions about her medication and if it could be affecting her BP? She is currently wearing a heart monitor.

## 2024-01-05 NOTE — TELEPHONE ENCOUNTER
Spoke to pt, explained that orthostatic hypotension is an unfortunate comorbidity of her parkinson's disease. Again explained to pt that Dr. Hill has instructed her to stop checking her BP because it will continue to give labile readings as a result of her parkinson's disease. Explained we cannot treat the highs for fear of causing syncope. Instructed to keep appt on 1/11 for BP check and to bring her home BP cuff with her. Concerned that her droxidopa may not be helping her BP as well as should. Instructed to call her neurologist if she has concerns about her parkinson's medications. Verb understanding.

## 2024-01-11 ENCOUNTER — TELEPHONE (OUTPATIENT)
Age: 74
End: 2024-01-11

## 2024-01-11 NOTE — TELEPHONE ENCOUNTER
Talked to pt about removing monitor due to skin irritation.  told her that data we have will be fine. Follow up with at 1/24/2024.

## 2024-01-11 NOTE — TELEPHONE ENCOUNTER
Patient called and said she took her monitor off sooner than she was told due to itching and skin irritation. Please call to advise.

## 2024-01-16 ENCOUNTER — OFFICE VISIT (OUTPATIENT)
Dept: NEUROLOGY | Age: 74
End: 2024-01-16
Payer: MEDICARE

## 2024-01-16 VITALS
DIASTOLIC BLOOD PRESSURE: 71 MMHG | SYSTOLIC BLOOD PRESSURE: 121 MMHG | HEIGHT: 66 IN | BODY MASS INDEX: 18.56 KG/M2 | HEART RATE: 107 BPM

## 2024-01-16 DIAGNOSIS — G90.3 NEUROGENIC ORTHOSTATIC HYPOTENSION (HCC): ICD-10-CM

## 2024-01-16 DIAGNOSIS — G20.A1 DEMENTIA DUE TO PARKINSON'S DISEASE WITHOUT BEHAVIORAL DISTURBANCE (HCC): ICD-10-CM

## 2024-01-16 DIAGNOSIS — F41.9 ANXIETY: ICD-10-CM

## 2024-01-16 DIAGNOSIS — R26.9 GAIT DISTURBANCE: ICD-10-CM

## 2024-01-16 DIAGNOSIS — G20.B2 PARKINSON'S DISEASE WITH DYSKINESIA AND FLUCTUATING MANIFESTATIONS: Primary | ICD-10-CM

## 2024-01-16 DIAGNOSIS — F02.80 DEMENTIA DUE TO PARKINSON'S DISEASE WITHOUT BEHAVIORAL DISTURBANCE (HCC): ICD-10-CM

## 2024-01-16 PROCEDURE — 99215 OFFICE O/P EST HI 40 MIN: CPT | Performed by: PSYCHIATRY & NEUROLOGY

## 2024-01-16 PROCEDURE — 3078F DIAST BP <80 MM HG: CPT | Performed by: PSYCHIATRY & NEUROLOGY

## 2024-01-16 PROCEDURE — 3074F SYST BP LT 130 MM HG: CPT | Performed by: PSYCHIATRY & NEUROLOGY

## 2024-01-16 PROCEDURE — 1123F ACP DISCUSS/DSCN MKR DOCD: CPT | Performed by: PSYCHIATRY & NEUROLOGY

## 2024-01-16 ASSESSMENT — ENCOUNTER SYMPTOMS
COUGH: 0
ABDOMINAL PAIN: 1

## 2024-01-16 NOTE — PATIENT INSTRUCTIONS
Take carbidopa levodopa  mg tablets.  In  Continue to take every 3 hours.  Alternate between 1 tablet and 0.5 tablets with each dose.    Start the Neupro patch.  You apply 1 patch every 24 hours around the same time every day.  This helps with the Parkinson's symptoms and works well with the carbidopa levodopa.    Take droxidopa 100 mg tablets.  Take 1 tablet in the morning, 1 tablet in the afternoon, and 1 tablet in the early evening around dinnertime.  This will help keep the blood pressure from getting too low.    Continue to use the amlodipine as needed for any high blood pressures.

## 2024-01-16 NOTE — PROGRESS NOTES
[facility] NEUROLOGY  [address], [address]  [place] [address]  Phone: [phone] Fax [phone]  Fernando Yan MD      Patient: Sybil Rosado  Provider: Fernando Yan MD    CC:   Chief Complaint   Patient presents with    Follow-up     Parkinson's disease     Referring Provider:    History of Present Illness:     Sybil Rosado is a 73 y.o. RH female who presents for follow up of Parkinson's disease and chemodenervation for focal dystonia in the lower extremities.    She is accompanied by her daughter.  She was last seen October 2023.     She presents for follow-up and continued management of Parkinson's disease diagnosed approximately 2014. Records suggest tremors of the hands at onset with later development of generalized slowness, stiffness, and gait changes.  She was previously followed by [facility] neurology.     Of note, Ms. Rosado has been a challenging patient to manage, complicated by inconsistency with respect to medications and she is often not a very good historian when trying to describe what medications she is taking and if they are effective or not.  She has been on and off several medications in past both for Parkinson's disease as well as for persistent neuropathic pain of her lower extremities.     She reports taking the following:  Sinemet  mg 0.5-1 tablets every 3-4 hours.  Droxidopa 100 mg tablets (reports she only takes it usually daily or as needed if her blood pressure is low)  Xanax 0.5 mg 2 times a day as needed     Previous medication trials include: Amantadine, Gocori, Nourianz, Sinemet CR    Patient presents today for follow-up.    She does continue to have a description of motor fluctuations which can involve either patterns of \"shaking\" throughout the day in addition to a gait disturbance highlighted by shuffling and freezing of gait.  No obvious descriptions of dyskinesias at this time.  We have largely tried to keep her on a fairly conservative

## 2024-01-17 DIAGNOSIS — F41.9 ANXIETY: Primary | ICD-10-CM

## 2024-01-17 DIAGNOSIS — R11.0 NAUSEA: ICD-10-CM

## 2024-01-19 RX ORDER — ALPRAZOLAM 0.5 MG/1
TABLET ORAL
Qty: 90 TABLET | Refills: 2 | Status: SHIPPED | OUTPATIENT
Start: 2024-01-19 | End: 2024-06-17

## 2024-01-19 RX ORDER — ONDANSETRON 4 MG/1
TABLET, ORALLY DISINTEGRATING ORAL
Qty: 30 TABLET | Refills: 2 | Status: SHIPPED | OUTPATIENT
Start: 2024-01-19

## 2024-01-25 ENCOUNTER — TELEPHONE (OUTPATIENT)
Dept: NEUROLOGY | Age: 74
End: 2024-01-25

## 2024-01-25 DIAGNOSIS — G20.B2 PARKINSON'S DISEASE WITH DYSKINESIA AND FLUCTUATING MANIFESTATIONS: Primary | ICD-10-CM

## 2024-01-25 DIAGNOSIS — R26.9 GAIT DISTURBANCE: ICD-10-CM

## 2024-01-25 DIAGNOSIS — R29.6 FREQUENT FALLS: ICD-10-CM

## 2024-01-25 NOTE — TELEPHONE ENCOUNTER
Patient has fallen twice since last appointment.  She did end up in the ED, no brain bleed. She is wanting to know if we can get her an order for a helmet?

## 2024-01-26 ENCOUNTER — TELEPHONE (OUTPATIENT)
Dept: NEUROLOGY | Age: 74
End: 2024-01-26

## 2024-01-26 DIAGNOSIS — R52 PAIN: Primary | ICD-10-CM

## 2024-01-26 RX ORDER — TRAMADOL HYDROCHLORIDE 50 MG/1
50 TABLET ORAL EVERY 8 HOURS PRN
Qty: 30 TABLET | Refills: 0 | Status: SHIPPED | OUTPATIENT
Start: 2024-01-26 | End: 2024-02-05

## 2024-01-26 NOTE — TELEPHONE ENCOUNTER
Patient was given #10 tramadol in the ED when she fell. She is wanting to know if you would call in an rx for her. She says she is still pretty bruised up and hurting. She also wanted you to know that she only takes 1/2 tablet at a time.

## 2024-01-26 NOTE — TELEPHONE ENCOUNTER
I called in some extra.  I would encourage her to continue using the lowest dose needed for management of pain.

## 2024-02-12 DIAGNOSIS — R52 PAIN: Primary | ICD-10-CM

## 2024-02-13 ENCOUNTER — HOSPITAL ENCOUNTER (EMERGENCY)
Age: 74
Discharge: HOME | End: 2024-02-14
Attending: EMERGENCY MEDICINE
Payer: MEDICARE

## 2024-02-13 ENCOUNTER — APPOINTMENT (OUTPATIENT)
Dept: GENERAL RADIOLOGY | Age: 74
End: 2024-02-13
Payer: MEDICARE

## 2024-02-13 ENCOUNTER — CLINICAL DOCUMENTATION (OUTPATIENT)
Dept: NEUROLOGY | Age: 74
End: 2024-02-13

## 2024-02-13 DIAGNOSIS — B34.9 VIRAL SYNDROME: Primary | ICD-10-CM

## 2024-02-13 LAB
ALBUMIN SERPL-MCNC: 3.9 G/DL (ref 3.2–4.6)
ALBUMIN/GLOB SERPL: 1.3 (ref 0.4–1.6)
ALP SERPL-CCNC: 94 U/L (ref 50–136)
ALT SERPL-CCNC: 7 U/L (ref 12–65)
ANION GAP SERPL CALC-SCNC: 4 MMOL/L (ref 2–11)
APPEARANCE UR: CLEAR
AST SERPL-CCNC: 10 U/L (ref 15–37)
BACTERIA URNS QL MICRO: NEGATIVE /HPF
BASOPHILS # BLD: 0.1 K/UL (ref 0–0.2)
BASOPHILS NFR BLD: 1 % (ref 0–2)
BILIRUB SERPL-MCNC: 0.6 MG/DL (ref 0.2–1.1)
BILIRUB UR QL: NEGATIVE
BUN SERPL-MCNC: 17 MG/DL (ref 8–23)
CALCIUM SERPL-MCNC: 9.5 MG/DL (ref 8.3–10.4)
CASTS URNS QL MICRO: ABNORMAL /LPF
CHLORIDE SERPL-SCNC: 105 MMOL/L (ref 103–113)
CO2 SERPL-SCNC: 31 MMOL/L (ref 21–32)
COLOR UR: ABNORMAL
CREAT SERPL-MCNC: 1.4 MG/DL (ref 0.6–1)
DIFFERENTIAL METHOD BLD: ABNORMAL
EOSINOPHIL # BLD: 0 K/UL (ref 0–0.8)
EOSINOPHIL NFR BLD: 0 % (ref 0.5–7.8)
EPI CELLS #/AREA URNS HPF: ABNORMAL /HPF
ERYTHROCYTE [DISTWIDTH] IN BLOOD BY AUTOMATED COUNT: 13.1 % (ref 11.9–14.6)
FLUAV RNA SPEC QL NAA+PROBE: NOT DETECTED
FLUBV RNA SPEC QL NAA+PROBE: NOT DETECTED
GLOBULIN SER CALC-MCNC: 3.1 G/DL (ref 2.8–4.5)
GLUCOSE SERPL-MCNC: 91 MG/DL (ref 65–100)
GLUCOSE UR STRIP.AUTO-MCNC: NEGATIVE MG/DL
HCT VFR BLD AUTO: 44.1 % (ref 35.8–46.3)
HGB BLD-MCNC: 14.2 G/DL (ref 11.7–15.4)
HGB UR QL STRIP: NEGATIVE
IMM GRANULOCYTES # BLD AUTO: 0 K/UL (ref 0–0.5)
IMM GRANULOCYTES NFR BLD AUTO: 1 % (ref 0–5)
KETONES UR QL STRIP.AUTO: ABNORMAL MG/DL
LEUKOCYTE ESTERASE UR QL STRIP.AUTO: ABNORMAL
LYMPHOCYTES # BLD: 1.6 K/UL (ref 0.5–4.6)
LYMPHOCYTES NFR BLD: 26 % (ref 13–44)
MCH RBC QN AUTO: 27.5 PG (ref 26.1–32.9)
MCHC RBC AUTO-ENTMCNC: 32.2 G/DL (ref 31.4–35)
MCV RBC AUTO: 85.3 FL (ref 82–102)
MONOCYTES # BLD: 0.6 K/UL (ref 0.1–1.3)
MONOCYTES NFR BLD: 10 % (ref 4–12)
NEUTS SEG # BLD: 3.9 K/UL (ref 1.7–8.2)
NEUTS SEG NFR BLD: 62 % (ref 43–78)
NITRITE UR QL STRIP.AUTO: NEGATIVE
NRBC # BLD: 0 K/UL (ref 0–0.2)
PH UR STRIP: 5.5 (ref 5–9)
PLATELET # BLD AUTO: 259 K/UL (ref 150–450)
PMV BLD AUTO: 10.6 FL (ref 9.4–12.3)
POTASSIUM SERPL-SCNC: 4 MMOL/L (ref 3.5–5.1)
PROT SERPL-MCNC: 7 G/DL (ref 6.3–8.2)
PROT UR STRIP-MCNC: NEGATIVE MG/DL
RBC # BLD AUTO: 5.17 M/UL (ref 4.05–5.2)
RBC #/AREA URNS HPF: ABNORMAL /HPF
SARS-COV-2 RDRP RESP QL NAA+PROBE: NOT DETECTED
SODIUM SERPL-SCNC: 140 MMOL/L (ref 136–146)
SOURCE: NORMAL
SP GR UR REFRACTOMETRY: 1.01 (ref 1–1.02)
TROPONIN I SERPL HS-MCNC: 6.6 PG/ML (ref 0–14)
UROBILINOGEN UR QL STRIP.AUTO: 1 EU/DL (ref 0.2–1)
WBC # BLD AUTO: 6.3 K/UL (ref 4.3–11.1)
WBC URNS QL MICRO: ABNORMAL /HPF

## 2024-02-13 PROCEDURE — 71045 X-RAY EXAM CHEST 1 VIEW: CPT

## 2024-02-13 PROCEDURE — 85025 COMPLETE CBC W/AUTO DIFF WBC: CPT

## 2024-02-13 PROCEDURE — 81001 URINALYSIS AUTO W/SCOPE: CPT

## 2024-02-13 PROCEDURE — 87635 SARS-COV-2 COVID-19 AMP PRB: CPT

## 2024-02-13 PROCEDURE — 87502 INFLUENZA DNA AMP PROBE: CPT

## 2024-02-13 PROCEDURE — 96374 THER/PROPH/DIAG INJ IV PUSH: CPT

## 2024-02-13 PROCEDURE — 6360000002 HC RX W HCPCS: Performed by: EMERGENCY MEDICINE

## 2024-02-13 PROCEDURE — 6370000000 HC RX 637 (ALT 250 FOR IP): Performed by: EMERGENCY MEDICINE

## 2024-02-13 PROCEDURE — 2580000003 HC RX 258: Performed by: EMERGENCY MEDICINE

## 2024-02-13 PROCEDURE — 87086 URINE CULTURE/COLONY COUNT: CPT

## 2024-02-13 PROCEDURE — 80053 COMPREHEN METABOLIC PANEL: CPT

## 2024-02-13 PROCEDURE — 51701 INSERT BLADDER CATHETER: CPT

## 2024-02-13 PROCEDURE — 99285 EMERGENCY DEPT VISIT HI MDM: CPT

## 2024-02-13 PROCEDURE — 84484 ASSAY OF TROPONIN QUANT: CPT

## 2024-02-13 PROCEDURE — 96375 TX/PRO/DX INJ NEW DRUG ADDON: CPT

## 2024-02-13 RX ORDER — MAGNESIUM HYDROXIDE/ALUMINUM HYDROXICE/SIMETHICONE 120; 1200; 1200 MG/30ML; MG/30ML; MG/30ML
30 SUSPENSION ORAL
Status: COMPLETED | OUTPATIENT
Start: 2024-02-13 | End: 2024-02-13

## 2024-02-13 RX ORDER — TRAMADOL HYDROCHLORIDE 50 MG/1
50 TABLET ORAL EVERY 8 HOURS PRN
Qty: 30 TABLET | Refills: 0 | Status: SHIPPED | OUTPATIENT
Start: 2024-02-13 | End: 2024-02-27

## 2024-02-13 RX ORDER — KETOROLAC TROMETHAMINE 15 MG/ML
15 INJECTION, SOLUTION INTRAMUSCULAR; INTRAVENOUS
Status: COMPLETED | OUTPATIENT
Start: 2024-02-13 | End: 2024-02-13

## 2024-02-13 RX ORDER — ONDANSETRON 2 MG/ML
4 INJECTION INTRAMUSCULAR; INTRAVENOUS
Status: COMPLETED | OUTPATIENT
Start: 2024-02-13 | End: 2024-02-13

## 2024-02-13 RX ORDER — ONDANSETRON 8 MG/1
8 TABLET, ORALLY DISINTEGRATING ORAL EVERY 8 HOURS PRN
Qty: 12 TABLET | Refills: 1 | Status: SHIPPED | OUTPATIENT
Start: 2024-02-13

## 2024-02-13 RX ORDER — 0.9 % SODIUM CHLORIDE 0.9 %
1000 INTRAVENOUS SOLUTION INTRAVENOUS ONCE
Status: COMPLETED | OUTPATIENT
Start: 2024-02-13 | End: 2024-02-13

## 2024-02-13 RX ADMIN — SODIUM CHLORIDE 1000 ML: 9 INJECTION, SOLUTION INTRAVENOUS at 22:38

## 2024-02-13 RX ADMIN — ONDANSETRON 4 MG: 2 INJECTION INTRAMUSCULAR; INTRAVENOUS at 22:34

## 2024-02-13 RX ADMIN — ALUMINUM HYDROXIDE, MAGNESIUM HYDROXIDE, SIMETHICONE 30 ML: 200; 200; 20 LIQUID ORAL at 22:35

## 2024-02-13 RX ADMIN — KETOROLAC TROMETHAMINE 15 MG: 15 INJECTION, SOLUTION INTRAMUSCULAR; INTRAVENOUS at 22:34

## 2024-02-13 RX ADMIN — HYOSCYAMINE SULFATE 250 MCG: 0.12 TABLET ORAL; SUBLINGUAL at 22:35

## 2024-02-13 NOTE — PROGRESS NOTES
Mailed patient the orders for her helmet and hospital bed again today along with a list of DME companies. Called patient to let her know to expect them in a few days.

## 2024-02-14 VITALS
OXYGEN SATURATION: 97 % | DIASTOLIC BLOOD PRESSURE: 64 MMHG | WEIGHT: 120 LBS | HEIGHT: 66 IN | TEMPERATURE: 97.5 F | SYSTOLIC BLOOD PRESSURE: 129 MMHG | RESPIRATION RATE: 16 BRPM | BODY MASS INDEX: 19.29 KG/M2 | HEART RATE: 78 BPM

## 2024-02-14 LAB
EKG ATRIAL RATE: 78 BPM
EKG DIAGNOSIS: NORMAL
EKG P AXIS: -21 DEGREES
EKG P-R INTERVAL: 166 MS
EKG QRS DURATION: 90 MS
EKG QTC CALCULATION (BAZETT): 488 MS
EKG R AXIS: 29 DEGREES
EKG T AXIS: -13 DEGREES
EKG VENTRICULAR RATE: 78 BPM

## 2024-02-14 NOTE — ED NOTES
I have reviewed discharge instructions with the patient.  The patient verbalized understanding.    Patient left ED via Discharge Method: stretcher to Home with [facility].    Opportunity for questions and clarification provided.       Patient given 0 scripts.         To continue your aftercare when you leave the hospital, you may receive an automated call from our care team to check in on how you are doing.  This is a free service and part of our promise to provide the best care and service to meet your aftercare needs.” If you have questions, or wish to unsubscribe from this service please call [phone].  Thank you for Choosing our [facility] Emergency Department.        Jayesh Quiroz, RN  02/14/24 4440

## 2024-02-14 NOTE — ED NOTES
Spoke with daughter at this time to arrange discharge home.     Jayesh Quiroz, RN  02/14/24 0141

## 2024-02-14 NOTE — ED NOTES
Attempted to call patients daughter x3 at this time with no answer.     Jayesh Quiroz, RN  02/13/24 [id number]

## 2024-02-14 NOTE — ED PROVIDER NOTES
Emergency Department Provider Note       PCP: Gabby Palafox MD   Age: 73 y.o.   Sex: female     DISPOSITION       No diagnosis found.    Medical Decision Making     Complexity of Problems Addressed:  1 or more acute illnesses that pose a threat to life or bodily function.     Data Reviewed and Analyzed:   I independently ordered and reviewed each unique test.  I reviewed external records: ED visit note from an outside group.  I reviewed external records: provider visit note from PCP.  I reviewed external records: provider visit note from outside specialist.  I reviewed external records: previous EKG including cardiologist interpretation.    I reviewed external records: previous imaging study including radiologist interpretation.     I independently ordered and interpreted the ED EKG in the absence of a Cardiologist.    Rate: 78  EKG Interpretation: EKG Interpretation: sinus rhythm, no evidence of arrhythmia, no acute changes, and non-specific EKG  ST Segments: Nonspecific ST segments - NO STEMI      I independently interpreted the cardiac monitor rhythm strip sinus rhythm without ectopy.  I interpreted the X-rays chest x-ray negative for acute infiltrates or effusions.    Discussion of management or test interpretation.  73-year-old lady with viral syndrome she had a sounds like episode of orthostatic syncope today.  Feeling better after fluids, lab workup to include COVID flu chest x-ray troponin EKG looks good.       Risk of Complications and/or Morbidity of Patient Management:  Over the counter drug management performed.  Patient was discharged risks and benefits of hospitalization were considered.  Shared medical decision making was utilized in creating the patients health plan today.    Is this patient to be included in the SEP-1 core measure due to severe sepsis or septic shock? No Exclusion criteria - the patient is NOT to be included for SEP-1 Core Measure due to: 2+ SIRS criteria are not met

## 2024-02-14 NOTE — ED TRIAGE NOTES
Comes from home via [facility], syncopal episodes today, EMS initially called out for shob. Pt c/o generalized pain and dry throat. hx parkinson's and dysphagia.

## 2024-02-16 LAB
BACTERIA SPEC CULT: NORMAL
SERVICE CMNT-IMP: NORMAL

## 2024-03-15 ENCOUNTER — CLINICAL DOCUMENTATION (OUTPATIENT)
Dept: NEUROLOGY | Age: 74
End: 2024-03-15

## 2024-03-20 ENCOUNTER — TELEPHONE (OUTPATIENT)
Dept: NEUROLOGY | Age: 74
End: 2024-03-20

## 2024-03-20 NOTE — TELEPHONE ENCOUNTER
Patient called and wanted to let you know that she fell and broke her hip.  She had surgery at [facility] and was discharged on 3/5/24.  She is very anxious and is afraid of falling again. She would like to talk to you.

## 2024-03-27 DIAGNOSIS — F41.9 ANXIETY: ICD-10-CM

## 2024-03-27 NOTE — TELEPHONE ENCOUNTER
Patient called our office and is requesting a refill for her xanax. She also wanted to confirm directions for CD-LD CR. I let her know that per Dr. Yan, she is to take medication nightly.

## 2024-03-28 RX ORDER — ALPRAZOLAM 0.5 MG/1
TABLET ORAL
Qty: 90 TABLET | Refills: 2 | Status: SHIPPED | OUTPATIENT
Start: 2024-03-28 | End: 2024-08-24

## 2024-04-09 DIAGNOSIS — G90.3 NEUROGENIC ORTHOSTATIC HYPOTENSION (HCC): ICD-10-CM

## 2024-04-10 RX ORDER — DROXIDOPA 100 MG/1
100 CAPSULE ORAL 3 TIMES DAILY
Qty: 270 CAPSULE | Refills: 3 | Status: SHIPPED | OUTPATIENT
Start: 2024-04-10

## 2024-04-16 ENCOUNTER — TELEPHONE (OUTPATIENT)
Dept: NEUROLOGY | Age: 74
End: 2024-04-16

## 2024-04-16 ENCOUNTER — OFFICE VISIT (OUTPATIENT)
Dept: NEUROLOGY | Age: 74
End: 2024-04-16
Payer: MEDICARE

## 2024-04-16 VITALS — BODY MASS INDEX: 19.37 KG/M2 | DIASTOLIC BLOOD PRESSURE: 84 MMHG | WEIGHT: 120 LBS | SYSTOLIC BLOOD PRESSURE: 132 MMHG

## 2024-04-16 DIAGNOSIS — N39.41 URGE URINARY INCONTINENCE: ICD-10-CM

## 2024-04-16 DIAGNOSIS — R52 PAIN: ICD-10-CM

## 2024-04-16 DIAGNOSIS — R26.9 GAIT DISTURBANCE: ICD-10-CM

## 2024-04-16 DIAGNOSIS — G20.A1 DEMENTIA DUE TO PARKINSON'S DISEASE WITHOUT BEHAVIORAL DISTURBANCE (HCC): ICD-10-CM

## 2024-04-16 DIAGNOSIS — F41.9 ANXIETY: ICD-10-CM

## 2024-04-16 DIAGNOSIS — F02.80 DEMENTIA DUE TO PARKINSON'S DISEASE WITHOUT BEHAVIORAL DISTURBANCE (HCC): ICD-10-CM

## 2024-04-16 DIAGNOSIS — G20.B2 PARKINSON'S DISEASE WITH DYSKINESIA AND FLUCTUATING MANIFESTATIONS (HCC): Primary | ICD-10-CM

## 2024-04-16 DIAGNOSIS — G90.3 NEUROGENIC ORTHOSTATIC HYPOTENSION (HCC): ICD-10-CM

## 2024-04-16 PROCEDURE — 1123F ACP DISCUSS/DSCN MKR DOCD: CPT | Performed by: PSYCHIATRY & NEUROLOGY

## 2024-04-16 PROCEDURE — 3075F SYST BP GE 130 - 139MM HG: CPT | Performed by: PSYCHIATRY & NEUROLOGY

## 2024-04-16 PROCEDURE — 99215 OFFICE O/P EST HI 40 MIN: CPT | Performed by: PSYCHIATRY & NEUROLOGY

## 2024-04-16 PROCEDURE — 3079F DIAST BP 80-89 MM HG: CPT | Performed by: PSYCHIATRY & NEUROLOGY

## 2024-04-16 RX ORDER — TRAMADOL HYDROCHLORIDE 50 MG/1
50 TABLET ORAL EVERY 8 HOURS PRN
Qty: 30 TABLET | Refills: 0 | Status: SHIPPED | OUTPATIENT
Start: 2024-04-16 | End: 2024-04-30

## 2024-04-16 ASSESSMENT — ENCOUNTER SYMPTOMS
COUGH: 0
ABDOMINAL PAIN: 1

## 2024-04-16 NOTE — PATIENT INSTRUCTIONS
Continue your Sinemet  mg tablets. Continue to take 1 tablet every 3 hours on schedule.    You can take an extra dose in the middle the night if you wake up and noticed that you are shaky.    I will have you stop your extended release Sinemet CR tablets at night.    Continue to take droxidopa 100 mg tablets 1 tablet 3 times a day approximately around mealtimes.  If you notice that your blood pressure is elevated around that time, then you can hold the dose until your next one at the next mealtime.    You can use amlodipine 2.5 mg tablets as needed for times when your blood pressure is high.  I would first recommend that you relax for 20 to 30 minutes and recheck it before you take the medication.    Try CBD oil for the pain in your legs.  There are many drugstores in the area where you can get this.  You do not need a prescription.

## 2024-04-16 NOTE — PROGRESS NOTES
[facility] NEUROLOGY  [address], [address]  [place] [address]  Phone: [phone] Fax [phone]  Fernando Yan MD      Patient: Sybil Rosado  Provider: Fernando Yan MD    CC:   Chief Complaint   Patient presents with    Follow-up     Parkinson's disease     Referring Provider:    History of Present Illness:     Sybil Rosado is a 73 y.o. RH female who presents for follow up of Parkinson's disease and chemodenervation for focal dystonia in the lower extremities.    She is accompanied by her daughter.  She was last seen January 2024.     She presents for follow-up and continued management of Parkinson's disease diagnosed approximately 2014. Records suggest tremors of the hands at onset with later development of generalized slowness, stiffness, and gait changes.  She was previously followed by [facility].     Of note, Ms. Rosado has been a challenging patient to manage, complicated by inconsistency with respect to medications and she is often not a very good historian when trying to describe what medications she is taking and if they are effective or not.  She has been on and off several medications in past both for Parkinson's disease as well as for persistent neuropathic pain of her lower extremities.     She reports taking the following:  Sinemet  mg 0.5-1 tablets every 3-4 hours  Sinemet CR  mg 1 tablet at night  Neupro 2 mg / 24 hr patch daily  Droxidopa 100 mg tablets 1 tablet 3 times a day (holds a blood pressure is too high)   Xanax 0.5 mg 2 times a day as needed     Previous medication trials include: Amantadine, Gocori, Nourianz, Sinemet CR    Patient presents today for follow-up.    She continues to describe motor fluctuations throughout the day which can involve either patterns of \"shaking\" throughout the day in addition to a gait disturbance highlighted by shuffling and freezing of gait. No obvious descriptions of dyskinesias at this time but these

## 2024-05-02 ENCOUNTER — TELEPHONE (OUTPATIENT)
Dept: NEUROLOGY | Age: 74
End: 2024-05-02

## 2024-05-02 DIAGNOSIS — K11.7 SIALORRHEA: Primary | ICD-10-CM

## 2024-05-03 RX ORDER — ATROPINE SULFATE 10 MG/ML
1 SOLUTION/ DROPS OPHTHALMIC
Qty: 15 ML | Refills: 2 | Status: SHIPPED | OUTPATIENT
Start: 2024-05-03

## 2024-05-03 NOTE — TELEPHONE ENCOUNTER
Spoke to patient.  Advise she trial 5 mg of melatonin at night.  Encouraged her to take her Xanax.  I suspect some of these insomnia issues to be related to grieving as she recently lost a good friend.    She is complaining of increased secretions over the last month and we can try atropine drops sublingually.  Prescription was sent.

## 2024-05-20 ENCOUNTER — TELEPHONE (OUTPATIENT)
Dept: NEUROLOGY | Age: 74
End: 2024-05-20

## 2024-05-20 NOTE — TELEPHONE ENCOUNTER
Patient says the Neupro patch is causing the pain in her legs and feet to be worse. She is also \"twitching\" and its burning where she places the patch. She wants to take something else for the tremors.

## 2024-05-22 NOTE — TELEPHONE ENCOUNTER
FYI:   Called.  No answer.  Left voice message.    Recommended she discontinue the Neupro patch if she has not already.  I have not recommended any other additional add-on therapy at this time.  I would prefer her to continue her Sinemet as prescribed and stay on schedule with her dosage timing.

## 2024-05-31 ENCOUNTER — TELEPHONE (OUTPATIENT)
Dept: NEUROLOGY | Age: 74
End: 2024-05-31

## 2024-05-31 NOTE — TELEPHONE ENCOUNTER
Patient was talking to the [facility] and they told her to ask if you would prescribe Ogentys for her.

## 2024-06-03 NOTE — TELEPHONE ENCOUNTER
Spoke to patient.  She will come by for samples of Ongentys.  I discussed with her appropriate use.      She will take 1 tablet at night.  I also instructed her to reduce her Sinemet dose to only half a tablet with each dose in an attempt to avoid problematic dyskinesias once she starts Ongentys.

## 2024-06-10 DIAGNOSIS — G20.B2 PARKINSON'S DISEASE WITH DYSKINESIA AND FLUCTUATING MANIFESTATIONS (HCC): Primary | ICD-10-CM

## 2024-07-29 ENCOUNTER — TELEPHONE (OUTPATIENT)
Dept: NEUROLOGY | Age: 74
End: 2024-07-29

## 2024-07-29 NOTE — TELEPHONE ENCOUNTER
Pt called asked when was her next appt and I told her that it was in Oct. Pt stated that she is having so many aches and pains and she can't hardly walk. She stated that she needed to see you before her October appt. Pt stated that she fell in February and broke her hip as well.

## 2024-07-30 NOTE — TELEPHONE ENCOUNTER
Noted.  These problems are very chronic.  The hip fracture was noted at his last visit.  Will await something to open via cancellation we can try to get her in sooner.

## 2024-08-15 ENCOUNTER — TELEPHONE (OUTPATIENT)
Dept: NEUROLOGY | Age: 74
End: 2024-08-15

## 2024-08-15 DIAGNOSIS — G20.B2 PARKINSON'S DISEASE WITH DYSKINESIA AND FLUCTUATING MANIFESTATIONS (HCC): ICD-10-CM

## 2024-08-15 RX ORDER — CARBIDOPA AND LEVODOPA 50; 200 MG/1; MG/1
1 TABLET, EXTENDED RELEASE ORAL NIGHTLY
Qty: 30 TABLET | Refills: 5 | Status: SHIPPED | OUTPATIENT
Start: 2024-08-15

## 2024-08-15 NOTE — TELEPHONE ENCOUNTER
Spoke to patient.  She notes a poor response to her Sinemet recently with more OFF time.  This may have been triggered by another change in  which appears to be a very common problem that we are encountering at various pharmacies in the [place] area.    We are going to try to make up for the worsening OFF time by increasing her Sinemet to 1.5 tablets with each dose, taken approximately every 3 hours.  She is no longer taking the Neupro patch due to adverse effects.  We are going to restart the extended release Sinemet CR at night because she ran out of her current prescription.

## 2024-08-15 NOTE — TELEPHONE ENCOUNTER
Patients legs feel like they are paralyzed, she can't move them and they hurt. The medication is also making her sick to her stomach. The sinemet is not working anymore.

## 2024-09-20 DIAGNOSIS — G20.B2 PARKINSON'S DISEASE WITH DYSKINESIA AND FLUCTUATING MANIFESTATIONS (HCC): ICD-10-CM

## 2024-09-20 RX ORDER — CARBIDOPA AND LEVODOPA 25; 100 MG/1; MG/1
TABLET ORAL
Qty: 210 TABLET | Refills: 11 | Status: CANCELLED | OUTPATIENT
Start: 2024-09-20

## 2024-10-25 ENCOUNTER — OFFICE VISIT (OUTPATIENT)
Dept: NEUROLOGY | Age: 74
End: 2024-10-25
Payer: MEDICARE

## 2024-10-25 VITALS
BODY MASS INDEX: 19.37 KG/M2 | HEART RATE: 67 BPM | HEIGHT: 66 IN | SYSTOLIC BLOOD PRESSURE: 115 MMHG | DIASTOLIC BLOOD PRESSURE: 62 MMHG

## 2024-10-25 DIAGNOSIS — F41.9 ANXIETY: ICD-10-CM

## 2024-10-25 DIAGNOSIS — G90.3 NEUROGENIC ORTHOSTATIC HYPOTENSION (HCC): ICD-10-CM

## 2024-10-25 DIAGNOSIS — G20.A1 DEMENTIA DUE TO PARKINSON'S DISEASE WITHOUT BEHAVIORAL DISTURBANCE (HCC): ICD-10-CM

## 2024-10-25 DIAGNOSIS — F02.80 DEMENTIA DUE TO PARKINSON'S DISEASE WITHOUT BEHAVIORAL DISTURBANCE (HCC): ICD-10-CM

## 2024-10-25 DIAGNOSIS — G20.B2 PARKINSON'S DISEASE WITH DYSKINESIA AND FLUCTUATING MANIFESTATIONS (HCC): Primary | ICD-10-CM

## 2024-10-25 DIAGNOSIS — R26.9 GAIT DISTURBANCE: ICD-10-CM

## 2024-10-25 PROCEDURE — 99214 OFFICE O/P EST MOD 30 MIN: CPT | Performed by: PSYCHIATRY & NEUROLOGY

## 2024-10-25 PROCEDURE — 3074F SYST BP LT 130 MM HG: CPT | Performed by: PSYCHIATRY & NEUROLOGY

## 2024-10-25 PROCEDURE — 1123F ACP DISCUSS/DSCN MKR DOCD: CPT | Performed by: PSYCHIATRY & NEUROLOGY

## 2024-10-25 PROCEDURE — 3078F DIAST BP <80 MM HG: CPT | Performed by: PSYCHIATRY & NEUROLOGY

## 2024-10-25 RX ORDER — ALPRAZOLAM 0.5 MG
0.5 TABLET ORAL 3 TIMES DAILY PRN
COMMUNITY

## 2024-10-25 ASSESSMENT — ENCOUNTER SYMPTOMS
ABDOMINAL PAIN: 1
COUGH: 0

## 2024-10-25 NOTE — PROGRESS NOTES
orthostatic hypotension (HCC)    Anxiety        Ms. Rosado presents for follow-up and continued management of her Parkinson's disease which is complicated by several issues including gait and balance impairment, levodopa-induced dyskinesias, motor fluctuations related to use of levodopa, cognitive impairment likely consistent with Parkinson's disease dementia, and focal dystonia of the lower extremities.    Parkinson's disease:   Continue Sinemet  mg 1 tablet every 3 hours, advised that she may take an extra tablet as needed for worsening OFF symptoms and can monitor how often she is requiring it so dosage adjustments can be made.     Gait disturbance:  Encouraged continued use of assistive devices for fall prevention.    Parkinson's disease dementia:  Have held on any further medications due to relative intolerance to medications in general.  Continue to monitor for any worsening psychotic symptoms.    Neurogenic orthostatic hypotension:  Continue droxidopa 100 mg up to 3 times a day as needed for low systolic blood pressures.  She also follows up with cardiology.     Anxiety:  She may continue Xanax 0.5 mg 3 times daily as needed for anxiety.      Follow up in 3 months.       Signature: Fernando Yan MD      Date:  10/26/2024    [address], [address] [place] [address]  Ph: [phone]  Fax: [phone]         I have personally interviewed and examined Ms. Sybil Rosado and I have personally reviewed all relevant records including labs and imaging as noted above. I have written all aspects of this note. More than 50% of this time was used for counseling regarding my diagnosis, prognosis, and plans for management.   Total visit time: 33 minutes.

## 2025-01-15 ENCOUNTER — TELEPHONE (OUTPATIENT)
Dept: NEUROLOGY | Age: 75
End: 2025-01-15

## 2025-01-15 NOTE — TELEPHONE ENCOUNTER
Patient called requesting a sooner appt bc her feet and legs hurt real bad and she is shaking a lot. Please advise.

## 2025-01-17 NOTE — TELEPHONE ENCOUNTER
Spoke with patient.  Her current description of her movements appears to be more consistent with dyskinesia, although this can be very difficult to know in her case as she is not a very good historian.    She is taking Sinemet essentially 1 tablet every 3 hours starting at 5 AM and she does note that worsening movements, again likely dyskinesias, or worsening in the evening.  So I will have her reduce her Sinemet dose starting at 2 PM to only 1/2 tablets.

## 2025-02-22 NOTE — PROGRESS NOTES
ECHO 10/7/24: nl LV sys fx EF 60% . Mild mitral valve stenosis. Mild to moderate tricuspid regurgitation. mod AS   a/p    89 yo F with PMH CHF, valvular disease, anemia, pHTN, CAD, CVA (no deficits), T2D, [place], COPD (2L NC PRN, qhs), PVD, HTN, HLD, and ?lung CA (pending RT) p/w dysuria x 1wk along with tremors and SOB + pna    #PNA  -Meeting sepsis criteria w/ fever, tachycardia, leukocytosis  -CTa chest with no PE, Interval increase in groundglass opacities and subpleural consolidations  in bilateral lungs, interlobularseptal thickening in  the upper lobes, which can be suggestive of interstitial edema; 3.1 cm mass like consolidation in left lower lobe, Mediastinal and hilar lymphadenopathy which can be reactive or due to  metastasis.  Subpleural reticulations, Honeycombing, and peripheral cystic changes in  bilateral lungs, suggestive of interstitial lung fibrosis.  -abx per id/med  -cont iv lasix     #hypotension   -resolved  -micu eval noted- hypotensive to 80s/40s, given 2x 500cc bolus and Midodrine 10mg w/ improvement in BP to MAP >70  -Pocus w/ collapsed IVC suggestive of intravascular depletion, but w/ evidence of reduced RV and LV function   -f/u ECHO   -sepsis work up   -not a candidate for MICU per team   -HS trop neg. no ischemic changes to ecg     #CAD s/p PCI  -stable  -cont asa, statin, BB    #HFpEF  -repeat ECHO as above  -iv lasix     #Pulmonary HTN.   -c/w home sildenafil 20mg TID.  -fu echo     #Lung CA  -med/ pulm fu   -pending RT initiation as outpt     #UTI   -management per med /ID    dvt ppx      35 minutes spent on total encounter; more than 50% of the visit was spent counseling and/or coordinating care by the attending physician.   Report called to [facility].  [phone] to room 102A

## 2025-03-03 DIAGNOSIS — G20.B2 PARKINSON'S DISEASE WITH DYSKINESIA AND FLUCTUATING MANIFESTATIONS (HCC): ICD-10-CM

## 2025-03-03 RX ORDER — CARBIDOPA AND LEVODOPA 25; 100 MG/1; MG/1
TABLET ORAL
Qty: 630 TABLET | Refills: 3 | Status: SHIPPED | OUTPATIENT
Start: 2025-03-03

## 2025-04-01 ENCOUNTER — TELEPHONE (OUTPATIENT)
Dept: NEUROLOGY | Age: 75
End: 2025-04-01

## 2025-04-01 NOTE — TELEPHONE ENCOUNTER
Patient called stating that her legs are giving out and she has had frequent falls. She recently had eye surgery and she fell and hit her head this morning. I advised her to go to the ER to be evaluated.

## 2025-04-16 ENCOUNTER — OFFICE VISIT (OUTPATIENT)
Dept: NEUROLOGY | Age: 75
End: 2025-04-16
Payer: MEDICARE

## 2025-04-16 VITALS
BODY MASS INDEX: 16.95 KG/M2 | DIASTOLIC BLOOD PRESSURE: 82 MMHG | WEIGHT: 105 LBS | SYSTOLIC BLOOD PRESSURE: 154 MMHG | OXYGEN SATURATION: 97 % | HEART RATE: 72 BPM

## 2025-04-16 DIAGNOSIS — F41.9 ANXIETY: ICD-10-CM

## 2025-04-16 DIAGNOSIS — G20.A1 DEMENTIA DUE TO PARKINSON'S DISEASE WITHOUT BEHAVIORAL DISTURBANCE (HCC): ICD-10-CM

## 2025-04-16 DIAGNOSIS — R47.89 OTHER SPEECH DISTURBANCE: ICD-10-CM

## 2025-04-16 DIAGNOSIS — F02.80 DEMENTIA DUE TO PARKINSON'S DISEASE WITHOUT BEHAVIORAL DISTURBANCE (HCC): ICD-10-CM

## 2025-04-16 DIAGNOSIS — R26.9 GAIT DISTURBANCE: ICD-10-CM

## 2025-04-16 DIAGNOSIS — G90.3 NEUROGENIC ORTHOSTATIC HYPOTENSION (HCC): ICD-10-CM

## 2025-04-16 DIAGNOSIS — G20.B2 PARKINSON'S DISEASE WITH DYSKINESIA AND FLUCTUATING MANIFESTATIONS (HCC): Primary | ICD-10-CM

## 2025-04-16 PROCEDURE — 3079F DIAST BP 80-89 MM HG: CPT | Performed by: PSYCHIATRY & NEUROLOGY

## 2025-04-16 PROCEDURE — 3077F SYST BP >= 140 MM HG: CPT | Performed by: PSYCHIATRY & NEUROLOGY

## 2025-04-16 PROCEDURE — 99215 OFFICE O/P EST HI 40 MIN: CPT | Performed by: PSYCHIATRY & NEUROLOGY

## 2025-04-16 PROCEDURE — 1123F ACP DISCUSS/DSCN MKR DOCD: CPT | Performed by: PSYCHIATRY & NEUROLOGY

## 2025-04-16 ASSESSMENT — ENCOUNTER SYMPTOMS
COUGH: 0
ABDOMINAL PAIN: 1

## 2025-04-16 NOTE — PROGRESS NOTES
[facility] NEUROLOGY  [address], [address]  [place] [address]  Phone: [phone] Fax [phone]  Fernando Yan MD      Patient: Sybil Rosado  Provider: Fernando Yan MD    CC:   Chief Complaint   Patient presents with    Follow-up     Parkinson's  Fell twice  Numb legs     Referring Provider:    History of Present Illness:     Sybil Rosado is a 74 y.o. RH female who presents for follow up of Parkinson's disease and chemodenervation for focal dystonia in the lower extremities.    She is accompanied by her daughter.  She was last seen October 2024.     She presents for follow-up and continued management of Parkinson's disease diagnosed approximately 2014. Records suggest tremors of the hands at onset with later development of generalized slowness, stiffness, and gait changes.  She was previously followed by [facility].     Of note, Ms. Rosado has been a challenging patient to manage, complicated by inconsistency with respect to medications and she is often not a very good historian when trying to describe what medications she is taking and if they are effective or not.  She has been on and off several medications in past both for Parkinson's disease as well as for persistent neuropathic pain of her lower extremities.     She reports taking the following:  Sinemet  mg 1 tablets every 3 hours  Droxidopa 100 mg tablets 1 tablet 3 times a day (holds a blood pressure is too high)   Xanax 0.5 mg 2 times a day as needed     Previous medication trials include: Amantadine, Gocori, Nourianz, Sinemet CR    Patient presents today for follow-up.    Chart review reveals no significant changes since the last visit.    However she has had a couple of falls.  Most of these are attributed to balance impairment.  She ambulates with the assistance of a rollator walker but despite its use as noted intermittent falls.    Current Sinemet dose of 1 tablet every 3 hours but has, at times,

## 2025-04-16 NOTE — PATIENT INSTRUCTIONS
Start Ongentys 50 mg tablets.  Take 1 tablet at bedtime.  This helps \"boost\" your levodopa and make it last longer.    To make room for the extra medication, I would like you to reduce your carbidopa levodopa to 1 tablet every 4 hours.     Watch out for any side effects, especially dyskinesias - abnormal \"wiggly\" movements of the head, legs, or trunk.    Continue to use your droxidopa as needed for times when your blood pressure is low.  Avoid taking it after 4 PM.    You may continue to use your Xanax as needed for any breakthrough anxiety.

## 2025-05-01 DIAGNOSIS — G90.3 NEUROGENIC ORTHOSTATIC HYPOTENSION (HCC): ICD-10-CM

## 2025-05-01 RX ORDER — DROXIDOPA 100 MG/1
100 CAPSULE ORAL 3 TIMES DAILY
Qty: 90 CAPSULE | Refills: 5 | Status: ACTIVE | OUTPATIENT
Start: 2025-05-01

## 2025-06-30 ENCOUNTER — TELEPHONE (OUTPATIENT)
Dept: NEUROLOGY | Age: 75
End: 2025-06-30

## 2025-06-30 DIAGNOSIS — G20.B2 PARKINSON'S DISEASE WITH DYSKINESIA AND FLUCTUATING MANIFESTATIONS (HCC): Primary | ICD-10-CM

## 2025-06-30 DIAGNOSIS — R26.9 GAIT DISTURBANCE: ICD-10-CM

## 2025-06-30 NOTE — TELEPHONE ENCOUNTER
Pt lvm stating that she is needing a new order to get a new wheelchair and walker. Pt stated that the screws are coming out if the walker. Pt was seen last on April 16,2025.

## 2025-07-24 ENCOUNTER — TELEPHONE (OUTPATIENT)
Dept: NEUROLOGY | Age: 75
End: 2025-07-24

## 2025-08-07 ENCOUNTER — TELEPHONE (OUTPATIENT)
Dept: NEUROLOGY | Age: 75
End: 2025-08-07

## 2025-08-07 DIAGNOSIS — M62.838 MUSCLE SPASM: ICD-10-CM

## 2025-08-07 DIAGNOSIS — G20.B2 PARKINSON'S DISEASE WITH DYSKINESIA AND FLUCTUATING MANIFESTATIONS (HCC): Primary | ICD-10-CM

## 2025-08-07 RX ORDER — CYCLOBENZAPRINE HCL 10 MG
10 TABLET ORAL 3 TIMES DAILY PRN
Qty: 60 TABLET | Refills: 2 | Status: SHIPPED | OUTPATIENT
Start: 2025-08-07 | End: 2025-12-05

## 2025-08-12 DIAGNOSIS — F41.9 ANXIETY: Primary | ICD-10-CM

## 2025-08-12 RX ORDER — ALPRAZOLAM 0.5 MG
0.5 TABLET ORAL 3 TIMES DAILY PRN
Qty: 90 TABLET | Refills: 5 | Status: SHIPPED | OUTPATIENT
Start: 2025-08-12 | End: 2026-02-08

## 2025-08-19 DIAGNOSIS — K11.7 SIALORRHEA: ICD-10-CM

## 2025-08-20 RX ORDER — ATROPINE SULFATE 10 MG/ML
1 SOLUTION/ DROPS OPHTHALMIC
Qty: 15 ML | Refills: 2 | Status: SHIPPED | OUTPATIENT
Start: 2025-08-20

## 2025-08-27 ENCOUNTER — TELEPHONE (OUTPATIENT)
Dept: NEUROLOGY | Age: 75
End: 2025-08-27

## 2025-08-27 DIAGNOSIS — F41.9 INSOMNIA SECONDARY TO ANXIETY: Primary | ICD-10-CM

## 2025-08-27 DIAGNOSIS — F51.05 INSOMNIA SECONDARY TO ANXIETY: Primary | ICD-10-CM

## 2025-08-27 RX ORDER — MIRTAZAPINE 15 MG/1
15 TABLET, FILM COATED ORAL NIGHTLY
Qty: 30 TABLET | Refills: 5 | Status: SHIPPED | OUTPATIENT
Start: 2025-08-27

## (undated) DEVICE — CONTAINER PREFIL FRMLN 40ML --

## (undated) DEVICE — MEDI-VAC YANKAUER SUCTION HANDLE W/BULBOUS TIP: Brand: CARDINAL HEALTH

## (undated) DEVICE — CATH URET 5FRX70CM W/OPN END -- BX/20

## (undated) DEVICE — CONNECTOR TBNG OD5-7MM O2 END DISP

## (undated) DEVICE — BLOCK BITE AD 60FR W/ VELC STRP ADDRESSES MOST PT AND

## (undated) DEVICE — DRAPE,UNDERBUTTOCKS,PCH,STERILE: Brand: MEDLINE

## (undated) DEVICE — AIRLIFE™ OXYGEN TUBING 7 FEET (2.1 M) CRUSH RESISTANT OXYGEN TUBING, VINYL TIPPED: Brand: AIRLIFE™

## (undated) DEVICE — MOUTHPIECE ENDOSCP 20X27MM --

## (undated) DEVICE — FORCEPS BX L240CM JAW DIA2.8MM L CAP W/ NDL MIC MESH TOOTH

## (undated) DEVICE — 2000CC GUARDIAN II: Brand: GUARDIAN

## (undated) DEVICE — PURSE STRING DEVICE: Brand: PURSTRING

## (undated) DEVICE — SOLUTION IV 1000ML 0.9% SOD CHL

## (undated) DEVICE — CIRCULAR STAPLER WITH DST SERIES TECHNOLOGY: Brand: EEA

## (undated) DEVICE — CYSTO: Brand: MEDLINE INDUSTRIES, INC.

## (undated) DEVICE — SUTURE VCRL SZ 0 L36IN ABSRB UD L36MM CT-1 1/2 CIR J946H

## (undated) DEVICE — RELOAD STPL L75MM OPN STPL H4.5MM CLS STPL H2MM WIRE

## (undated) DEVICE — BLADE ELECTRODE: Brand: EDGE

## (undated) DEVICE — CANNULA NSL ORAL AD FOR CAPNOFLEX CO2 O2 AIRLFE

## (undated) DEVICE — KENDALL RADIOLUCENT FOAM MONITORING ELECTRODE RECTANGULAR SHAPE: Brand: KENDALL

## (undated) DEVICE — REM POLYHESIVE ADULT PATIENT RETURN ELECTRODE: Brand: VALLEYLAB

## (undated) DEVICE — 40585 XL ADVANCED TRENDELENBURG POSITIONING KIT: Brand: 40585 XL ADVANCED TRENDELENBURG POSITIONING KIT

## (undated) DEVICE — SUTURE PDS II SZ 1 L54IN ABSRB VLT L65MM TP-1 1/2 CIR Z879G

## (undated) DEVICE — SCOPE RECT LED W INSUFFLATOR

## (undated) DEVICE — TRAY CATH 16F URIN MTR LTX -- CONVERT TO ITEM 363111

## (undated) DEVICE — WOUND RETRACTOR AND PROTECTOR: Brand: ALEXIS O WOUND PROTECTOR-RETRACTOR

## (undated) DEVICE — GUIDEWIRE ENDOSCP L150CM OD035IN PTFE STR MOVABLE COR

## (undated) DEVICE — SLIM BODY SKIN STAPLER: Brand: APPOSE ULC

## (undated) DEVICE — SYR 5ML 1/5 GRAD LL NSAF LF --

## (undated) DEVICE — BUTTON SWITCH PENCIL BLADE ELECTRODE, HOLSTER: Brand: EDGE

## (undated) DEVICE — SUTURE PERMAHAND SZ 2-0 L18IN NONABSORBABLE BLK L26MM SH C012D

## (undated) DEVICE — SUT SLK 4-0 18IN TIE MP BLK --

## (undated) DEVICE — DRAPE TWL SURG 16X26IN BLU ORB04] ALLCARE INC]

## (undated) DEVICE — GOWN,BREATHABLE SLV,AURORA,LG,STRL: Brand: MEDLINE

## (undated) DEVICE — NDL PRT INJ NSAF BLNT 18GX1.5 --

## (undated) DEVICE — CONTAINER SPEC HISTOLOGY 900ML POLYPR

## (undated) DEVICE — SUTURE PERMAHAND SZ 3-0 L18IN NONABSORBABLE BLK L26MM SH C013D

## (undated) DEVICE — DRSG AQUACEL SURG 3.5 X 10IN -- CONVERT TO ITEM 370183

## (undated) DEVICE — CYSTO/BLADDER IRRIGATION SET, REGULATING CLAMP

## (undated) DEVICE — INTENDED FOR TISSUE SEPARATION, AND OTHER PROCEDURES THAT REQUIRE A SHARP SURGICAL BLADE TO PUNCTURE OR CUT.: Brand: BARD-PARKER ® STAINLESS STEEL BLADES

## (undated) DEVICE — SURGICAL PROCEDURE PACK BASIC ST FRANCIS

## (undated) DEVICE — CURVED, LARGE JAW, OPEN SEALER/DIVIDER NANO-COATED: Brand: LIGASURE IMPACT

## (undated) DEVICE — RELOAD STAPLER REGULAR TISSUE GREEN ECHELON CONTOUR GST --

## (undated) DEVICE — (D)STRIP SKN CLSR 0.5X4IN WHT --

## (undated) DEVICE — SHEET, T, LAPAROTOMY, STERILE: Brand: MEDLINE

## (undated) DEVICE — KENDALL SCD EXPRESS SLEEVES, KNEE LENGTH, MEDIUM: Brand: KENDALL SCD

## (undated) DEVICE — SPONGE LAP 18X18IN STRL -- 5/PK

## (undated) DEVICE — SOLUTION IRRIG 3000ML H2O STRL BAG

## (undated) DEVICE — TRAY PREP DRY W/ PREM GLV 2 APPL 6 SPNG 2 UNDPD 1 OVERWRAP

## (undated) DEVICE — SUTURE PERMAHAND SZ 2-0 L12X18IN NONABSORBABLE BLK SILK A185H

## (undated) DEVICE — STAPLER INT L55MM CUT LN L53MM STPL LN L57MM BLU B FRM 8

## (undated) DEVICE — SYR 3ML LL TIP 1/10ML GRAD --

## (undated) DEVICE — BLADE SURG NO15 S STL STR DISP GLASSVAN

## (undated) DEVICE — BLLN KT O RING ENDOSCP US --

## (undated) DEVICE — SUTURE PERMAHAND SZ 3-0 L18IN NONABSORBABLE BLK SILK BRAID A184H